# Patient Record
Sex: MALE | Race: WHITE | NOT HISPANIC OR LATINO | Employment: OTHER | URBAN - METROPOLITAN AREA
[De-identification: names, ages, dates, MRNs, and addresses within clinical notes are randomized per-mention and may not be internally consistent; named-entity substitution may affect disease eponyms.]

---

## 2018-02-09 ENCOUNTER — OFFICE VISIT (OUTPATIENT)
Dept: FAMILY MEDICINE | Facility: CLINIC | Age: 68
End: 2018-02-09
Payer: COMMERCIAL

## 2018-02-09 ENCOUNTER — TELEPHONE (OUTPATIENT)
Dept: FAMILY MEDICINE | Facility: CLINIC | Age: 68
End: 2018-02-09

## 2018-02-09 VITALS
BODY MASS INDEX: 24.86 KG/M2 | OXYGEN SATURATION: 97 % | SYSTOLIC BLOOD PRESSURE: 128 MMHG | WEIGHT: 164 LBS | HEIGHT: 68 IN | TEMPERATURE: 98.7 F | RESPIRATION RATE: 20 BRPM | HEART RATE: 83 BPM | DIASTOLIC BLOOD PRESSURE: 96 MMHG

## 2018-02-09 DIAGNOSIS — I10 HYPERTENSION GOAL BP (BLOOD PRESSURE) < 140/90: ICD-10-CM

## 2018-02-09 DIAGNOSIS — J20.9 ACUTE BRONCHITIS, UNSPECIFIED ORGANISM: Primary | ICD-10-CM

## 2018-02-09 DIAGNOSIS — F40.298 NEEDLE PHOBIA: ICD-10-CM

## 2018-02-09 DIAGNOSIS — R05.9 COUGH: ICD-10-CM

## 2018-02-09 LAB
FLUAV+FLUBV AG SPEC QL: NEGATIVE
FLUAV+FLUBV AG SPEC QL: NEGATIVE
SPECIMEN SOURCE: NORMAL

## 2018-02-09 PROCEDURE — 99214 OFFICE O/P EST MOD 30 MIN: CPT | Performed by: PHYSICIAN ASSISTANT

## 2018-02-09 PROCEDURE — 87804 INFLUENZA ASSAY W/OPTIC: CPT | Performed by: PHYSICIAN ASSISTANT

## 2018-02-09 RX ORDER — CEFDINIR 300 MG/1
300 CAPSULE ORAL 2 TIMES DAILY
Qty: 20 CAPSULE | Refills: 0 | Status: SHIPPED | OUTPATIENT
Start: 2018-02-09 | End: 2018-02-19

## 2018-02-09 RX ORDER — PREDNISONE 20 MG/1
20 TABLET ORAL 2 TIMES DAILY
Qty: 10 TABLET | Refills: 0 | Status: SHIPPED | OUTPATIENT
Start: 2018-02-09 | End: 2018-02-28

## 2018-02-09 RX ORDER — LIDOCAINE/PRILOCAINE 2.5 %-2.5%
CREAM (GRAM) TOPICAL PRN
Qty: 5 G | Refills: 3 | Status: SHIPPED | OUTPATIENT
Start: 2018-02-09 | End: 2021-05-04

## 2018-02-09 ASSESSMENT — PAIN SCALES - GENERAL: PAINLEVEL: NO PAIN (0)

## 2018-02-09 NOTE — PATIENT INSTRUCTIONS
Apply EMLA cream to affected area 15-30 minutes before needle stick.    Take omnicef twice a day for 10 days  Take prednisone twice a day for 5 days  Please schedule physical with fasting labs (nothing to eat or drink except water and/or medications 10 hours prior to appointment) and follow up  for treatment of hypertension or consider hypertension study for blood pressure management.

## 2018-02-09 NOTE — NURSING NOTE
"Chief Complaint   Patient presents with     Cough       Initial BP (!) 134/94 (BP Location: Right arm, Patient Position: Chair, Cuff Size: Adult Regular)  Pulse 83  Temp 98.7  F (37.1  C) (Oral)  Resp 20  Ht 1.715 m (5' 7.52\")  Wt 74.4 kg (164 lb)  SpO2 97%  BMI 25.29 kg/m2 Estimated body mass index is 25.29 kg/(m^2) as calculated from the following:    Height as of this encounter: 1.715 m (5' 7.52\").    Weight as of this encounter: 74.4 kg (164 lb).  Medication Reconciliation: complete     Crystal Edmonds      "

## 2018-02-09 NOTE — TELEPHONE ENCOUNTER
Obtain PA or change medication.    To obtain a PA for lidocaine-prilocaine (EMLA) cream:  1. Go to key.covermymeds.com and click Enter a Key  2. Enter the following       Key: [id number]       Last Name: Yaquelin       : 1950  3. Finish the form by completing required fields and click Send to Plan.      Member: KALPANA JAIME [[id number]]     Plan: BCBS PLATINUM BLUE [[id number]] Payor: LUANA [3]      Coverage Information    Coverage information:     Subscriber: [id number] KALPANA JAIME     Rel to sub: 01 - Self     Member ID: [id number]     Payor: 3-LUANA Ph: [phone]     Benefit plan: -BCBS PLATINUM BLUE     Group number: [id number]

## 2018-02-09 NOTE — MR AVS SNAPSHOT
"              After Visit Summary   2/9/2018    Josr Jamison    MRN: [id number]           Patient Information     Date Of Birth          1950        Visit Information        Provider Department      2/9/2018 1:20 PM Sandie Velarde PA-C [facility]        Today's Diagnoses     Cough    -  1    Needle phobia        Acute bronchitis, unspecified organism          Care Instructions    Apply EMLA cream to affected area 15-30 minutes before needle stick.    Take omnicef twice a day for 10 days  Take prednisone twice a day for 5 days  Please schedule physical with fasting labs (nothing to eat or drink except water and/or medications 10 hours prior to appointment) and follow up  for treatment of hypertension or consider hypertension study for blood pressure management.                     Follow-ups after your visit        Who to contact     If you have questions or need follow up information about today's clinic visit or your schedule please contact [facility] directly at [phone].  Normal or non-critical lab and imaging results will be communicated to you by MyChart, letter or phone within 4 business days after the clinic has received the results. If you do not hear from us within 7 days, please contact the clinic through [employer] Camp[employer]art or phone. If you have a critical or abnormal lab result, we will notify you by phone as soon as possible.  Submit refill requests through CaseStack or call your pharmacy and they will forward the refill request to us. Please allow 3 business days for your refill to be completed.          Additional Information About Your Visit        MyChart Information     CaseStack lets you send messages to your doctor, view your test results, renew your prescriptions, schedule appointments and more. To sign up, go to [email or web address] . Click on \"Log in\" on the left side of the screen, which will take you to the Welcome page. Then click on \"Sign up Now\" on " "the right side of the page.     You will be asked to enter the access code listed below, as well as some personal information. Please follow the directions to create your username and password.     Your access code is: [email or web address]  Expires: 5/10/2018  2:04 PM     Your access code will  in 90 days. If you need help or a new code, please call your [place] clinic or [phone].        Care EveryWhere ID     This is your Care EveryWhere ID. This could be used by other organizations to access your [place] medical records  [id number]        Your Vitals Were     Pulse Temperature Respirations Height Pulse Oximetry BMI (Body Mass Index)    83 98.7  F (37.1  C) (Oral) 20 1.715 m (5' 7.52\") 97% 25.29 kg/m2       Blood Pressure from Last 3 Encounters:   18 (!) 128/96   16 (!) 155/95   16 144/90    Weight from Last 3 Encounters:   18 74.4 kg (164 lb)   16 80 kg (176 lb 4.8 oz)   16 80 kg (176 lb 4.8 oz)              We Performed the Following     Influenza A/B antigen          Today's Medication Changes          These changes are accurate as of 18  2:04 PM.  If you have any questions, ask your nurse or doctor.               Start taking these medicines.        Dose/Directions    cefdinir 300 MG capsule   Commonly known as:  OMNICEF   Used for:  Acute bronchitis, unspecified organism   Started by:  Sandie Velarde PA-C        Dose:  300 mg   Take 1 capsule (300 mg) by mouth 2 times daily for 10 days   Quantity:  20 capsule   Refills:  0       lidocaine-prilocaine cream   Commonly known as:  EMLA   Used for:  Needle phobia   Started by:  Sandie Velarde PA-C        Apply topically as needed for moderate pain   Quantity:  5 g   Refills:  3       predniSONE 20 MG tablet   Commonly known as:  DELTASONE   Used for:  Acute bronchitis, unspecified organism   Started by:  Sandie Velarde PA-C        Dose:  20 mg   Take 1 tablet (20 mg) by mouth 2 times daily "   Quantity:  10 tablet   Refills:  0            Where to get your medicines      These medications [id number] - [place], [place] - [address]  [address] [place] [address] [address]     Phone:  [phone]     cefdinir 300 MG capsule    lidocaine-prilocaine cream    predniSONE 20 MG tablet                Primary Care Provider Office Phone # Fax #    Elizabeth Kebede [phone] [address]  [facility] [address]        Equal Access to Services     [facility]: Hadii aad ku hadasho Soomaali, waaxda luqadaha, qaybta kaalmada adeegyada, waxjason pabon . So [facility] [phone].    ATENCIÓN: Si habla español, tiene a sewell disposición servicios gratuitos de asistencia lingüística. [facility] [phone].    We comply with applicable federal civil rights laws and [place] laws. We do not discriminate on the basis of race, color, national origin, age, disability, sex, sexual orientation, or gender identity.            Thank you!     Thank you for choosing [facility]  for your care. Our goal is always to provide you with excellent care. Hearing back from our patients is one way we can continue to improve our services. Please take a few minutes to complete the written survey that you may receive in the mail after your visit with us. Thank you!             Your Updated Medication List - Protect others around you: Learn how to safely use, store and throw away your medicines at www.disposemymeds.org.          This list is accurate as of 2/9/18  2:04 PM.  Always use your most recent med list.                   Brand Name Dispense Instructions for use Diagnosis    cefdinir 300 MG capsule    OMNICEF    20 capsule    Take 1 capsule (300 mg) by mouth 2 times daily for 10 days    Acute bronchitis, unspecified organism       CONCERTA PO           IBUPROFEN PO           lidocaine-prilocaine cream    EMLA    5 g     Apply topically as needed for moderate pain    Needle phobia       MULTIVITAMIN & MINERAL PO      Take  by mouth.        predniSONE 20 MG tablet    DELTASONE    10 tablet    Take 1 tablet (20 mg) by mouth 2 times daily    Acute bronchitis, unspecified organism

## 2018-02-12 NOTE — TELEPHONE ENCOUNTER
Central Prior Authorization Team   Phone: [phone]    PA Initiation    Medication: lidocaine-prilocaine (EMLA) cream  Insurance Company: [facility] - Phone [phone] Fax [phone]  Pharmacy Filling the Rx: [id number] - [place], [place] - [address]  Filling Pharmacy Phone: [phone]  Filling Pharmacy Fax:    Start Date: 2/12/2018

## 2018-02-12 NOTE — TELEPHONE ENCOUNTER
Prior Authorization Approval    Authorization Effective Date: 1/1/2018  Authorization Expiration Date: 2/12/2019  Medication: lidocaine-prilocaine (EMLA) cream - APPROVED  Approved Dose/Quantity: 30 FOR 15   Reference #: [id number]   Insurance Company: [employer] [place] - Phone [phone] Fax [phone]  Expected CoPay: $4.56     CoPay Card Available:      Foundation Assistance Needed:    Which Pharmacy is filling the prescription (Not needed for infusion/clinic administered): [id number] - [place], [place] - [address]  Pharmacy Notified: Yes  Patient Notified: Yes

## 2018-02-28 ENCOUNTER — OFFICE VISIT (OUTPATIENT)
Dept: FAMILY MEDICINE | Facility: CLINIC | Age: 68
End: 2018-02-28
Payer: COMMERCIAL

## 2018-02-28 VITALS
WEIGHT: 167 LBS | HEART RATE: 66 BPM | OXYGEN SATURATION: 97 % | SYSTOLIC BLOOD PRESSURE: 138 MMHG | DIASTOLIC BLOOD PRESSURE: 86 MMHG | RESPIRATION RATE: 17 BRPM | BODY MASS INDEX: 25.31 KG/M2 | HEIGHT: 68 IN | TEMPERATURE: 98.2 F

## 2018-02-28 DIAGNOSIS — J06.9 VIRAL URI WITH COUGH: Primary | ICD-10-CM

## 2018-02-28 PROCEDURE — 99213 OFFICE O/P EST LOW 20 MIN: CPT | Performed by: PHYSICIAN ASSISTANT

## 2018-02-28 ASSESSMENT — PAIN SCALES - GENERAL: PAINLEVEL: NO PAIN (0)

## 2018-02-28 NOTE — NURSING NOTE
"Chief Complaint   Patient presents with     Cough       Initial /86 (BP Location: Right arm, Patient Position: Chair, Cuff Size: Adult Regular)  Pulse 66  Temp 98.2  F (36.8  C) (Oral)  Resp 17  Ht 1.715 m (5' 7.5\")  Wt 75.8 kg (167 lb)  SpO2 97%  BMI 25.77 kg/m2 Estimated body mass index is 25.77 kg/(m^2) as calculated from the following:    Height as of this encounter: 1.715 m (5' 7.5\").    Weight as of this encounter: 75.8 kg (167 lb).  Medication Reconciliation: complete     Mikki Varner MA       "

## 2018-02-28 NOTE — PATIENT INSTRUCTIONS
NON PRESCRIPTION TREATMENT    Mucinex 600 mg 2 tabs twice a day  Increase humidity to 30-40% in bedroom at night - vaporizer  Avoid decongestant  Saline nasal spray as needed  Increase fluid intake  Benadryl 25mg 1/2 - 1 hour before bed time  Maintain 8 hr minimum of sleep at night  Robitussin DM cough gels for cough     Call us if no improvement in symptoms by the end of the week.  Return urgently if any change in symptoms.        At [facility], we strive to deliver an exceptional experience to you, every time we see you.  If you receive a survey in the mail, please send us back your thoughts. We really do value your feedback.    Suggested websites for health information:  Www.[facility].org : Up to date and easily searchable information on multiple topics.  Www.HelpingDoc.gov : medication info, interactive tutorials, watch real surgeries online  Www.familydoctor.org : good info from the Academy of Family Physicians  Www.cdc.gov : public health info, travel advisories, epidemics (H1N1)  Www.aap.org : children's health info, normal development, vaccinations  Www.health.[facility].[place].us : [place] dept of health, public health issues in [place], N1N1    Your care team:     Family Medicine   MAKAYLA Frederick MD Emily Bunt, APRN CNP   S. MD Swapna Ramires MD Angela Wermerskirchen, MD         Clinic hours: Monday - Wednesday 7 am-7 pm   Thursdays and Fridays 7 am-5 pm.     [facility]: Monday - Friday 11 am-9 pm,   Saturday and Sunday 9 am-5 pm.    [facility]: Monday -Thursday 8 am-8 pm; Friday 8 am-6 pm; Saturday and Sunday 9 am-5 pm.     [facility]: Monday - Thursday 8 am - 7 pm; Friday 8 am - 6 pm    Clinic: [phone]   [facility]: [phone]   [facility]: [phone]

## 2018-02-28 NOTE — MR AVS SNAPSHOT
After Visit Summary   2/28/2018    Josr Jamison    MRN: [id number]           Patient Information     Date Of Birth          1950        Visit Information        Provider Department      2/28/2018 3:40 PM Sandie Velarde PA-C [facility]        Care Instructions    NON PRESCRIPTION TREATMENT    Mucinex 600 mg 2 tabs twice a day  Increase humidity to 30-40% in bedroom at night - vaporizer  Avoid decongestant  Saline nasal spray as needed  Increase fluid intake  Benadryl 25mg 1/2 - 1 hour before bed time  Maintain 8 hr minimum of sleep at night  Robitussin DM cough gels for cough     Call us if no improvement in symptoms by the end of the week.  Return urgently if any change in symptoms.        At [facility], we strive to deliver an exceptional experience to you, every time we see you.  If you receive a survey in the mail, please send us back your thoughts. We really do value your feedback.    Suggested websites for health information:  Www.[facility].[employer] : Up to date and easily searchable information on multiple topics.  Www.medlineplus.gov : medication info, interactive tutorials, watch real surgeries online  Www.familydoctor.org : good info from the Academy of Family Physicians  Www.cdc.gov : public health info, travel advisories, epidemics (H1N1)  Www.aap.org : children's health info, normal development, vaccinations  Www.health.state.[place].us : [place] dept of health, public health issues in [place], N1N1    Your care team:     Family Medicine   MAKAYLA Frederick MD Emily Bunt, APRN CNP   S. MD Swapna Ramires MD Angela Wermerskirchen, MD         Clinic hours: Monday - Wednesday 7 am-7 pm   Thursdays and Fridays 7 am-5 pm.     [facility]: Monday - Friday 11 am-9 pm,   Saturday and Sunday 9 am-5 pm.    [facility]: Monday -Thursday 8 am-8 pm; Friday 8 am-6 pm; Saturday and Sunday 9 am-5 pm.  "    [facility]: Monday - Thursday 8 am - 7 pm; Friday 8 am - 6 pm    Clinic: [phone]   [facility]: [phone]   [facility]: [phone]                  Follow-ups after your visit        Your next 10 appointments already scheduled     Feb 28, 2018  3:40 PM CST   [facility]    [address] [address] [phone]           Bring a current list of meds and any records pertaining to this visit. For Physicals, please bring immunization records and any forms needing to be filled out. Please arrive 10 minutes early to complete paperwork.              Who to contact     If you have questions or need follow up information about today's clinic visit or your schedule please contact [facility] directly at [phone].  Normal or non-critical lab and imaging results will be communicated to you by InSite Medical technologieshart, letter or phone within 4 business days after the clinic has received the results. If you do not hear from us within 7 days, please contact the clinic through [employer] or phone. If you have a critical or abnormal lab result, we will notify you by phone as soon as possible.  Submit refill requests through MyPrepApp or call your pharmacy and they will forward the refill request to us. Please allow 3 business days for your refill to be completed.          Additional Information About Your Visit        MyPrepApp Information     MyPrepApp lets you send messages to your doctor, view your test results, renew your prescriptions, schedule appointments and more. To sign up, go to [email or web address] . Click on \"Log in\" on the left side of the screen, which will take you to the Welcome page. Then click on \"Sign up Now\" on the right side of the page.     You will be asked to enter the access code listed below, as well as some personal " "information. Please follow the directions to create your username and password.     Your access code is: [email or web address]  Expires: 5/10/2018  2:04 PM     Your access code will  in 90 days. If you need help or a new code, please call your [place] clinic or [phone].        Care EveryWhere ID     This is your Care EveryWhere ID. This could be used by other organizations to access your [place] medical records  [id number]        Your Vitals Were     Pulse Temperature Respirations Height Pulse Oximetry BMI (Body Mass Index)    66 98.2  F (36.8  C) (Oral) 17 1.715 m (5' 7.5\") 97% 25.77 kg/m2       Blood Pressure from Last 3 Encounters:   18 138/86   18 (!) 128/96   16 (!) 155/95    Weight from Last 3 Encounters:   18 75.8 kg (167 lb)   18 74.4 kg (164 lb)   16 80 kg (176 lb 4.8 oz)              Today, you had the following     No orders found for display       Primary Care Provider Office Phone # Fax #    Elizabeth Kebede [phone] [address]  [facility] [facility] [address]        Equal Access to Services     : Hadii aad ku hadasho Soomaali, waaxda luqadaha, qaybta kaalmada adeegyada, day pabon . So [facility] [phone].    ATENCIÓN: Si habla español, tiene a sewell disposición servicios gratuitos de asistencia lingüística. Llame al [phone].    We comply with applicable federal civil rights laws and [place] laws. We do not discriminate on the basis of race, color, national origin, age, disability, sex, sexual orientation, or gender identity.            Thank you!     Thank you for choosing [facility]  for your care. Our goal is always to provide you with excellent care. Hearing back from our patients is one way we can continue to improve our services. Please take a few minutes to complete the written survey that you may receive in the mail after your visit with us. Thank you!           "   Your Updated Medication List - Protect others around you: Learn how to safely use, store and throw away your medicines at www.disposemymeds.org.          This list is accurate as of 2/28/18  3:31 PM.  Always use your most recent med list.                   Brand Name Dispense Instructions for use Diagnosis    CONCERTA PO           IBUPROFEN PO           lidocaine-prilocaine cream    EMLA    5 g    Apply topically as needed for moderate pain    Needle phobia       MULTIVITAMIN & MINERAL PO      Take  by mouth.

## 2018-02-28 NOTE — PROGRESS NOTES
SUBJECTIVE:   Josr Jamison is a 67 year old male who presents to clinic today for the following health issues:      Acute Illness   Acute illness concerns: cough  Onset: Sunday    Fever: YES    Chills/Sweats: YES    Headache (location?): no    Sinus Pressure:no    Conjunctivitis:  no    Ear Pain: no    Rhinorrhea: YES    Congestion: no     Sore Throat: no     Cough: YES-productive of yellow sputum, productive of green sputum    Wheeze: YES    Decreased Appetite: YES    Nausea: YES    Vomiting: no    Diarrhea:  YES    Dysuria/Freq.: no    Fatigue/Achiness: YES- fatigue    Sick/Strep Exposure: wife is also sick     Therapies Tried and outcome: recently treated for bronchitis      Treated for bronchitis and felt well for few days and then the last 3 days noted chills and not feeling well with productive cough with yellow sputum and sweats and chills.  Feels a bit better today then yesterday.  nyquil and dayquil help with symptoms.     Problem list and histories reviewed & adjusted, as indicated.  Additional history: as documented    Patient Active Problem List   Diagnosis     Advanced directives, counseling/discussion     ADD (attention deficit disorder)     Right inguinal hernia     CARDIOVASCULAR SCREENING; LDL GOAL LESS THAN 130     Hypertension goal BP (blood pressure) < 140/90     History of hepatitis     Glaucoma suspect     Cataract     Past Surgical History:   Procedure Laterality Date     CATARACT IOL, RT/LT      bilateral     COLONOSCOPY  4/25/2013     HERNIORRHAPHY INGUINAL Right 4/7/2016    Procedure: HERNIORRHAPHY INGUINAL;  Surgeon: Andriy Sheehan MD;  Location:  OR     LAPAROSCOPIC HERNIORRHAPHY INGUINAL  5/9/2013    Procedure: LAPAROSCOPIC HERNIORRHAPHY INGUINAL;  Laparoscopic inguinal hernia repair;  Surgeon: Andriy Sheehan MD;  Location:  OR     LASIK  2007     MOUTH SURGERY  In his 20's    [place] Teeth and tooth pulled     OPEN REDUCTION INTERNAL FIXATION CLAVICLE  6/20/2014     Procedure: OPEN REDUCTION INTERNAL FIXATION CLAVICLE;  Surgeon: Manjinder Landon MD;  Location: [facility] OR       Social History   Substance Use Topics     Smoking status: Former Smoker     Packs/day: 1.50     Years: 20.00     Quit date: 1995     Smokeless tobacco: Never Used     Alcohol use Yes      Comment: couple times per week - 2-3     Family History   Problem Relation Age of Onset     CANCER Mother      Brain   82 or 83     DIABETES Maternal Grandfather      C.A.D. Maternal Grandfather      Hypertension Maternal Grandfather      C.A.D. Brother 57     CABG, 3 vessel with valve replacement     Hypertension Brother      Allergies Brother      seasonal     Arthritis Sister      C.A.D. Father      cabg, stents.      DIABETES Other      Uncle - fathers side of family     Asthma No family hx of      CEREBROVASCULAR DISEASE No family hx of      Breast Cancer No family hx of      Cancer - colorectal No family hx of      Prostate Cancer No family hx of      Alcohol/Drug No family hx of      Alzheimer Disease No family hx of      Anesthesia Reaction No family hx of      Blood Disease No family hx of          Current Outpatient Prescriptions   Medication Sig Dispense Refill     lidocaine-prilocaine (EMLA) cream Apply topically as needed for moderate pain 5 g 3     Methylphenidate HCl (CONCERTA PO)        IBUPROFEN PO        Multiple Vitamins-Minerals (MULTIVITAMIN & MINERAL PO) Take  by mouth.         Reviewed and updated as needed this visit by clinical staff  Tobacco  Allergies  Meds  Problems  Med Hx  Surg Hx  Fam Hx  Soc Hx        Reviewed and updated as needed this visit by Provider  Tobacco  Allergies  Meds  Problems  Med Hx  Surg Hx  Fam Hx  Soc Hx          ROS:  Constitutional, HEENT, cardiovascular, pulmonary, gi and gu systems are negative, except as otherwise noted.    OBJECTIVE:     /86 (BP Location: Right arm, Patient Position: Chair, Cuff Size: Adult Regular)  Pulse 66  Temp 98.2  F  "(36.8  C) (Oral)  Resp 17  Ht 1.715 m (5' 7.5\")  Wt 75.8 kg (167 lb)  SpO2 97%  BMI 25.77 kg/m2  Body mass index is 25.77 kg/(m^2).  GENERAL: healthy, alert and no distress  EYES: Eyes grossly normal to inspection, PERRL and conjunctivae and sclerae normal  HENT: ear canals and TM's normal, nose and mouth without ulcers or lesions  NECK: no adenopathy, no asymmetry, masses, or scars and thyroid normal to palpation  RESP: lungs clear to auscultation - no rales, rhonchi or wheezes  CV: regular rate and rhythm, normal S1 S2, no S3 or S4, no murmur, click or rub, no peripheral edema and peripheral pulses strong  MS: no gross musculoskeletal defects noted, no edema    Diagnostic Test Results:  none     ASSESSMENT/PLAN:             1. Viral URI with cough  Recommended symptomatic cares.  Consider antibiotic if not improved by the end of the week.  Return urgently if any change in symptoms.      Patient Instructions     NON PRESCRIPTION TREATMENT    Mucinex 600 mg 2 tabs twice a day  Increase humidity to 30-40% in bedroom at night - vaporizer  Avoid decongestant  Saline nasal spray as needed  Increase fluid intake  Benadryl 25mg 1/2 - 1 hour before bed time  Maintain 8 hr minimum of sleep at night  Robitussin DM cough gels for cough     Call us if no improvement in symptoms by the end of the week.  Return urgently if any change in symptoms.        At [facility], we strive to deliver an exceptional experience to you, every time we see you.  If you receive a survey in the mail, please send us back your thoughts. We really do value your feedback.    Suggested websites for health information:  Www.[facility].org : Up to date and easily searchable information on multiple topics.  Www.medlineplus.gov : medication info, interactive tutorials, watch real surgeries online  Www.familydoctor.org : good info from the Academy of Family Physicians  Www.cdc.gov : public health info, travel advisories, epidemics " (H1N1)  Www.aap.org : children's health info, normal development, vaccinations  Www.health.[facility].[place].us : [place] dept of health, public health issues in [place], N1N1    Your care team:     Family Medicine   MAKAYLA Frederick MD Emily Bunt, CHITRA GAXIOLA   S. MD Swapna Ramires MD Angela Wermerskirchen, MD         Clinic hours: Monday - Wednesday 7 am-7 pm   Thursdays and Fridays 7 am-5 pm.     [facility]: Monday - Friday 11 am-9 pm,   Saturday and Sunday 9 am-5 pm.    [facility]: Monday -Thursday 8 am-8 pm; Friday 8 am-6 pm; Saturday and Sunday 9 am-5 pm.     [facility]: Monday - Thursday 8 am - 7 pm; Friday 8 am - 6 pm    Clinic: [phone]   [facility]: [phone]   [facility]: [phone]                     Sandie Velarde PA-C  [facility]

## 2018-03-13 DIAGNOSIS — Z53.9 ERRONEOUS ENCOUNTER--DISREGARD: Primary | ICD-10-CM

## 2019-05-23 ENCOUNTER — OFFICE VISIT (OUTPATIENT)
Dept: FAMILY MEDICINE | Facility: CLINIC | Age: 69
End: 2019-05-23
Payer: MEDICARE

## 2019-05-23 VITALS
BODY MASS INDEX: 24.55 KG/M2 | HEIGHT: 68 IN | SYSTOLIC BLOOD PRESSURE: 138 MMHG | TEMPERATURE: 98.7 F | RESPIRATION RATE: 19 BRPM | OXYGEN SATURATION: 97 % | HEART RATE: 78 BPM | WEIGHT: 162 LBS | DIASTOLIC BLOOD PRESSURE: 84 MMHG

## 2019-05-23 DIAGNOSIS — J20.9 ACUTE BRONCHITIS, UNSPECIFIED ORGANISM: Primary | ICD-10-CM

## 2019-05-23 PROCEDURE — 99213 OFFICE O/P EST LOW 20 MIN: CPT | Performed by: PHYSICIAN ASSISTANT

## 2019-05-23 RX ORDER — CEFDINIR 300 MG/1
300 CAPSULE ORAL 2 TIMES DAILY
Qty: 20 CAPSULE | Refills: 0 | Status: SHIPPED | OUTPATIENT
Start: 2019-05-23 | End: 2019-06-20

## 2019-05-23 ASSESSMENT — PAIN SCALES - GENERAL: PAINLEVEL: NO PAIN (0)

## 2019-05-23 ASSESSMENT — MIFFLIN-ST. JEOR: SCORE: 1475.36

## 2019-05-23 NOTE — PATIENT INSTRUCTIONS
Please schedule physical with fasting labs (nothing to eat or drink except water and/or medications 9 hours prior to appointment).       Take omnicef twice a day for 10 days follow up with us if no improvement over the next 3-5 days  Return urgently if any change in symptoms like increasing cough, fever, shortness of breath or other change in symptoms.

## 2019-05-23 NOTE — PROGRESS NOTES
Wesley Jamison is a 69 year old male who presents to clinic today for the following health issues:    HPI   Acute Illness   Acute illness concerns: cough  Onset: week a nd a half    Fever: YES    Chills/Sweats: YES    Headache (location?): no    Sinus Pressure:no    Conjunctivitis:  no    Ear Pain: no    Rhinorrhea: YES    Congestion: YES    Sore Throat: no     Cough: YES-productive of yellow sputum with blood    Wheeze: YES    Decreased Appetite: no    Nausea: no    Vomiting: no    Diarrhea:  no    Dysuria/Freq.: no    Fatigue/Achiness: YES    Sick/Strep Exposure: no     Therapies Tried and outcome: nyquil, otc cold medications, nasal spray    Cough productive Mostly phlegm with tinge of blood but a lot of yellow green.   Exhale to end wheezing   Fever last weekend.  Chills.  Not broke out sweats.   No fever last few days  No body aches.  Monday and Tuesday a little tired- no zip  Too tired to golf nine holes  No travel other than [place]  Travel to [place] couple months ago    Patient Active Problem List   Diagnosis     Advanced directives, counseling/discussion     ADD (attention deficit disorder)     Right inguinal hernia     CARDIOVASCULAR SCREENING; LDL GOAL LESS THAN 130     Hypertension goal BP (blood pressure) < 140/90     History of hepatitis     Glaucoma suspect     Cataract     Past Surgical History:   Procedure Laterality Date     CATARACT IOL, RT/LT      bilateral     COLONOSCOPY  4/25/2013     HERNIORRHAPHY INGUINAL Right 4/7/2016    Procedure: HERNIORRHAPHY INGUINAL;  Surgeon: Andriy Sheehan MD;  Location:  OR     LAPAROSCOPIC HERNIORRHAPHY INGUINAL  5/9/2013    Procedure: LAPAROSCOPIC HERNIORRHAPHY INGUINAL;  Laparoscopic inguinal hernia repair;  Surgeon: Andriy Sheehan MD;  Location:  OR     LASIK  2007     MOUTH SURGERY  In his 20's    [place] Teeth and tooth pulled     OPEN REDUCTION INTERNAL FIXATION CLAVICLE  6/20/2014    Procedure: OPEN REDUCTION INTERNAL  FIXATION CLAVICLE;  Surgeon: Manjinder Landon MD;  Location: [facility] OR       Social History     Tobacco Use     Smoking status: Former Smoker     Packs/day: 1.50     Years: 20.00     Pack years: 30.00     Last attempt to quit: 1995     Years since quittin.4     Smokeless tobacco: Never Used   Substance Use Topics     Alcohol use: Yes     Comment: couple times per week - 2-3     Family History   Problem Relation Age of Onset     Cancer Mother         Brain   82 or 83     Diabetes Maternal Grandfather      C.A.D. Maternal Grandfather      Hypertension Maternal Grandfather      C.A.D. Brother 57        CABG, 3 vessel with valve replacement     Hypertension Brother      Allergies Brother         seasonal     Arthritis Sister      C.A.D. Father         cabg, stents.      Diabetes Other         Uncle - fathers side of family     Asthma No family hx of      Cerebrovascular Disease No family hx of      Breast Cancer No family hx of      Cancer - colorectal No family hx of      Prostate Cancer No family hx of      Alcohol/Drug No family hx of      Alzheimer Disease No family hx of      Anesthesia Reaction No family hx of      Blood Disease No family hx of          Current Outpatient Medications   Medication Sig Dispense Refill     cefdinir (OMNICEF) 300 MG capsule Take 1 capsule (300 mg) by mouth 2 times daily for 10 days 20 capsule 0     IBUPROFEN PO        lidocaine-prilocaine (EMLA) cream Apply topically as needed for moderate pain 5 g 3     Methylphenidate HCl (CONCERTA PO)        Multiple Vitamins-Minerals (MULTIVITAMIN & MINERAL PO) Take  by mouth.       BP Readings from Last 3 Encounters:   19 138/84   18 138/86   18 (!) 128/96    Wt Readings from Last 3 Encounters:   19 73.5 kg (162 lb)   18 75.8 kg (167 lb)   18 74.4 kg (164 lb)                      Reviewed and updated as needed this visit by Provider  Tobacco  Allergies  Meds  Problems  Med Hx  Surg Hx  Fam Hx   "Soc Hx          Review of Systems   ROS COMP: Constitutional, HEENT, cardiovascular, pulmonary, gi and gu systems are negative, except as otherwise noted.      Objective    /84   Pulse 78   Temp 98.7  F (37.1  C) (Oral)   Resp 19   Ht 1.721 m (5' 7.75\")   Wt 73.5 kg (162 lb)   SpO2 97%   BMI 24.81 kg/m    Body mass index is 24.81 kg/m .  Physical Exam   GENERAL: healthy, alert and no distress  EYES: Eyes grossly normal to inspection, PERRL and conjunctivae and sclerae normal  HENT: ear canals and TM's normal, nose and mouth without ulcers or lesions  NECK: no adenopathy, no asymmetry, masses, or scars and thyroid normal to palpation  RESP: lungs clear to auscultation - no rales, rhonchi or wheezes  CV: regular rate and rhythm, normal S1 S2, no S3 or S4, no murmur, click or rub, no peripheral edema and peripheral pulses strong  PSYCH: mentation appears normal, affect normal/bright    Diagnostic Test Results:  none         Assessment & Plan     1. Acute bronchitis, unspecified organism  omnicef has worked well in past.  Lungs are clear to auscultation.    Consider chest xray if symptoms don't improve   - cefdinir (OMNICEF) 300 MG capsule; Take 1 capsule (300 mg) by mouth 2 times daily for 10 days  Dispense: 20 capsule; Refill: 0       Patient Instructions   Please schedule physical with fasting labs (nothing to eat or drink except water and/or medications 9 hours prior to appointment).       Take omnicef twice a day for 10 days follow up with us if no improvement over the next 3-5 days  Return urgently if any change in symptoms like increasing cough, fever, shortness of breath or other change in symptoms.       Return in about 2 weeks (around 6/6/2019), or if symptoms worsen or fail to improve.    Sandie Velarde PA-C  [facility]      "

## 2019-06-20 ENCOUNTER — OFFICE VISIT (OUTPATIENT)
Dept: FAMILY MEDICINE | Facility: CLINIC | Age: 69
End: 2019-06-20
Payer: MEDICARE

## 2019-06-20 VITALS
HEART RATE: 80 BPM | SYSTOLIC BLOOD PRESSURE: 122 MMHG | HEIGHT: 68 IN | BODY MASS INDEX: 24.55 KG/M2 | OXYGEN SATURATION: 98 % | RESPIRATION RATE: 19 BRPM | WEIGHT: 162 LBS | DIASTOLIC BLOOD PRESSURE: 80 MMHG | TEMPERATURE: 98.4 F

## 2019-06-20 DIAGNOSIS — J20.9 ACUTE BRONCHITIS, UNSPECIFIED ORGANISM: Primary | ICD-10-CM

## 2019-06-20 PROCEDURE — 99213 OFFICE O/P EST LOW 20 MIN: CPT | Performed by: PHYSICIAN ASSISTANT

## 2019-06-20 RX ORDER — AZITHROMYCIN 250 MG/1
TABLET, FILM COATED ORAL
Qty: 6 TABLET | Refills: 0 | Status: SHIPPED | OUTPATIENT
Start: 2019-06-20 | End: 2021-02-26

## 2019-06-20 RX ORDER — METHYLPHENIDATE HYDROCHLORIDE EXTENDED RELEASE 20 MG/1
TABLET ORAL
COMMUNITY
Start: 2019-06-11 | End: 2021-02-26

## 2019-06-20 ASSESSMENT — MIFFLIN-ST. JEOR: SCORE: 1475.36

## 2019-06-20 ASSESSMENT — PAIN SCALES - GENERAL: PAINLEVEL: NO PAIN (0)

## 2019-06-20 NOTE — PROGRESS NOTES
Wesley Jamison is a 69 year old male who presents to clinic today for the following health issues:    HPI   Acute Illness   Acute illness concerns: cough  Onset: Monday    Fever: YES    Chills/Sweats: YES    Headache (location?): no    Sinus Pressure:no    Conjunctivitis:  no    Ear Pain: no    Rhinorrhea: YES    Congestion: YES    Sore Throat: no     Cough: YES-productive of yellow sputum, productive of green sputum    Wheeze: YES    Decreased Appetite: YES    Nausea: YES    Vomiting: YES    Diarrhea:  YES    Dysuria/Freq.: no    Fatigue/Achiness: YES    Sick/Strep Exposure: YES- wife was sick from traveling     Therapies Tried and outcome: nyquil, dayquil  Symptoms for 4 days.  Fever on Monday night and tues.   No shortness of breath.  If exhale coughs  Monday golfed and tues and wed didn't due to symptoms   Cough is productive with yellow/green sputum.           Patient Active Problem List   Diagnosis     Advanced directives, counseling/discussion     ADD (attention deficit disorder)     Right inguinal hernia     CARDIOVASCULAR SCREENING; LDL GOAL LESS THAN 130     Hypertension goal BP (blood pressure) < 140/90     History of hepatitis     Glaucoma suspect     Cataract     Past Surgical History:   Procedure Laterality Date     CATARACT IOL, RT/LT      bilateral     COLONOSCOPY  4/25/2013     HERNIORRHAPHY INGUINAL Right 4/7/2016    Procedure: HERNIORRHAPHY INGUINAL;  Surgeon: Andriy Sheehan MD;  Location:  OR     LAPAROSCOPIC HERNIORRHAPHY INGUINAL  5/9/2013    Procedure: LAPAROSCOPIC HERNIORRHAPHY INGUINAL;  Laparoscopic inguinal hernia repair;  Surgeon: Andriy Sheehan MD;  Location:  OR     LASIK  2007     MOUTH SURGERY  In his 20's    [place] Teeth and tooth pulled     OPEN REDUCTION INTERNAL FIXATION CLAVICLE  6/20/2014    Procedure: OPEN REDUCTION INTERNAL FIXATION CLAVICLE;  Surgeon: Manjinder Landon MD;  Location:  OR       Social History     Tobacco Use     Smoking  status: Former Smoker     Packs/day: 1.50     Years: 20.00     Pack years: 30.00     Last attempt to quit: 1995     Years since quittin.4     Smokeless tobacco: Never Used   Substance Use Topics     Alcohol use: Yes     Comment: couple times per week - 2-3     Family History   Problem Relation Age of Onset     Cancer Mother         Brain   82 or 83     Diabetes Maternal Grandfather      TAINAAMERVAT. Maternal Grandfather      Hypertension Maternal Grandfather      MOHAMUD. Brother 57        CABG, 3 vessel with valve replacement     Hypertension Brother      Allergies Brother         seasonal     Arthritis Sister      C.A.D. Father         cabg, stents.      Diabetes Other         Uncle - fathers side of family     Asthma No family hx of      Cerebrovascular Disease No family hx of      Breast Cancer No family hx of      Cancer - colorectal No family hx of      Prostate Cancer No family hx of      Alcohol/Drug No family hx of      Alzheimer Disease No family hx of      Anesthesia Reaction No family hx of      Blood Disease No family hx of          Current Outpatient Medications   Medication Sig Dispense Refill            IBUPROFEN PO        lidocaine-prilocaine (EMLA) cream Apply topically as needed for moderate pain 5 g 3     methylphenidate (METADATE ER) 20 MG CR tablet        Multiple Vitamins-Minerals (MULTIVITAMIN & MINERAL PO) Take  by mouth.       BP Readings from Last 3 Encounters:   19 122/80   19 138/84   18 138/86    Wt Readings from Last 3 Encounters:   19 73.5 kg (162 lb)   19 73.5 kg (162 lb)   18 75.8 kg (167 lb)                      Reviewed and updated as needed this visit by Provider  Tobacco  Allergies  Meds  Problems  Med Hx  Surg Hx  Fam Hx  Soc Hx          Review of Systems   ROS COMP: Constitutional, HEENT, cardiovascular, pulmonary, gi and gu systems are negative, except as otherwise noted.      Objective    /80 (BP Location: Right arm,  "Patient Position: Chair, Cuff Size: Adult Large)   Pulse 80   Temp 98.4  F (36.9  C) (Oral)   Resp 19   Ht 1.721 m (5' 7.75\")   Wt 73.5 kg (162 lb)   SpO2 98%   BMI 24.81 kg/m    Body mass index is 24.81 kg/m .  Physical Exam   GENERAL: healthy, alert and no distress  EYES: Eyes grossly normal to inspection, PERRL and conjunctivae and sclerae normal  HENT: ear canals and TM's normal, nose and mouth without ulcers or lesions  NECK: no adenopathy, no asymmetry, masses, or scars and thyroid normal to palpation  RESP: lungs clear to auscultation - no rales, rhonchi or wheezes  CV: regular rate and rhythm, normal S1 S2, no S3 or S4, no murmur, click or rub, no peripheral edema and peripheral pulses strong  MS: no gross musculoskeletal defects noted, no edema    Diagnostic Test Results:  none         Assessment & Plan     1. Acute bronchitis, unspecified organism  Will treat with zithromax . Warning signs and symptoms warranting urgent evaluation reviewed.   - azithromycin (ZITHROMAX) 250 MG tablet; Two tablets first day, then one tablet daily for four days.  Dispense: 6 tablet; Refill: 0       Patient Instructions   Take zithromax daily for 5 days  Return urgently if any change in symptoms like increasing cough, shortness of breath, fever, chest pain or other change in symptoms       Return in about 4 weeks (around 7/18/2019), or if symptoms worsen or fail to improve, for Physical Exam.    Sandie Velarde PA-C  [facility]      "

## 2019-06-20 NOTE — PATIENT INSTRUCTIONS
Take zithromax daily for 5 days  Return urgently if any change in symptoms like increasing cough, shortness of breath, fever, chest pain or other change in symptoms

## 2019-06-24 ENCOUNTER — TELEPHONE (OUTPATIENT)
Dept: FAMILY MEDICINE | Facility: CLINIC | Age: 69
End: 2019-06-24

## 2019-06-24 DIAGNOSIS — J20.9 ACUTE BRONCHITIS, UNSPECIFIED ORGANISM: Primary | ICD-10-CM

## 2019-06-24 NOTE — TELEPHONE ENCOUNTER
This writer attempted to contact patient on 06/24/19      Reason for call triage and left message.      If patient calls back:   Registered Nurse called. Follow Triage Call workflow        Christina Alvarado RN

## 2019-06-24 NOTE — TELEPHONE ENCOUNTER
Reason for Call:  Other Symptoms    Detailed comments: Pt returning phone call and would like a phone call back regarding triage symptoms and request for prednisone.    Phone Number Patient can be reached at: Home number on file [phone] (home)    Best Time: anytime    Can we leave a detailed message on this number? YES    Call taken on 6/24/2019 at 2:11 PM by Andriy Renae

## 2019-06-24 NOTE — TELEPHONE ENCOUNTER
This writer attempted to contact Sean on 06/24/19      Reason for call triage symptoms and request for prednisone. Was seen in office on Thursday for bronchitis. Has worsened?  and left message.      If patient calls back:   Registered Nurse called. Follow Triage Call workflow        Marilee Ross RN

## 2019-06-24 NOTE — TELEPHONE ENCOUNTER
Patient started Z-pack right away and is feeling somewhat better, coughing a lot still, has fatigue.     Patient is requesting prednisone to decrease inflammation to start feeling better sooner as [place]    Please review and advise when able.  Please update the patient.    Christina Alvarado RN

## 2019-06-24 NOTE — TELEPHONE ENCOUNTER
patient returned call    Best number to reach caller: Cell number on file:    Telephone Information:   Mobile [phone]       Is it ok to leave a detailed message: YES

## 2019-06-25 RX ORDER — PREDNISONE 20 MG/1
40 TABLET ORAL DAILY
Qty: 10 TABLET | Refills: 0 | Status: SHIPPED | OUTPATIENT
Start: 2019-06-25 | End: 2019-07-01

## 2019-06-25 NOTE — TELEPHONE ENCOUNTER
Updated patient with message and he had no further questions or concerns at this time.       Mary Jo Morales RN, BSN, PHN

## 2020-12-22 ENCOUNTER — VIRTUAL VISIT (OUTPATIENT)
Dept: FAMILY MEDICINE | Facility: CLINIC | Age: 70
End: 2020-12-22
Payer: MEDICARE

## 2020-12-22 DIAGNOSIS — R06.02 SOB (SHORTNESS OF BREATH): Primary | ICD-10-CM

## 2020-12-22 PROCEDURE — 99442 PR PHYSICIAN TELEPHONE EVALUATION 11-20 MIN: CPT | Mod: 95 | Performed by: PHYSICIAN ASSISTANT

## 2020-12-22 NOTE — PROGRESS NOTES
"Josr Jamison is a 70 year old male who is being evaluated via a billable telephone visit.      The patient has been notified of following:     \"This telephone visit will be conducted via a call between you and your physician/provider. We have found that certain health care needs can be provided without the need for a physical exam.  This service lets us provide the care you need with a short phone conversation.  If a prescription is necessary we can send it directly to your pharmacy.  If lab work is needed we can place an order for that and you can then stop by our lab to have the test done at a later time.    Telephone visits are billed at different rates depending on your insurance coverage. During this emergency period, for some insurers they may be billed the same as an in-person visit.  Please reach out to your insurance provider with any questions.    If during the course of the call the physician/provider feels a telephone visit is not appropriate, you will not be charged for this service.\"    Patient has given verbal consent for Telephone visit?  Yes    What phone number would you like to be contacted at?     How would you like to obtain your AVS? Mail a copy    Subjective     Josr Jamison is a 70 year old male who presents via phone visit today for the following health issues:    HPI     Acute Illness  Acute illness concerns: Bronchitis  Onset/Duration: 2 weeks. Patient's wife recently had knee surgery and had to recover so he's been with her and since noticed symptoms  Symptoms:  Fever: never took temperature but had sweats and chills.   Saturday 5 first time started getting chills.  For a good 5-6 days.  Somewhere along the line started getting really winded with very little effort- walking up stairs and got worse.  No chills or sweats for 5 days.  Lungs very winded - thought might have had covid but doesn't believe it because wife asymptomatic  Did go on Sunday for covid test but don't have results " yet.   As winded -feels a bit better than a couple days ago.   When winded sit down and recover faster.   Doesn't take much to get shortness of breath.  Pulse is high.  Oximeter 88%   Chills/Sweats: not for five days  Headache (location?): no  Sinus Pressure: no  Conjunctivitis:  no  Ear Pain: no  Rhinorrhea: no  Congestion: no  Sore Throat: no  Cough: no  Wheeze: YES  Decreased Appetite: didn't ask  Nausea: 2 cases of nausea but due to phlegm in throat  Vomiting: no  Diarrhea: no  Dysuria/Freq.: no  Dysuria or Hematuria: no  Fatigue/Achiness: YES and feels winded frequently but able to still take full breath  Occasional cough with a little phlegm.  4 times really coughing for couple minutes   Phlegm -white   No loss of taste or smell.    arms feels like cramping and herb and xiandra??? Sp   Sick/Strep Exposure: no  Therapies tried and outcome:            Review of Systems   Constitutional, HEENT, cardiovascular, pulmonary, gi and gu systems are negative, except as otherwise noted.       Objective          Vitals:  No vitals were obtained today due to virtual visit.    healthy, alert and no distress  PSYCH: Alert and oriented times 3; coherent speech, normal   rate and volume, able to articulate logical thoughts, able   to abstract reason, no tangential thoughts, no hallucinations   or delusions  His affect is normal and pleasant  RESP: No cough, no audible wheezing, able to talk in full sentences  Remainder of exam unable to be completed due to telephone visits            Assessment/Plan:    Assessment & Plan     SOB (shortness of breath)  Oximeter of 88%- needs emergency department evaluation - likely covid           Patient Instructions   Go to emergency department for further evaluation today.           Return today (on 12/22/2020) for in person.    Sandie Velarde PA-C  [facility]    Phone call duration:  14 minutes

## 2021-02-09 ENCOUNTER — TRANSFERRED RECORDS (OUTPATIENT)
Dept: HEALTH INFORMATION MANAGEMENT | Facility: CLINIC | Age: 71
End: 2021-02-09

## 2021-02-26 ENCOUNTER — TELEPHONE (OUTPATIENT)
Dept: CARDIOLOGY | Facility: CLINIC | Age: 71
End: 2021-02-26

## 2021-02-26 ENCOUNTER — VIRTUAL VISIT (OUTPATIENT)
Dept: FAMILY MEDICINE | Facility: CLINIC | Age: 71
End: 2021-02-26
Payer: MEDICARE

## 2021-02-26 DIAGNOSIS — R06.02 SOB (SHORTNESS OF BREATH): ICD-10-CM

## 2021-02-26 DIAGNOSIS — R93.1 ABNORMAL CT SCAN, HEART: Primary | ICD-10-CM

## 2021-02-26 PROCEDURE — 99442 PR PHYSICIAN TELEPHONE EVALUATION 11-20 MIN: CPT | Mod: 95 | Performed by: PHYSICIAN ASSISTANT

## 2021-02-26 RX ORDER — METHYLPHENIDATE HYDROCHLORIDE EXTENDED RELEASE 20 MG/1
20 TABLET ORAL 4 TIMES DAILY
COMMUNITY
Start: 2021-02-26

## 2021-02-26 NOTE — TELEPHONE ENCOUNTER
[facility] Call Center    Phone Message    May a detailed message be left on voicemail: no     Reason for Call: Other: Pt has new patient appt (Video visit) with Dr Reyes on 3.4.21.  He has CT's and information in everywhere care and pt wants to make sure doctor/staff can view these records before his consultation.      Action Taken: Message routed to:  Adult Clinics: Cardiology p [phone]    Travel Screening:

## 2021-02-26 NOTE — PROGRESS NOTES
Sean is a 70 year old who is being evaluated via a billable telephone visit.      What phone number would you like to be contacted at?   How would you like to obtain your AVS? Mail a copy    Assessment & Plan     Abnormal CT scan, heart  Referred to cardiology- extensive coronary artery calcifications- able to review CT resultsin care everywhere   - CARDIOLOGY EVAL ADULT REFERRAL    SOB (shortness of breath)  Followed by pulmonary and referred to cardiology   - CARDIOLOGY EVAL ADULT REFERRAL      [facility] reviewed  19 minutes spent on the date of the encounter doing chart review, history and exam, documentation and further activities as noted above       Patient Instructions   Follow up with cardiology as soon as possible.  Get copy of disc with CT images for cardiology to review.  Go to the emergency department if develop chest pain, swelling in extremities, worsening shortness of breath or other change in symptoms.       No follow-ups on file.    Sandie Velarde PA-C  [facility]    Wesley Estrada is a 70 year old who presents for the following health issues     HPI     Virtual visit requesting referral to cardiology   History of shortness of breath with hospitalization 2 months ago for pneumonia and hypoxia still currently dependent on oxygen and had CT (2/2/21) from pulmonologist and significant enlargement of heart and extensive artery calcifications.  No chest pain.  Shortness of breath improved since hospitalization in December but continues to need oxygen and doesn't take much effort to develop shortness of breath.  Had echocardiogram during hospitalization.  No stress echo.    No history of heart problems        Review of Systems   Constitutional, HEENT, cardiovascular, pulmonary, gi and gu systems are negative, except as otherwise noted.      Objective         Sometimes blood pressure normal - at  times a bit high.    Vitals:  No vitals were obtained today due to virtual visit.    Physical Exam   healthy, alert and no distress  PSYCH: Alert and oriented times 3; coherent speech, normal   rate and volume, able to articulate logical thoughts, able   to abstract reason, no tangential thoughts, no hallucinations   or delusions  His affect is normal and pleasant  RESP: No cough, no audible wheezing, able to talk in full sentences  Remainder of exam unable to be completed due to telephone visits                Phone call duration: 16 minutes

## 2021-02-26 NOTE — PATIENT INSTRUCTIONS
Follow up with cardiology as soon as possible.  Get copy of disc with CT images for cardiology to review.  Go to the emergency department if develop chest pain, swelling in extremities, worsening shortness of breath or other change in symptoms.

## 2021-03-01 NOTE — TELEPHONE ENCOUNTER
Faxed request for echo disc (to be mailed) to [phone]. Also requested any other heart related imaging to be pushed to [facility].     I called the patient and lm asking him to bring a echo disc if he had one. Otherwise we are good to go for his appt.     Nancy Solorzano CMA......March 1, 2021     2:52 PM

## 2021-03-03 ENCOUNTER — TELEPHONE (OUTPATIENT)
Dept: CARDIOLOGY | Facility: CLINIC | Age: 71
End: 2021-03-03

## 2021-03-03 NOTE — TELEPHONE ENCOUNTER
[facility] Call Center    Phone Message    May a detailed message be left on voicemail: yes     Reason for Call: Pt returned phone call to the care team.  No one was available to speak with him when he called.  Please try calling Pt back.  Thanks.    Action Taken: Message routed to:  Adult Clinics: Cardiology p [phone]    Travel Screening: Not Applicable

## 2021-03-03 NOTE — TELEPHONE ENCOUNTER
Left message for pt stating the I got his message that he didn't have the disc. This is fine I already requested a disc to mailed to the clinic for echo review.    Nancy Solorzano CMA......March 3, 2021     12:17 PM

## 2021-03-04 ENCOUNTER — VIRTUAL VISIT (OUTPATIENT)
Dept: CARDIOLOGY | Facility: CLINIC | Age: 71
End: 2021-03-04
Attending: PHYSICIAN ASSISTANT
Payer: MEDICARE

## 2021-03-04 DIAGNOSIS — I51.89 OTHER ILL-DEFINED HEART DISEASES: ICD-10-CM

## 2021-03-04 DIAGNOSIS — I51.7 CARDIOMEGALY: ICD-10-CM

## 2021-03-04 DIAGNOSIS — R06.02 SOB (SHORTNESS OF BREATH): ICD-10-CM

## 2021-03-04 DIAGNOSIS — I27.20 PULMONARY HYPERTENSION (H): Primary | ICD-10-CM

## 2021-03-04 DIAGNOSIS — R93.1 ABNORMAL CT SCAN, HEART: ICD-10-CM

## 2021-03-04 DIAGNOSIS — R93.1 ABNORMAL FINDINGS DIAGNOSTIC IMAGING OF HEART AND CORONARY CIRCULATION: ICD-10-CM

## 2021-03-04 DIAGNOSIS — I25.10 CORONARY ARTERY DISEASE INVOLVING NATIVE CORONARY ARTERY OF NATIVE HEART WITHOUT ANGINA PECTORIS: ICD-10-CM

## 2021-03-04 PROCEDURE — 99205 OFFICE O/P NEW HI 60 MIN: CPT | Mod: 95 | Performed by: INTERNAL MEDICINE

## 2021-03-04 RX ORDER — ALBUTEROL SULFATE 90 UG/1
2 AEROSOL, METERED RESPIRATORY (INHALATION)
COMMUNITY
Start: 2021-02-24 | End: 2021-05-04

## 2021-03-04 RX ORDER — ALBUTEROL SULFATE 0.83 MG/ML
2.5 SOLUTION RESPIRATORY (INHALATION)
COMMUNITY
Start: 2021-01-20 | End: 2021-05-04

## 2021-03-04 RX ORDER — METRONIDAZOLE 7.5 MG/G
1 GEL TOPICAL DAILY
COMMUNITY
Start: 2020-12-16

## 2021-03-04 RX ORDER — FLUOCINONIDE 0.5 MG/G
OINTMENT TOPICAL
COMMUNITY
Start: 2019-12-07 | End: 2022-01-01

## 2021-03-04 RX ORDER — BETAMETHASONE DIPROPIONATE 0.5 MG/G
1 CREAM TOPICAL
COMMUNITY
Start: 2020-02-07 | End: 2022-01-01

## 2021-03-04 RX ORDER — ASPIRIN 81 MG/1
81 TABLET, CHEWABLE ORAL DAILY
COMMUNITY
Start: 2021-03-04 | End: 2022-01-01

## 2021-03-04 RX ORDER — ROSUVASTATIN CALCIUM 10 MG/1
10 TABLET, COATED ORAL DAILY
Qty: 30 TABLET | Refills: 11 | Status: SHIPPED | OUTPATIENT
Start: 2021-03-04 | End: 2021-06-03

## 2021-03-04 NOTE — PATIENT INSTRUCTIONS
You were seen at the [facility] clinic today.  You saw Dr. Hasmukh Reyes  Here are your Instructions:    1. Cardiac MRI  2. Right heart catheterization  3. Start aspirin 81 mg daily (baby aspirin, over the couter)  4. Start rosuvastatin (Crestor) 10 mg daily   5. Follow up with Dr. Reyes in 3 months with fasting cholesterol testing prior.

## 2021-03-04 NOTE — PROGRESS NOTES
Sean is a 70 year old who is being evaluated via a billable video visit.      Video-Visit Details    Type of service:  Video Visit  Video Start Time: 2:40 PM  Video End Time:3:24 PM  Originating Location (pt. Location): Home  Distant Location (provider location):  [facility]   Platform used for Video Visit: More is a 70 year old who is being evaluated via a billable video visit.        Chief complaint: abnormal cardiac imaging    HPI:   Josr Jamison is a 70 year old male former smoker (1.5 ppd x 20y) with no prior cardiac history referred for multiple abnormal cardiovascular findings on imaging.     CAD risk factor profile is: +former tobacco (30 pk-y) -DM -HTN +HL (elevated LDL in 2014)     He was admitted to [facility] in December for PNA (suspected to have been non-COVID viral pneumonia) and hypoxia from which he was still on supplemental oxygen at the time of primary care visit 2/26/21. CTA chest 12/22/20 was notable for prominent central PAs suggestive of pulmonary hypertension. TTE (see below) showed normal LV function, grade 1 diastolic dysfunction, possible RV dysfunction, and mildly elevated PA pressure (RVSP 37 mmHg+RAP 3mmHg.) Subsequent non-contrast CT chest 2/9/21 again showed parenchymal lung findings (see below) and also reported coronary artery calcification and cardiomegaly. He is referred for evaluation of possible pulmonary hypertension, possible RV dysfunction, and coronary artery calcifications.     He is currently on 3L supplemental O2. He was previously active and able to golf as recently as 6 months ago and had no functional limitations as recently as 1 week prior to his admission in December, he was regularly exercising by walking up 6 flights of stairs without stopping without significant dyspnea. He has been a vegetarian since his 20s. He does feel that he has had some degree of improvement since his admission, but he remains limited and would  become dyspneic walking up even 1 flight of stairs with supplemental O2. He has lightheadedness when he is extremely dyspneic (last happened prior to his admission in December) but never at other times and never since discharge.    His family history is notable of CAD in his father (CABG in his 70s,  at 89) and brother (CABG at 59.) No known family history of premature CAD.     He denies any chest pain, PND, orthopnea, peripheral edema, palpitations, lightheadedness or syncope.     PAST MEDICAL HISTORY:  Past Medical History:   Diagnosis Date     ADD (attention deficit disorder)      ADD (attention deficit disorder)      CARDIOVASCULAR SCREENING; LDL GOAL LESS THAN 130 2013    [place] Risk Score: 12% (13 Total Points)      Cataract     bilateral     Clavicle fracture      Foot fracture, right      Horseback riding age 27    bucked off horse, torn urethra, catheter for a time     Hyperlipidemia LDL goal <130 2013    [place] Risk Score: 12% (13 Total Points)      Hypertension goal BP (blood pressure) < 140/90 2013    Attempting to control with diet and exercise       CURRENT MEDICATIONS:  Current Outpatient Medications   Medication Sig Dispense Refill     albuterol (PROAIR HFA/PROVENTIL HFA/VENTOLIN HFA) 108 (90 Base) MCG/ACT inhaler Inhale 2 puffs into the lungs       albuterol (PROVENTIL) (2.5 MG/3ML) 0.083% neb solution Inhale 2.5 mg into the lungs       augmented betamethasone dipropionate (DIPROLENE-AF) 0.05 % external cream Apply 1 Application topically       fluocinonide (LIDEX) 0.05 % external ointment Apply to the hands qhs, cover with thin layer of Vaseline and put on 100% cotton gloves for up to 1-2 wks       IBUPROFEN PO        lidocaine-prilocaine (EMLA) cream Apply topically as needed for moderate pain 5 g 3     methylphenidate (METADATE ER) 20 MG CR tablet        metroNIDAZOLE (METROGEL) 0.75 % external gel Apply 1 Application topically       Multiple Vitamins-Minerals  (MULTIVITAMIN & MINERAL PO) Take  by mouth.       umeclidinium-vilanterol (ANORO ELLIPTA) 62.5-25 MCG/INH oral inhaler          PAST SURGICAL HISTORY:  Past Surgical History:   Procedure Laterality Date     CATARACT IOL, RT/LT      bilateral     COLONOSCOPY  2013     HERNIORRHAPHY INGUINAL Right 2016    Procedure: HERNIORRHAPHY INGUINAL;  Surgeon: Andriy Sheehan MD;  Location: [facility] OR     LAPAROSCOPIC HERNIORRHAPHY INGUINAL  2013    Procedure: LAPAROSCOPIC HERNIORRHAPHY INGUINAL;  Laparoscopic inguinal hernia repair;  Surgeon: Andriy Sheehan MD;  Location: [facility] OR     LASIK  2007     MOUTH SURGERY  In his 20's    [place] and tooth pulled     OPEN REDUCTION INTERNAL FIXATION CLAVICLE  2014    Procedure: OPEN REDUCTION INTERNAL FIXATION CLAVICLE;  Surgeon: Manjinder Landon MD;  Location: [facility] OR       ALLERGIES:     Allergies   Allergen Reactions     Ether      Told as a child -        FAMILY HISTORY:  His family history is notable of CAD in his father (CABG in his 70s,  at 89) and brother (CABG at 59.) No known family history of premature CAD.     SOCIAL HISTORY:  Social History     Tobacco Use     Smoking status: Former Smoker     Packs/day: 1.50     Years: 20.00     Pack years: 30.00     Quit date: 1995     Years since quittin.1     Smokeless tobacco: Never Used   Substance Use Topics     Alcohol use: Yes     Comment: couple times per week - 2-3     Drug use: No       ROS:   A comprehensive 14 point review of systems is negative other than as mentioned in HPI.    Exam:  CONSITUTIONAL: no acute distress. Wearing nasal cannula.   HEENT: no icterus, sclerae white  CV: no visible edema of visualized upper extremities, no gross JVD  CHEST: respirations nonlabored, no audible wheezing  NEURO: AA&Ox3, speech fluent/appropriate, motor grossly nonfocal  PSYCH: cooperative, affect appropriate  DERM: no rashes on visualized face/neck/upper extremities    The rest of a comprehensive  physical examination is deferred due to PHE (public health emergency) video visit restrictions.        Labs:  Reviewed.       Testing/Procedures:      Outside results of note:  Outside records from [facility] were obtained, and relevant results/notes have been incorporated into HPI.    TTE 12/23/20 ([facility]):  The left ventricle is normal size. The left ventricular systolic function is normal, estimated LVEF 55-60%. Left ventricular wall motion is normal.  Quantitatively reduced right ventricular systolic function.  There is mild to moderate tricuspid regurgitation.  Borderline pulmonary hypertension, estimated right ventricular systolic pressure is 37 mmHg. RAP~3 mmHg.  There is no pericardial effusion.  Diastolic parameters: Grade 1 diastolic dysfunction. Left-sided filling pressures indeterminate (PAP~40 mmHg suggests elevation but E/e'<14.) E/A 0.87, Medial e' 9 cm/s, lateral e' 11.8 cm/s, E/e' medial 7.6, lateral 9.3. TR velocity 2.9 m/s, LADI 42 mL/m2.    CTA chest 12/22/21 ([facility]):  1.  No evidence of pulmonary embolism. Prominence of the central pulmonary arteries consistent with pulmonary arterial hypertension.  2.  Diffusely prominent interstitial pulmonary opacities scattered throughout both lungs with some superimposed groundglass pulmonary opacities bilaterally. The appearance is nonspecific. Differential considerations would include interstitial pulmonary edema, interstitial fibrosis, interstitial infiltrates or a combination of these three etiologies.  3.  Cardiomegaly.  4.  Minimal bilateral pleural effusions.  5.  Minimal right upper quadrant ascites.  6. No prior study for comparison.    CT chest w/o contrast 2/9/21 ([facility]):  1.  Redemonstration of subpleural intralobular interstitial thickening in the upper, mid, and lower lung zones bilaterally presumably representing fibrosis.  2.  Redemonstration of patchy groundglass opacities in the upper, mid, and lower lung  zones bilaterally, less conspicuous compared to the previous study.  3.  No new pulmonary parenchymal opacities are seen.  4.  Prominent right paratracheal and aorticopulmonary window lymph nodes again noted, decreased from the previous study. Previously noted subcarinal and AE recess lymph nodes are no longer seen.  5.  Extensive coronary artery calcifications indicating coronary artery disease.  6.  Cardiomegaly again noted.    Assessment and Plan:   1. Abnormal CT chest  2. Suspected pulmonary hypertension (suggested by TTE and dilated PA on CTA)  3. Possible RV dysfunction  4. Cardiomegaly on CT chest  5. Chronic hypoxemic respiratory failure with new 3L supplemental O2 requirement since 12/2020   -CMR to better assess RV function   -RHC to definitively assess for pulmonary hypertension    3. CAD without angina (coronary calcifications on CT):  Discussed that this represents coronary artery disease and merits aggressive preventative therapy. In the absence of anginal symptoms and with hypoxemia and dyspnea more likely related to pulmonary disease, there is no compelling indication for ischemic evaluation at this time. I discussed that if Sean develops any potential anginal symptoms (chest pain/pressure, dyspnea not explained by pulmonary disease) or cardiac MRI shows significant LV dysfunction, this would merit immediate evaluation for significant CAD and that I would have a low threshold for first-line invasive coronary angiography in this case given his relatively high pretest probability with known atheroslclerotic CAD.   -start ASA 81 mg daily   -start rosuvastatin 10 mg daily    Follow up in 3 months with fasting lipids prior    Chart review time: 30 minutes  Video time: 44 minutes  Total time spent: 74 minutes    The patient states understanding and is agreeable with plan.     Hasmukh Reyes MD  Cardiology    CC  RADHA MAKI

## 2021-03-04 NOTE — TELEPHONE ENCOUNTER
[facility] Call Center    Phone Message    May a detailed message be left on voicemail: yes     Reason for Call: Other: pt returning call please advise with him     Action Taken: Message routed to:  Adult Clinics: Cardiology p [id number]    Travel Screening: Not Applicable

## 2021-03-09 ENCOUNTER — TRANSFERRED RECORDS (OUTPATIENT)
Dept: HEALTH INFORMATION MANAGEMENT | Facility: CLINIC | Age: 71
End: 2021-03-09

## 2021-03-10 ENCOUNTER — OFFICE VISIT (OUTPATIENT)
Dept: FAMILY MEDICINE | Facility: CLINIC | Age: 71
End: 2021-03-10
Payer: MEDICARE

## 2021-03-10 VITALS
RESPIRATION RATE: 16 BRPM | OXYGEN SATURATION: 94 % | TEMPERATURE: 98.2 F | SYSTOLIC BLOOD PRESSURE: 140 MMHG | DIASTOLIC BLOOD PRESSURE: 80 MMHG | HEART RATE: 93 BPM | BODY MASS INDEX: 21.6 KG/M2 | WEIGHT: 141 LBS

## 2021-03-10 DIAGNOSIS — Z01.818 PREOP GENERAL PHYSICAL EXAM: Primary | ICD-10-CM

## 2021-03-10 DIAGNOSIS — J84.9 ILD (INTERSTITIAL LUNG DISEASE) (H): ICD-10-CM

## 2021-03-10 DIAGNOSIS — Z11.59 ENCOUNTER FOR SCREENING FOR OTHER VIRAL DISEASES: Primary | ICD-10-CM

## 2021-03-10 DIAGNOSIS — R09.02 HYPOXIA: ICD-10-CM

## 2021-03-10 PROCEDURE — 99214 OFFICE O/P EST MOD 30 MIN: CPT | Performed by: INTERNAL MEDICINE

## 2021-03-10 RX ORDER — SODIUM CHLORIDE 9 MG/ML
INJECTION, SOLUTION INTRAVENOUS CONTINUOUS
Status: CANCELLED | OUTPATIENT
Start: 2021-03-10

## 2021-03-10 RX ORDER — LIDOCAINE 40 MG/G
CREAM TOPICAL
Status: CANCELLED | OUTPATIENT
Start: 2021-03-10

## 2021-03-10 NOTE — PROGRESS NOTES
[address] [address]  Phone: [phone]  Primary Provider: Elizabeth Kebede  Pre-op Performing Provider: CATARINA LOPEZ      PREOPERATIVE EVALUATION:  Today's date: 3/10/2021    Josr Jamison is a 70 year old male who presents for a preoperative evaluation.    Surgical Information:  Surgery/Procedure: Robotic XI assisted right lung biopsy  Surgery Location: [facility]  Surgeon: Dr. Fermin Gregg  Surgery Date: 3/18/2021  Time of Surgery: 2:00 PM  Where patient plans to recover: at home with family  Fax number for surgical facility: [phone]    Type of Anesthesia Anticipated: to be determined    Assessment & Plan     The proposed surgical procedure is considered LOW risk.    Preop general physical exam  He was in general good physical health up until December 2020 when he started getting rapidly short of breath  Admitted to the hospital and CT scan showed groundglass opacities  Autoimmune work-up negative  Going to have biopsy of the lung  Cleared for the procedure  He is on oxygen 3 L all the time and his oxygen levels has to be closely watched postprocedure    Hypoxia  from groundglass opacities in the lungs  He is on 3 L of oxygen all the time    ILD (interstitial lung disease) (H)  As above  Diagnosis not clear  He is getting a lung biopsy  He is on methylphenidate that the pneumotox website states that this can cause some interstitial lung changes  I asked him to discuss this with his pulmonologist      Possible Sleep Apnea: Yes   No flowsheet data found.         Risks and Recommendations:  The patient has the following additional risks and recommendations for perioperative complications:  Pulmonary:    - Incentive spirometry post-op   - Consider Respiratory Therapy (Respiratory Care IP Consult) post-op   - Recently treated pulmonary infection   - Oxygen dependent lung disease    Medication  Instructions:  Patient is to take all scheduled medications on the day of surgery EXCEPT for modifications listed below: Aspirin    RECOMMENDATION:  APPROVAL GIVEN to proceed with proposed procedure, without further diagnostic evaluation.              30 minutes spent on the date of the encounter doing chart review, history and exam, documentation and further activities as noted above        Subjective     HPI related to upcoming procedure: On going SOB for the last few weeks CT-scan showed multiple ground glass opacities .Going to get Lung Biopsy      Preop Questions 3/10/2021   1. Have you ever had a heart attack or stroke? No   2. Have you ever had surgery on your heart or blood vessels, such as a stent placement, a coronary artery bypass, or surgery on an artery in your head, neck, heart, or legs? No   3. Do you have chest pain with activity? No   4. Do you have a history of  heart failure? No   5. Do you currently have a cold, bronchitis or symptoms of other infection? No   6. Do you have a cough, shortness of breath, or wheezing? YES    7. Do you or anyone in your family have previous history of blood clots? No   8. Do you or does anyone in your family have a serious bleeding problem such as prolonged bleeding following surgeries or cuts? No   9. Have you ever had problems with anemia or been told to take iron pills? No   10. Have you had any abnormal blood loss such as black, tarry or bloody stools? No   11. Have you ever had a blood transfusion? No   12. Are you willing to have a blood transfusion if it is medically needed before, during, or after your surgery? Yes   13. Have you or any of your relatives ever had problems with anesthesia? No   14. Do you have sleep apnea, excessive snoring or daytime drowsiness? YES - snoring   14a. Do you have a CPAP machine? No   15. Do you have any artifical heart valves or other implanted medical devices like a pacemaker, defibrillator, or continuous glucose monitor? No    16. Do you have artificial joints? No   17. Are you allergic to latex? No     Health Care Directive:  Patient does not have a Health Care Directive or Living Will: Discussed advance care planning with patient; however, patient declined at this time.  [id number]}    Status of Chronic Conditions:  See problem list for active medical problems.  Problems all longstanding and stable, except as noted/documented.  See ROS for pertinent symptoms related to these conditions.      Review of Systems  CONSTITUTIONAL: NEGATIVE for fever, chills, change in weight  INTEGUMENTARY/SKIN: NEGATIVE for worrisome rashes, moles or lesions  EYES: NEGATIVE for vision changes or irritation  ENT/MOUTH: NEGATIVE for ear, mouth and throat problems  RESP:SOB  CV: NEGATIVE for chest pain, palpitations or peripheral edema  GI: NEGATIVE for nausea, abdominal pain, heartburn, or change in bowel habits  : NEGATIVE for frequency, dysuria, or hematuria  MUSCULOSKELETAL:Muscle aches   NEURO: NEGATIVE for weakness, dizziness or paresthesias  ENDOCRINE: NEGATIVE for temperature intolerance, skin/hair changes  HEME: NEGATIVE for bleeding problems  PSYCHIATRIC: NEGATIVE for changes in mood or affect    Patient Active Problem List    Diagnosis Date Noted     Abnormal CT scan, heart 03/04/2021     Priority: Medium     Added automatically from request for surgery [id number]       Pulmonary hypertension (H) 03/04/2021     Priority: Medium     Added automatically from request for surgery [id number]       Abnormal findings diagnostic imaging of heart and coronary circulation 03/04/2021     Priority: Medium     Added automatically from request for surgery [id number]       Other ill-defined heart diseases 03/04/2021     Priority: Medium     Added automatically from request for surgery [id number]       Cataract      Priority: Medium     bilateral       Glaucoma suspect 01/21/2014     Priority: Medium     Borderline open angle findings [facility]: [facility]       History of  hepatitis 04/05/2013     Priority: Medium     Neg Heb S ag, and Hep C ruba       Hypertension goal BP (blood pressure) < 140/90 04/04/2013     Priority: Medium     Attempting to control with diet and exercise       CARDIOVASCULAR SCREENING; LDL GOAL LESS THAN 130 02/22/2013     Priority: Medium     [place] Risk Score: 12% (13 Total Points)         Advanced directives, counseling/discussion 02/07/2013     Priority: Medium     Patient states has Advance Directive and will bring in a copy to clinic. 2/7/2013          Right inguinal hernia 02/07/2013     Priority: Medium     ADD (attention deficit disorder)      Priority: Medium     Sees psychiatrist in [place]         Past Medical History:   Diagnosis Date     ADD (attention deficit disorder)      ADD (attention deficit disorder)      CARDIOVASCULAR SCREENING; LDL GOAL LESS THAN 130 2/22/2013    [place] Risk Score: 12% (13 Total Points)      Cataract     bilateral     Clavicle fracture      Foot fracture, right      Horseback riding age 27    bucked off horse, torn urethra, catheter for a time     Hyperlipidemia LDL goal <130 2/22/2013    [place] Risk Score: 12% (13 Total Points)      Hypertension goal BP (blood pressure) < 140/90 4/4/2013    Attempting to control with diet and exercise     Past Surgical History:   Procedure Laterality Date     CATARACT IOL, RT/LT      bilateral     COLONOSCOPY  4/25/2013     HERNIORRHAPHY INGUINAL Right 4/7/2016    Procedure: HERNIORRHAPHY INGUINAL;  Surgeon: Andriy Sheehan MD;  Location:  OR     LAPAROSCOPIC HERNIORRHAPHY INGUINAL  5/9/2013    Procedure: LAPAROSCOPIC HERNIORRHAPHY INGUINAL;  Laparoscopic inguinal hernia repair;  Surgeon: Andriy Sheehan MD;  Location:  OR     LASIK  2007     MOUTH SURGERY  In his 20's    [place] and tooth pulled     OPEN REDUCTION INTERNAL FIXATION CLAVICLE  6/20/2014    Procedure: OPEN REDUCTION INTERNAL FIXATION CLAVICLE;  Surgeon: Manjinder Landon MD;  Location:   OR     Current Outpatient Medications   Medication Sig Dispense Refill     albuterol (PROAIR HFA/PROVENTIL HFA/VENTOLIN HFA) 108 (90 Base) MCG/ACT inhaler Inhale 2 puffs into the lungs       albuterol (PROVENTIL) (2.5 MG/3ML) 0.083% neb solution Inhale 2.5 mg into the lungs       aspirin (ASA) 81 MG chewable tablet Take 1 tablet (81 mg) by mouth daily       augmented betamethasone dipropionate (DIPROLENE-AF) 0.05 % external cream Apply 1 Application topically       fluocinonide (LIDEX) 0.05 % external ointment Apply to the hands qhs, cover with thin layer of Vaseline and put on 100% cotton gloves for up to 1-2 wks       IBUPROFEN PO        lidocaine-prilocaine (EMLA) cream Apply topically as needed for moderate pain 5 g 3     methylphenidate (METADATE ER) 20 MG CR tablet        metroNIDAZOLE (METROGEL) 0.75 % external gel Apply 1 Application topically       Multiple Vitamins-Minerals (MULTIVITAMIN & MINERAL PO) Take  by mouth.       rosuvastatin (CRESTOR) 10 MG tablet Take 1 tablet (10 mg) by mouth daily 30 tablet 11     umeclidinium-vilanterol (ANORO ELLIPTA) 62.5-25 MCG/INH oral inhaler          Allergies   Allergen Reactions     Ether      Told as a child -         Social History     Tobacco Use     Smoking status: Former Smoker     Packs/day: 1.50     Years: 20.00     Pack years: 30.00     Quit date: 1995     Years since quittin.2     Smokeless tobacco: Never Used   Substance Use Topics     Alcohol use: Yes     Comment: couple times per week - 2-3       History   Drug Use No         Objective     BP (!) 140/80   Pulse 93   Temp 98.2  F (36.8  C) (Oral)   Resp 16   Wt 64 kg (141 lb)   SpO2 94%   BMI 21.60 kg/m      Physical Exam    GENERAL APPEARANCE: healthy, alert and no distress     EYES: EOMI,  PERRL     HENT: ear canals and TM's normal and nose and mouth without ulcers or lesions     NECK: no adenopathy, no asymmetry, masses, or scars and thyroid normal to palpation     RESP: Crackles at bases  and bronchial breathing on Rt side      CV: regular rates and rhythm, normal S1 S2, no S3 or S4 and no murmur, click or rub     ABDOMEN:  soft, nontender, no HSM or masses and bowel sounds normal     MS: extremities normal- no gross deformities noted, no evidence of inflammation in joints, FROM in all extremities.     SKIN: no suspicious lesions or rashes     NEURO: Normal strength and tone, sensory exam grossly normal, mentation intact and speech normal     PSYCH: mentation appears normal. and affect normal/bright     LYMPHATICS: No cervical adenopathy    No results for input(s): HGB, PLT, INR, NA, POTASSIUM, CR, A1C in the last 45757 hours.     Diagnostics:  No labs were ordered during this visit.   No EKG required for low risk surgery (cataract, skin procedure, breast biopsy, etc).  Recent ECHO in the hospital showed good EF  Revised Cardiac Risk Index (RCRI):  The patient has the following serious cardiovascular risks for perioperative complications:   - No serious cardiac risks = 0 points     RCRI Interpretation: 0 points: Class I (very low risk - 0.4% complication rate)           Signed Electronically by: Simon Torres MD  Copy of this evaluation report is provided to requesting physician.

## 2021-03-10 NOTE — PATIENT INSTRUCTIONS

## 2021-03-15 DIAGNOSIS — Z11.59 ENCOUNTER FOR SCREENING FOR OTHER VIRAL DISEASES: ICD-10-CM

## 2021-03-15 LAB
SARS-COV-2 RNA RESP QL NAA+PROBE: NORMAL
SPECIMEN SOURCE: NORMAL

## 2021-03-15 PROCEDURE — U0005 INFEC AGEN DETEC AMPLI PROBE: HCPCS | Performed by: INTERNAL MEDICINE

## 2021-03-15 PROCEDURE — U0003 INFECTIOUS AGENT DETECTION BY NUCLEIC ACID (DNA OR RNA); SEVERE ACUTE RESPIRATORY SYNDROME CORONAVIRUS 2 (SARS-COV-2) (CORONAVIRUS DISEASE [COVID-19]), AMPLIFIED PROBE TECHNIQUE, MAKING USE OF HIGH THROUGHPUT TECHNOLOGIES AS DESCRIBED BY CMS-2020-01-R: HCPCS | Performed by: INTERNAL MEDICINE

## 2021-03-16 ENCOUNTER — MYC MEDICAL ADVICE (OUTPATIENT)
Dept: FAMILY MEDICINE | Facility: CLINIC | Age: 71
End: 2021-03-16

## 2021-03-16 LAB
LABORATORY COMMENT REPORT: NORMAL
SARS-COV-2 RNA RESP QL NAA+PROBE: NEGATIVE
SPECIMEN SOURCE: NORMAL

## 2021-03-18 ENCOUNTER — MYC MEDICAL ADVICE (OUTPATIENT)
Dept: FAMILY MEDICINE | Facility: CLINIC | Age: 71
End: 2021-03-18

## 2021-03-23 ENCOUNTER — CARE COORDINATION (OUTPATIENT)
Dept: CARDIOLOGY | Facility: CLINIC | Age: 71
End: 2021-03-23

## 2021-03-23 DIAGNOSIS — Z20.822 ENCOUNTER FOR LABORATORY TESTING FOR COVID-19 VIRUS: Primary | ICD-10-CM

## 2021-03-23 NOTE — PROGRESS NOTES
Patient was called to request a covid test prior to right heart cath tomorrow. Patient is cancelling the right heart cath related to not feeling well. He will call back when he feels better to reschedule the right heart cath.

## 2021-04-07 DIAGNOSIS — Z11.59 ENCOUNTER FOR SCREENING FOR OTHER VIRAL DISEASES: ICD-10-CM

## 2021-04-18 ENCOUNTER — HEALTH MAINTENANCE LETTER (OUTPATIENT)
Age: 71
End: 2021-04-18

## 2021-04-18 DIAGNOSIS — Z11.59 ENCOUNTER FOR SCREENING FOR OTHER VIRAL DISEASES: ICD-10-CM

## 2021-04-18 LAB
SARS-COV-2 RNA RESP QL NAA+PROBE: NORMAL
SPECIMEN SOURCE: NORMAL

## 2021-04-18 PROCEDURE — U0003 INFECTIOUS AGENT DETECTION BY NUCLEIC ACID (DNA OR RNA); SEVERE ACUTE RESPIRATORY SYNDROME CORONAVIRUS 2 (SARS-COV-2) (CORONAVIRUS DISEASE [COVID-19]), AMPLIFIED PROBE TECHNIQUE, MAKING USE OF HIGH THROUGHPUT TECHNOLOGIES AS DESCRIBED BY CMS-2020-01-R: HCPCS | Performed by: INTERNAL MEDICINE

## 2021-04-18 PROCEDURE — U0005 INFEC AGEN DETEC AMPLI PROBE: HCPCS | Performed by: INTERNAL MEDICINE

## 2021-04-20 ENCOUNTER — TELEPHONE (OUTPATIENT)
Dept: CARDIOLOGY | Facility: CLINIC | Age: 71
End: 2021-04-20

## 2021-04-20 NOTE — TELEPHONE ENCOUNTER
Left voicemail for patient to complete Travel Screen for Cardiac Cath Lab appointment on 4/21/2021 and inform patient of updated Visitor Policy.  Covid Negative on 4/18/2021

## 2021-04-21 ENCOUNTER — APPOINTMENT (OUTPATIENT)
Dept: MEDSURG UNIT | Facility: CLINIC | Age: 71
End: 2021-04-21
Payer: MEDICARE

## 2021-04-21 ENCOUNTER — DOCUMENTATION ONLY (OUTPATIENT)
Dept: CARDIOLOGY | Facility: CLINIC | Age: 71
End: 2021-04-21

## 2021-04-21 ENCOUNTER — APPOINTMENT (OUTPATIENT)
Dept: LAB | Facility: CLINIC | Age: 71
End: 2021-04-21
Attending: INTERNAL MEDICINE
Payer: MEDICARE

## 2021-04-21 ENCOUNTER — HOSPITAL ENCOUNTER (OUTPATIENT)
Dept: MRI IMAGING | Facility: CLINIC | Age: 71
End: 2021-04-21
Attending: INTERNAL MEDICINE
Payer: MEDICARE

## 2021-04-21 ENCOUNTER — HOSPITAL ENCOUNTER (OUTPATIENT)
Facility: CLINIC | Age: 71
Discharge: HOME | End: 2021-04-21
Attending: INTERNAL MEDICINE | Admitting: INTERNAL MEDICINE
Payer: MEDICARE

## 2021-04-21 VITALS
HEART RATE: 76 BPM | DIASTOLIC BLOOD PRESSURE: 95 MMHG | TEMPERATURE: 98 F | SYSTOLIC BLOOD PRESSURE: 137 MMHG | OXYGEN SATURATION: 96 % | RESPIRATION RATE: 18 BRPM

## 2021-04-21 DIAGNOSIS — I27.20 PULMONARY HYPERTENSION (H): ICD-10-CM

## 2021-04-21 DIAGNOSIS — R93.1 ABNORMAL FINDINGS DIAGNOSTIC IMAGING OF HEART AND CORONARY CIRCULATION: ICD-10-CM

## 2021-04-21 DIAGNOSIS — I51.89 OTHER ILL-DEFINED HEART DISEASES: ICD-10-CM

## 2021-04-21 DIAGNOSIS — R93.1 ABNORMAL CT SCAN, HEART: ICD-10-CM

## 2021-04-21 LAB
ANION GAP SERPL CALCULATED.3IONS-SCNC: 4 MMOL/L (ref 3–14)
BUN SERPL-MCNC: 15 MG/DL (ref 7–30)
CALCIUM SERPL-MCNC: 9.3 MG/DL (ref 8.5–10.1)
CHLORIDE SERPL-SCNC: 99 MMOL/L (ref 94–109)
CO2 SERPL-SCNC: 32 MMOL/L (ref 20–32)
CREAT SERPL-MCNC: 0.7 MG/DL (ref 0.66–1.25)
ERYTHROCYTE [DISTWIDTH] IN BLOOD BY AUTOMATED COUNT: 13.5 % (ref 10–15)
GFR SERPL CREATININE-BSD FRML MDRD: >90 ML/MIN/{1.73_M2}
GLUCOSE SERPL-MCNC: 105 MG/DL (ref 70–99)
HCT VFR BLD AUTO: 39.9 % (ref 40–53)
HGB BLD-MCNC: 12.9 G/DL (ref 13.3–17.7)
INR PPP: 1 (ref 0.86–1.14)
MCH RBC QN AUTO: 29.5 PG (ref 26.5–33)
MCHC RBC AUTO-ENTMCNC: 32.3 G/DL (ref 31.5–36.5)
MCV RBC AUTO: 91 FL (ref 78–100)
PLATELET # BLD AUTO: 223 10E9/L (ref 150–450)
POTASSIUM SERPL-SCNC: 4.2 MMOL/L (ref 3.4–5.3)
RBC # BLD AUTO: 4.37 10E12/L (ref 4.4–5.9)
SODIUM SERPL-SCNC: 135 MMOL/L (ref 133–144)
WBC # BLD AUTO: 10.9 10E9/L (ref 4–11)

## 2021-04-21 PROCEDURE — 75565 CARD MRI VELOC FLOW MAPPING: CPT | Mod: 26 | Performed by: INTERNAL MEDICINE

## 2021-04-21 PROCEDURE — 272N000001 HC OR GENERAL SUPPLY STERILE: Performed by: INTERNAL MEDICINE

## 2021-04-21 PROCEDURE — 93451 RIGHT HEART CATH: CPT | Performed by: INTERNAL MEDICINE

## 2021-04-21 PROCEDURE — 75561 CARDIAC MRI FOR MORPH W/DYE: CPT | Mod: ME

## 2021-04-21 PROCEDURE — 75565 CARD MRI VELOC FLOW MAPPING: CPT | Mod: ME

## 2021-04-21 PROCEDURE — 80048 BASIC METABOLIC PNL TOTAL CA: CPT | Performed by: INTERNAL MEDICINE

## 2021-04-21 PROCEDURE — 85610 PROTHROMBIN TIME: CPT | Performed by: INTERNAL MEDICINE

## 2021-04-21 PROCEDURE — 93010 ELECTROCARDIOGRAM REPORT: CPT | Performed by: INTERNAL MEDICINE

## 2021-04-21 PROCEDURE — 999N000128 HC STATISTIC PERIPHERAL IV START W/O US GUIDANCE

## 2021-04-21 PROCEDURE — 999N000132 HC STATISTIC PP CARE STAGE 1

## 2021-04-21 PROCEDURE — 272N000002 HC OR SUPPLY OTHER OPNP: Performed by: INTERNAL MEDICINE

## 2021-04-21 PROCEDURE — G1004 CDSM NDSC: HCPCS | Performed by: INTERNAL MEDICINE

## 2021-04-21 PROCEDURE — 250N000009 HC RX 250: Performed by: INTERNAL MEDICINE

## 2021-04-21 PROCEDURE — 255N000002 HC RX 255 OP 636: Performed by: INTERNAL MEDICINE

## 2021-04-21 PROCEDURE — 75561 CARDIAC MRI FOR MORPH W/DYE: CPT | Mod: 26 | Performed by: INTERNAL MEDICINE

## 2021-04-21 PROCEDURE — 999N000054 HC STATISTIC EKG NON-CHARGEABLE

## 2021-04-21 PROCEDURE — 85027 COMPLETE CBC AUTOMATED: CPT | Performed by: INTERNAL MEDICINE

## 2021-04-21 PROCEDURE — C1894 INTRO/SHEATH, NON-LASER: HCPCS | Performed by: INTERNAL MEDICINE

## 2021-04-21 PROCEDURE — A9585 GADOBUTROL INJECTION: HCPCS | Performed by: INTERNAL MEDICINE

## 2021-04-21 RX ORDER — LIDOCAINE 40 MG/G
CREAM TOPICAL
Status: DISCONTINUED | OUTPATIENT
Start: 2021-04-21 | End: 2021-04-21 | Stop reason: HOSPADM

## 2021-04-21 RX ORDER — GADOBUTROL 604.72 MG/ML
7.5 INJECTION INTRAVENOUS ONCE
Status: COMPLETED | OUTPATIENT
Start: 2021-04-21 | End: 2021-04-21

## 2021-04-21 RX ORDER — PREDNISONE 10 MG/1
10 TABLET ORAL DAILY
COMMUNITY
End: 2022-01-01

## 2021-04-21 RX ORDER — SODIUM CHLORIDE 9 MG/ML
INJECTION, SOLUTION INTRAVENOUS CONTINUOUS
Status: DISCONTINUED | OUTPATIENT
Start: 2021-04-21 | End: 2021-04-21 | Stop reason: HOSPADM

## 2021-04-21 RX ADMIN — LIDOCAINE: 40 CREAM TOPICAL at 09:20

## 2021-04-21 RX ADMIN — GADOBUTROL 7.5 ML: 604.72 INJECTION INTRAVENOUS at 07:53

## 2021-04-21 NOTE — PROGRESS NOTES
CMR today (4/21/21): Normal LV and RV size/function/thickness, LVEF=55% RVEF 48% and no fibrosis on late gadolinium enhancement imaging.     RHC today (4/21/21) by Dr. English: Normal left and right-sided filling pressures, normal pulmonary artery pressure, and normal cardiac output. RA 1 30/1 30/12(16) W 10 CO/CI 5.8/3.5 PVR 1    Collectively, these findings are strongly supportive of normal RV size and function and normal PA pressure.     Overall, there is no evidence of pulmonary hypertension or RV dysfunction.    Hasmukh Reyes MD  Cardiology

## 2021-04-21 NOTE — PROGRESS NOTES
2A prep for RHC- pt alert, oriented and aware of planned procedure. VSS. 2L home 02 as baseline. Right pIV in placed from MRI. Lidocaine to right neck. Consent signed.

## 2021-04-21 NOTE — IP AVS SNAPSHOT
[facility] [address] [address]                                    After Visit Summary   4/21/2021    Josr Jamison    MRN: [id number]           After Visit Summary Signature Page    I have received my discharge instructions, and my questions have been answered. I have discussed any challenges I see with this plan with the nurse or doctor.    ..........................................................................................................................................  Patient/Patient Representative Signature      ..........................................................................................................................................  Patient Representative Print Name and Relationship to Patient    ..................................................               ................................................  Date                                   Time    ..........................................................................................................................................  Reviewed by Signature/Title    ...................................................              ..............................................  Date                                               Time          22EPIC Rev 08/18

## 2021-04-21 NOTE — PROGRESS NOTES
Pt has returned to unit 2a s/p RHC. Right neck site CDI, soft, flat, non tender. Pt declines food/drink.  Discharge instructions reviewed with pt, pt verbalizes understanding. PIV d/c'd.    1100 Pt declined wheelchair and ambulated off unit with steady gait.

## 2021-04-21 NOTE — DISCHARGE INSTRUCTIONS
[facility]                        Interventional Cardiology  Discharge Instructions   Post Right Heart Cath      AFTER YOU GO HOME:    DO drink plenty of fluids    DO resume your regular diet and medications unless otherwise instructed by your Primary Physician    Do Not scrub the procedure site vigorously    No lotion or powder to the puncture site for 3 days    CALL YOUR PRIMARY PHYSICIAN IF: You may resume all normal activity.  Monitor neck site for bleeding, swelling, or voice changes. If you notice bleeding or swelling immediately apply pressure to the site and call number below to speak with Cardiology Fellow.  If you experience any changes in your breathing you should call your doctor immediately or come to the closest Emergency Department.  Do not drive yourself.    ADDITIONAL INSTRUCTIONS: Medications: You are to resume all home medications including anticoagulation therapy unless otherwise advised by your primary cardiologist or nurse coordinator.    Follow Up: Per your primary cardiology team    If you have any questions or concerns regarding your procedure site please call [phone] at anytime and ask for Cardiology Fellow on call.  They are available 24 hours a day.  You may also contact the Cardiology Clinic after hours number at [phone].                                                       Telephone Numbers [phone] Monday-Friday 8:00 am to 4:30 pm    [phone] After 4:30 pm Monday-Friday, Weekends & Holidays  Ask for Interventional Cardiologist on call. Someone is on call 24 hours/day   [facility] toll free number [phone] Monday-Friday 8:00 am to 4:30 pm   [facility] Emergency Dept [phone]

## 2021-04-21 NOTE — IP AVS SNAPSHOT
After Visit Summary Template Not Found    This Print Group is only intended to be used in the After Visit Summary and can only be used in a report that uses a released After Visit Summary Template.                       MRN:[id number]                      After Visit Summary   4/21/2021    Josr Jamison    MRN: [id number]           Visit Information        Department      4/21/2021  6:52 AM [facility] [address] [place]          Review of your medicines      UNREVIEWED medicines. Ask your doctor about these medicines       Dose / Directions   * albuterol (2.5 MG/3ML) 0.083% neb solution  Commonly known as: PROVENTIL      Dose: 2.5 mg  Inhale 2.5 mg into the lungs  Refills: 0     * albuterol 108 (90 Base) MCG/ACT inhaler  Commonly known as: PROAIR HFA/PROVENTIL HFA/VENTOLIN HFA      Dose: 2 puff  Inhale 2 puffs into the lungs  Refills: 0     Anoro Ellipta 62.5-25 MCG/INH oral inhaler  Generic drug: umeclidinium-vilanterol      Refills: 0     aspirin 81 MG chewable tablet  Commonly known as: ASA  Used for: Coronary artery disease involving native coronary artery of native heart without angina pectoris      Dose: 81 mg  Take 1 tablet (81 mg) by mouth daily  Refills: 0     augmented betamethasone dipropionate 0.05 % external cream  Commonly known as: DIPROLENE-AF      Dose: 1 Application  Apply 1 Application topically  Refills: 0     fluocinonide 0.05 % external ointment  Commonly known as: LIDEX      Apply to the hands qhs, cover with thin layer of Vaseline and put on 100% cotton gloves for up to 1-2 wks  Refills: 0     IBUPROFEN PO      Refills: 0     lidocaine-prilocaine 2.5-2.5 % external cream  Commonly known as: EMLA  Used for: Needle phobia      Apply topically as needed for moderate pain  Quantity: 5 g  Refills: 3     methylphenidate 20 MG CR tablet  Commonly known as: METADATE ER      Quantity:    Refills: 0     metroNIDAZOLE 0.75 % external gel  Commonly known as: METROGEL      Dose: 1  Application  Apply 1 Application topically  Refills: 0     MULTIVITAMIN & MINERAL PO      Take  by mouth.  Refills: 0     predniSONE 10 MG tablet  Commonly known as: DELTASONE      Dose: 10 mg  Take 10 mg by mouth daily  Refills: 0     rosuvastatin 10 MG tablet  Commonly known as: CRESTOR  Used for: Coronary artery disease involving native coronary artery of native heart without angina pectoris      Dose: 10 mg  Take 1 tablet (10 mg) by mouth daily  Quantity: 30 tablet  Refills: 11         * This list has 2 medication(s) that are the same as other medications prescribed for you. Read the directions carefully, and ask your doctor or other care provider to review them with you.                  Protect others around you: Learn how to safely use, store and throw away your medicines at www.disposemymeds.org.       Follow-ups after your visit       Your next 10 appointments already scheduled    Jun 03, 2021  1:00 PM  Return Visit with Hasmukh Reyes MD  [facility] [place] [address] [place] [id number] [phone]         Care Instructions       Further instructions from your care team       [facility]                        Interventional Cardiology  Discharge Instructions   Post Right Heart Cath      AFTER YOU GO HOME:    DO drink plenty of fluids    DO resume your regular diet and medications unless otherwise instructed by your Primary Physician    Do Not scrub the procedure site vigorously    No lotion or powder to the puncture site for 3 days    CALL YOUR PRIMARY PHYSICIAN IF: You may resume all normal activity.  Monitor neck site for bleeding, swelling, or voice changes. If you notice bleeding or swelling immediately apply pressure to the site and call number below to speak with Cardiology Fellow.  If you experience any changes in your breathing you should call your doctor immediately or come to the  closest Emergency Department.  Do not drive yourself.    ADDITIONAL INSTRUCTIONS: Medications: You are to resume all home medications including anticoagulation therapy unless otherwise advised by your primary cardiologist or nurse coordinator.    Follow Up: Per your primary cardiology team    If you have any questions or concerns regarding your procedure site please call [phone] at anytime and ask for Cardiology Fellow on call.  They are available 24 hours a day.  You may also contact the Cardiology Clinic after hours number at [phone].                                                       Telephone Numbers [phone] Monday-Friday 8:00 am to 4:30 pm    [phone] After 4:30 pm Monday-Friday, Weekends & Holidays  Ask for Interventional Cardiologist on call. Someone is on call 24 hours/day   [facility] toll free number [phone] Monday-Friday 8:00 am to 4:30 pm   [facility] Emergency Dept [phone]                   Additional Information About Your Visit       [employer] Information    [employer] gives you secure access to your electronic health record. If you see a primary care provider, you can also send messages to your care team and make appointments. If you have questions, please call your primary care clinic.  If you do not have a primary care provider, please call [phone] and they will assist you.       Care EveryWhere ID    This is your Care EveryWhere ID. This could be used by other organizations to access your [place] medical records  [id number]       Your Vitals Were  Most recent update: 4/21/2021  9:13 AM    Blood Pressure   140/94      Pulse   79    Temperature   98  F (36.7  C)    Respirations   18    Pulse Oximetry   97%          Primary Care Provider Office Phone # Fax #    Elizabeth Kebede [phone]      Equal Access to Services    JOSE MARIA BERGMAN AH: Mook Wong, luiz ayoub, day scott  ron deleonaan ah. So [facility] [phone].    ATENCIÓN: Si brian sanchez, tiene a sewell disposición servicios gratuitos de asistencia lingüística. Devyn al [phone].    We comply with applicable federal and state civil rights laws, including the [place] Human Rights Act. We do not discriminate on the basis of race, color, creed, Mandaeism, national origin, marital status, age, disability, sex, sexual orientation, or gender identity.    If you would like an itemization of your charges they will now be available in [employer] 30 days after discharge. To access the itemized statements in [employer] go to billing/billing summary. From there select view account. There will be multiple tabs showing an overview of your account, detail, payments, and communications. From the communications tab you can see your monthly statements, your itemized statements, and any billing letters generated for your account. If you do not have a [employer] account and need help getting access please contact [employer] support at [phone].  If you would prefer to have your itemized statements mailed please contact our automated itemized bill request line at [phone] option  2.       Thank you!    Thank you for choosing [facility] for your care. Our goal is always to provide you with excellent care. Hearing back from our patients is one way we can continue to improve our services. Please take a few minutes to complete the written survey that you may receive in the mail after you visit with us. Thank you!            Medication List      ASK your doctor about these medications          Morning Afternoon Evening Bedtime As Needed    * albuterol (2.5 MG/3ML) 0.083% neb solution  Also known as: PROVENTIL  INSTRUCTIONS: Inhale 2.5 mg into the lungs                     * albuterol 108 (90 Base) MCG/ACT inhaler  Also known as: PROAIR HFA/PROVENTIL HFA/VENTOLIN HFA  INSTRUCTIONS: Inhale 2 puffs into the lungs  Doctor's comments: Pharmacy may dispense brand  covered by insurance (Proair, or proventil or ventolin or generic albuterol inhaler)                     Anoro Ellipta 62.5-25 MCG/INH oral inhaler  Generic drug: umeclidinium-vilanterol                     aspirin 81 MG chewable tablet  Also known as: ASA  INSTRUCTIONS: Take 1 tablet (81 mg) by mouth daily                     augmented betamethasone dipropionate 0.05 % external cream  Also known as: DIPROLENE-AF  INSTRUCTIONS: Apply 1 Application topically                     fluocinonide 0.05 % external ointment  Also known as: LIDEX  INSTRUCTIONS: Apply to the hands qhs, cover with thin layer of Vaseline and put on 100% cotton gloves for up to 1-2 wks                     IBUPROFEN PO                     lidocaine-prilocaine 2.5-2.5 % external cream  Also known as: EMLA  INSTRUCTIONS: Apply topically as needed for moderate pain                     methylphenidate 20 MG CR tablet  Also known as: METADATE ER                     metroNIDAZOLE 0.75 % external gel  Also known as: METROGEL  INSTRUCTIONS: Apply 1 Application topically                     MULTIVITAMIN & MINERAL PO  INSTRUCTIONS: Take  by mouth.                     predniSONE 10 MG tablet  Also known as: DELTASONE  INSTRUCTIONS: Take 10 mg by mouth daily                     rosuvastatin 10 MG tablet  Also known as: CRESTOR  INSTRUCTIONS: Take 1 tablet (10 mg) by mouth daily                        * This list has 2 medication(s) that are the same as other medications prescribed for you. Read the directions carefully, and ask your doctor or other care provider to review them with you.

## 2021-04-22 LAB — INTERPRETATION ECG - MUSE: NORMAL

## 2021-05-03 ENCOUNTER — MYC MEDICAL ADVICE (OUTPATIENT)
Dept: FAMILY MEDICINE | Facility: CLINIC | Age: 71
End: 2021-05-03

## 2021-05-03 ENCOUNTER — E-VISIT (OUTPATIENT)
Dept: FAMILY MEDICINE | Facility: CLINIC | Age: 71
End: 2021-05-03
Payer: MEDICARE

## 2021-05-03 DIAGNOSIS — F40.298 NEEDLE PHOBIA: ICD-10-CM

## 2021-05-03 PROCEDURE — 99421 OL DIG E/M SVC 5-10 MIN: CPT | Performed by: PHYSICIAN ASSISTANT

## 2021-05-04 RX ORDER — LIDOCAINE/PRILOCAINE 2.5 %-2.5%
CREAM (GRAM) TOPICAL PRN
Qty: 5 G | Refills: 3 | Status: SHIPPED | OUTPATIENT
Start: 2021-05-04

## 2021-05-05 ENCOUNTER — PATIENT OUTREACH (OUTPATIENT)
Dept: FAMILY MEDICINE | Facility: CLINIC | Age: 71
End: 2021-05-05
Payer: MEDICARE

## 2021-05-05 NOTE — TELEPHONE ENCOUNTER
He is on Aspirin and Statin   Not sure why he is failing the [facility]  He need to be removed from the failing quality list .

## 2021-06-03 ENCOUNTER — VIRTUAL VISIT (OUTPATIENT)
Dept: CARDIOLOGY | Facility: CLINIC | Age: 71
End: 2021-06-03
Attending: INTERNAL MEDICINE
Payer: MEDICARE

## 2021-06-03 DIAGNOSIS — I27.20 PULMONARY HYPERTENSION (H): ICD-10-CM

## 2021-06-03 DIAGNOSIS — I51.7 CARDIOMEGALY: ICD-10-CM

## 2021-06-03 DIAGNOSIS — R06.02 SOB (SHORTNESS OF BREATH): ICD-10-CM

## 2021-06-03 DIAGNOSIS — R93.1 ABNORMAL CT SCAN, HEART: ICD-10-CM

## 2021-06-03 DIAGNOSIS — Z78.9 STATIN INTOLERANCE: ICD-10-CM

## 2021-06-03 DIAGNOSIS — R93.1 ABNORMAL FINDINGS DIAGNOSTIC IMAGING OF HEART AND CORONARY CIRCULATION: ICD-10-CM

## 2021-06-03 DIAGNOSIS — I25.10 CORONARY ARTERY DISEASE INVOLVING NATIVE CORONARY ARTERY OF NATIVE HEART WITHOUT ANGINA PECTORIS: Primary | ICD-10-CM

## 2021-06-03 DIAGNOSIS — I51.89 OTHER ILL-DEFINED HEART DISEASES: ICD-10-CM

## 2021-06-03 PROCEDURE — 99214 OFFICE O/P EST MOD 30 MIN: CPT | Mod: 95 | Performed by: INTERNAL MEDICINE

## 2021-06-03 RX ORDER — PRAVASTATIN SODIUM 20 MG
20 TABLET ORAL DAILY
Qty: 30 TABLET | Refills: 11 | Status: SHIPPED | OUTPATIENT
Start: 2021-06-03 | End: 2022-01-01

## 2021-06-03 NOTE — PROGRESS NOTES
Sean is a 71 year old who is being evaluated via a billable video visit.      Video-Visit Details    Type of service:  Video Visit  Video Start Time: 12:47 PM  Video End Time:1:15 PM  Originating Location (pt. Location): Home  Distant Location (provider location):  [facility] HEART [facility]   Platform used for Video Visit: Gary         Chief complaint: follow up of abnormal cardiac imaging    HPI:   Josr Jamison is a 71 year old male former smoker (1.5 ppd x 20y) with no prior cardiac history referred for multiple abnormal cardiovascular findings on imaging who presents for follow up after cardiac MRI a.     CAD risk factor profile is: +former tobacco (30 pk-y) -DM -HTN +HL (elevated LDL in 2014)     3/4/21:  He was admitted to [facility] in December for PNA (suspected to have been non-COVID viral pneumonia) and hypoxia from which he was still on supplemental oxygen at the time of primary care visit 2/26/21. CTA chest 12/22/20 was notable for prominent central PAs suggestive of pulmonary hypertension. TTE (see below) showed normal LV function, grade 1 diastolic dysfunction, possible RV dysfunction, and mildly elevated PA pressure (RVSP 37 mmHg+RAP 3mmHg.) Subsequent non-contrast CT chest 2/9/21 again showed parenchymal lung findings (see below) and also reported coronary artery calcification and cardiomegaly. He is referred for evaluation of possible pulmonary hypertension, possible RV dysfunction, and coronary artery calcifications.     He is currently on 3L supplemental O2. He was previously active and able to golf as recently as 6 months ago and had no functional limitations as recently as 1 week prior to his admission in December, he was regularly exercising by walking up 6 flights of stairs without stopping without significant dyspnea. He has been a vegetarian since his 20s. He does feel that he has had some degree of improvement since his admission, but he remains limited and would  become dyspneic walking up even 1 flight of stairs with supplemental O2. He has lightheadedness when he is extremely dyspneic (last happened prior to his admission in December) but never at other times and never since discharge.    His family history is notable of CAD in his father (CABG in his 70s,  at 89) and brother (CABG at 59.) No known family history of premature CAD.     Interval history 21:  He underwent cardiac MRI and RHC (see below) on 21 which respectively showed normal LV/RV size/function and normal filling pressures without evidence of pulmonary hypertension.    Since his last visit, he has weaned down his supplemental O2 requirement from 3L to 0.5L-1L. He also reports that his dyspnea is dramatically improved both at rest and with exertion; he was recently able to carry potting soil up a flight of stairs and had to pause after several trips up and down the stairs but did not have to sit down in rest. He feels his exertional capacity continues to steadily improve day to day and week to week. He was on the [place] several weeks ago and was able to walk around without any limitation. He had been started on rosuvastatin at his last visit but was unable to tolerate it due to constipation and cramping in his pectoral muscles, which both started 1-2 days after beginning rosuvastatin. Both symptoms resolved within 2 days of stopping rosuvastatin.     He denies any chest pain, PND, orthopnea, peripheral edema, palpitations, lightheadedness or syncope.     PAST MEDICAL HISTORY:  Past Medical History:   Diagnosis Date     ADD (attention deficit disorder)      ADD (attention deficit disorder)      CARDIOVASCULAR SCREENING; LDL GOAL LESS THAN 130 2013    [place] Risk Score: 12% (13 Total Points)      Cataract     bilateral     Clavicle fracture      Foot fracture, right      Horseback riding age 27    bucked off horse, torn urethra, catheter for a time     Hyperlipidemia LDL goal <130  2/22/2013    [place] Risk Score: 12% (13 Total Points)      Hypertension goal BP (blood pressure) < 140/90 4/4/2013    Attempting to control with diet and exercise     Lung disease        CURRENT MEDICATIONS:  Current Outpatient Medications   Medication Sig Dispense Refill     augmented betamethasone dipropionate (DIPROLENE-AF) 0.05 % external cream Apply 1 Application topically       fluocinonide (LIDEX) 0.05 % external ointment Apply to the hands qhs, cover with thin layer of Vaseline and put on 100% cotton gloves for up to 1-2 wks       IBUPROFEN PO        lidocaine-prilocaine (EMLA) 2.5-2.5 % external cream Apply topically as needed for moderate pain 5 g 3     methylphenidate (METADATE ER) 20 MG CR tablet        metroNIDAZOLE (METROGEL) 0.75 % external gel Apply 1 Application topically       Multiple Vitamins-Minerals (MULTIVITAMIN & MINERAL PO) Take  by mouth.       predniSONE (DELTASONE) 10 MG tablet Take 10 mg by mouth daily       umeclidinium-vilanterol (ANORO ELLIPTA) 62.5-25 MCG/INH oral inhaler        aspirin (ASA) 81 MG chewable tablet Take 1 tablet (81 mg) by mouth daily (Patient not taking: Reported on 6/3/2021)       rosuvastatin (CRESTOR) 10 MG tablet Take 1 tablet (10 mg) by mouth daily (Patient not taking: Reported on 6/3/2021) 30 tablet 11       PAST SURGICAL HISTORY:  Past Surgical History:   Procedure Laterality Date     CATARACT IOL, RT/LT      bilateral     COLONOSCOPY  4/25/2013     CV RIGHT HEART CATH MEASUREMENTS RECORDED N/A 4/21/2021    Procedure: CV RIGHT HEART CATH;  Surgeon: Amador English MD;  Location: [facility] CARDIAC CATH LAB     HERNIORRHAPHY INGUINAL Right 4/7/2016    Procedure: HERNIORRHAPHY INGUINAL;  Surgeon: Andriy Sheehan MD;  Location:  OR     LAPAROSCOPIC HERNIORRHAPHY INGUINAL  5/9/2013    Procedure: LAPAROSCOPIC HERNIORRHAPHY INGUINAL;  Laparoscopic inguinal hernia repair;  Surgeon: Andriy Sheehan MD;  Location:  OR     LASIK  2007     MOUTH  SURGERY  In his 20's    [place] Teeth and tooth pulled     OPEN REDUCTION INTERNAL FIXATION CLAVICLE  2014    Procedure: OPEN REDUCTION INTERNAL FIXATION CLAVICLE;  Surgeon: Manjinder Landon MD;  Location:  OR       ALLERGIES:     Allergies   Allergen Reactions     Ether      Told as a child -        FAMILY HISTORY:  His family history is notable of CAD in his father (CABG in his 70s,  at 89) and brother (CABG at 59.) No known family history of premature CAD.     SOCIAL HISTORY:  Social History     Tobacco Use     Smoking status: Former Smoker     Packs/day: 1.50     Years: 20.00     Pack years: 30.00     Quit date: 1995     Years since quittin.4     Smokeless tobacco: Never Used   Substance Use Topics     Alcohol use: Yes     Comment: couple times per week - 2-3     Drug use: No       ROS:   A comprehensive 14 point review of systems is negative other than as mentioned in HPI.    Exam:  CONSITUTIONAL: no acute distress. Wearing nasal cannula.   HEENT: no icterus, sclerae white  CV: no visible edema of visualized upper extremities, no gross JVD  CHEST: respirations nonlabored, no audible wheezing  NEURO: AA&Ox3, speech fluent/appropriate, motor grossly nonfocal  PSYCH: cooperative, affect appropriate  DERM: no rashes on visualized face/neck/upper extremities    The rest of a comprehensive physical examination is deferred due to video visit restrictions.        Labs:  Reviewed.       Testing/Procedures:  RHC 21n: Normal left and right-sided filling pressures, normal pulmonary artery pressure, and normal cardiac output. RA 1  30/12(16) W 10 CO/CI 5.8/3.5 PVR 1    I personally visualized and interpreted:  CMR 21: Normal LV and RV size/function/thickness, LVEF=55% RVEF 48% and no fibrosis on late gadolinium enhancement imaging.         Outside results of note:  Outside records from [facility] were obtained, and relevant results/notes have been incorporated into HPI.    TTE 20  ([facility]):  The left ventricle is normal size. The left ventricular systolic function is normal, estimated LVEF 55-60%. Left ventricular wall motion is normal.  Quantitatively reduced right ventricular systolic function.  There is mild to moderate tricuspid regurgitation.  Borderline pulmonary hypertension, estimated right ventricular systolic pressure is 37 mmHg. RAP~3 mmHg.  There is no pericardial effusion.  Diastolic parameters: Grade 1 diastolic dysfunction. Left-sided filling pressures indeterminate (PAP~40 mmHg suggests elevation but E/e'<14.) E/A 0.87, Medial e' 9 cm/s, lateral e' 11.8 cm/s, E/e' medial 7.6, lateral 9.3. TR velocity 2.9 m/s, LADI 42 mL/m2.    CTA chest 12/22/21 ([facility]):  1.  No evidence of pulmonary embolism. Prominence of the central pulmonary arteries consistent with pulmonary arterial hypertension.  2.  Diffusely prominent interstitial pulmonary opacities scattered throughout both lungs with some superimposed groundglass pulmonary opacities bilaterally. The appearance is nonspecific. Differential considerations would include interstitial pulmonary edema, interstitial fibrosis, interstitial infiltrates or a combination of these three etiologies.  3.  Cardiomegaly.  4.  Minimal bilateral pleural effusions.  5.  Minimal right upper quadrant ascites.  6. No prior study for comparison.    CT chest w/o contrast 2/9/21 ([facility]):  1.  Redemonstration of subpleural intralobular interstitial thickening in the upper, mid, and lower lung zones bilaterally presumably representing fibrosis.  2.  Redemonstration of patchy groundglass opacities in the upper, mid, and lower lung zones bilaterally, less conspicuous compared to the previous study.  3.  No new pulmonary parenchymal opacities are seen.  4.  Prominent right paratracheal and aorticopulmonary window lymph nodes again noted, decreased from the previous study. Previously noted subcarinal and AE recess lymph nodes are no  longer seen.  5.  Extensive coronary artery calcifications indicating coronary artery disease.  6.  Cardiomegaly again noted.    Assessment and Plan:   1. Suspected pulmonary hypertension (suggested by TTE and dilated PA on CTA)  2. Possible RV dysfunction  3. Cardiomegaly on CT chest  4. Chronic hypoxemic respiratory failure on 0.5L-1L supplemental O2, improving  With gold-standard testing (CMR and RHC) showing normal LV/RV chamber size/function and normal filling presures without pulmonary hypertension, no further workup for PH or other cardiovascular etiology of dyspnea/hypoxemia is indicated at this time.   No further cardiac workup for previously-identified abnormal imaging findings or evaluation for pulmonary hypertension is indicated at this time.    5. CAD without angina (coronary calcifications on CT):  6. Myalgia/statin intolerance  Discussed again that coronary calcifications that this represent coronary artery disease and merit aggressive preventative therapy. In the absence of anginal symptoms and with hypoxemia and dyspnea more likely related to pulmonary disease, there is no compelling indication for ischemic evaluation at this time. I discussed that if Sean develops any potential anginal symptoms (chest pain/pressure, dyspnea not explained by pulmonary disease) in the future, this would merit immediate evaluation for significant CAD and that I would have a low threshold for first-line invasive coronary angiography in this case given his relatively high pretest probability with known atheroslclerotic CAD.  He was unfortunately intolerant of high-intensity statin therapy with rosuvastatin; will attempt trial of lower-intensity therapy with pravastatin with gradual intensification of therapy as tolerated.    -continue ASA 81 mg daily   -stop rosuvastatin 10 mg daily   -start pravastatin 20 mg daily    Follow up in 1 year with fasting lipids prior    Chart review time: 10 minutes  Video time: 28  minutes  Total time spent: 38 minutes    The patient states understanding and is agreeable with plan.     Hasmukh Reyes MD  Cardiology    CC  RADHA MAKI

## 2021-10-02 ENCOUNTER — HEALTH MAINTENANCE LETTER (OUTPATIENT)
Age: 71
End: 2021-10-02

## 2021-10-04 ENCOUNTER — IMMUNIZATION (OUTPATIENT)
Dept: NURSING | Facility: CLINIC | Age: 71
End: 2021-10-04
Payer: MEDICARE

## 2021-10-04 PROCEDURE — 91301 PR COVID VAC MODERNA 100 MCG/0.5 ML IM: CPT

## 2021-10-04 PROCEDURE — 0013A PR COVID VAC MODERNA 100 MCG/0.5 ML IM: CPT

## 2021-12-04 ENCOUNTER — MYC MEDICAL ADVICE (OUTPATIENT)
Dept: FAMILY MEDICINE | Facility: CLINIC | Age: 71
End: 2021-12-04
Payer: MEDICARE

## 2021-12-04 DIAGNOSIS — Z20.822 ENCOUNTER FOR LABORATORY TESTING FOR COVID-19 VIRUS: Primary | ICD-10-CM

## 2021-12-23 DIAGNOSIS — J84.9 ILD (INTERSTITIAL LUNG DISEASE) (H): Primary | ICD-10-CM

## 2022-01-01 ENCOUNTER — HEALTH MAINTENANCE LETTER (OUTPATIENT)
Age: 72
End: 2022-01-01

## 2022-01-01 ENCOUNTER — HOSPITAL ENCOUNTER (INPATIENT)
Facility: CLINIC | Age: 72
LOS: 3 days | Discharge: HOME | DRG: 196 | End: 2022-04-05
Attending: EMERGENCY MEDICINE | Admitting: HOSPITALIST
Payer: MEDICARE

## 2022-01-01 ENCOUNTER — ANCILLARY PROCEDURE (OUTPATIENT)
Dept: CT IMAGING | Facility: CLINIC | Age: 72
End: 2022-01-01
Payer: MEDICARE

## 2022-01-01 ENCOUNTER — APPOINTMENT (OUTPATIENT)
Dept: CT IMAGING | Facility: CLINIC | Age: 72
DRG: 196 | End: 2022-01-01
Attending: EMERGENCY MEDICINE
Payer: MEDICARE

## 2022-01-01 ENCOUNTER — TELEPHONE (OUTPATIENT)
Dept: FAMILY MEDICINE | Facility: CLINIC | Age: 72
End: 2022-01-01

## 2022-01-01 ENCOUNTER — MEDICAL CORRESPONDENCE (OUTPATIENT)
Dept: FAMILY MEDICINE | Facility: CLINIC | Age: 72
End: 2022-01-01

## 2022-01-01 ENCOUNTER — TELEPHONE (OUTPATIENT)
Dept: PULMONOLOGY | Facility: CLINIC | Age: 72
End: 2022-01-01

## 2022-01-01 ENCOUNTER — TELEPHONE (OUTPATIENT)
Dept: FAMILY MEDICINE | Facility: CLINIC | Age: 72
End: 2022-01-01
Payer: MEDICARE

## 2022-01-01 ENCOUNTER — APPOINTMENT (OUTPATIENT)
Dept: CARDIOLOGY | Facility: CLINIC | Age: 72
DRG: 196 | End: 2022-01-01
Attending: HOSPITALIST
Payer: MEDICARE

## 2022-01-01 ENCOUNTER — MYC MEDICAL ADVICE (OUTPATIENT)
Dept: FAMILY MEDICINE | Facility: CLINIC | Age: 72
End: 2022-01-01
Payer: MEDICARE

## 2022-01-01 ENCOUNTER — MEDICAL CORRESPONDENCE (OUTPATIENT)
Dept: HEALTH INFORMATION MANAGEMENT | Facility: CLINIC | Age: 72
End: 2022-01-01

## 2022-01-01 ENCOUNTER — TELEPHONE (OUTPATIENT)
Dept: CARDIOLOGY | Facility: CLINIC | Age: 72
End: 2022-01-01
Payer: MEDICARE

## 2022-01-01 ENCOUNTER — APPOINTMENT (OUTPATIENT)
Dept: CT IMAGING | Facility: CLINIC | Age: 72
DRG: 177 | End: 2022-01-01
Attending: EMERGENCY MEDICINE
Payer: MEDICARE

## 2022-01-01 ENCOUNTER — LAB (OUTPATIENT)
Dept: LAB | Facility: CLINIC | Age: 72
End: 2022-01-01
Payer: MEDICARE

## 2022-01-01 ENCOUNTER — MYC MEDICAL ADVICE (OUTPATIENT)
Dept: PULMONOLOGY | Facility: CLINIC | Age: 72
End: 2022-01-01

## 2022-01-01 ENCOUNTER — PATIENT OUTREACH (OUTPATIENT)
Dept: CARE COORDINATION | Facility: CLINIC | Age: 72
End: 2022-01-01
Payer: MEDICARE

## 2022-01-01 ENCOUNTER — OFFICE VISIT (OUTPATIENT)
Dept: PULMONOLOGY | Facility: CLINIC | Age: 72
End: 2022-01-01
Payer: MEDICARE

## 2022-01-01 ENCOUNTER — HOSPITAL ENCOUNTER (INPATIENT)
Facility: CLINIC | Age: 72
LOS: 5 days | Discharge: HOME | DRG: 177 | End: 2022-02-12
Attending: EMERGENCY MEDICINE | Admitting: HOSPITALIST
Payer: MEDICARE

## 2022-01-01 ENCOUNTER — VIRTUAL VISIT (OUTPATIENT)
Dept: FAMILY MEDICINE | Facility: CLINIC | Age: 72
End: 2022-01-01
Payer: MEDICARE

## 2022-01-01 ENCOUNTER — NURSE TRIAGE (OUTPATIENT)
Dept: NURSING | Facility: CLINIC | Age: 72
End: 2022-01-01
Payer: MEDICARE

## 2022-01-01 ENCOUNTER — TELEPHONE (OUTPATIENT)
Facility: CLINIC | Age: 72
End: 2022-01-01

## 2022-01-01 ENCOUNTER — OFFICE VISIT (OUTPATIENT)
Dept: NURSING | Facility: CLINIC | Age: 72
End: 2022-01-01
Payer: MEDICARE

## 2022-01-01 ENCOUNTER — MYC MEDICAL ADVICE (OUTPATIENT)
Dept: PULMONOLOGY | Facility: CLINIC | Age: 72
End: 2022-01-01
Payer: MEDICARE

## 2022-01-01 VITALS
WEIGHT: 133 LBS | RESPIRATION RATE: 16 BRPM | SYSTOLIC BLOOD PRESSURE: 146 MMHG | HEART RATE: 84 BPM | BODY MASS INDEX: 20.16 KG/M2 | OXYGEN SATURATION: 91 % | DIASTOLIC BLOOD PRESSURE: 76 MMHG | HEIGHT: 68 IN

## 2022-01-01 VITALS
DIASTOLIC BLOOD PRESSURE: 69 MMHG | BODY MASS INDEX: 21.05 KG/M2 | SYSTOLIC BLOOD PRESSURE: 111 MMHG | TEMPERATURE: 98.2 F | HEIGHT: 68 IN | RESPIRATION RATE: 20 BRPM | WEIGHT: 138.89 LBS | OXYGEN SATURATION: 95 % | HEART RATE: 72 BPM

## 2022-01-01 VITALS
BODY MASS INDEX: 21.29 KG/M2 | SYSTOLIC BLOOD PRESSURE: 142 MMHG | OXYGEN SATURATION: 94 % | WEIGHT: 139 LBS | TEMPERATURE: 98.1 F | DIASTOLIC BLOOD PRESSURE: 82 MMHG | HEART RATE: 88 BPM | RESPIRATION RATE: 18 BRPM

## 2022-01-01 VITALS
HEIGHT: 68 IN | BODY MASS INDEX: 20.26 KG/M2 | TEMPERATURE: 98.5 F | DIASTOLIC BLOOD PRESSURE: 79 MMHG | RESPIRATION RATE: 18 BRPM | OXYGEN SATURATION: 92 % | HEART RATE: 74 BPM | SYSTOLIC BLOOD PRESSURE: 118 MMHG | WEIGHT: 133.7 LBS

## 2022-01-01 VITALS — BODY MASS INDEX: 21.37 KG/M2 | HEART RATE: 88 BPM | WEIGHT: 139.5 LBS

## 2022-01-01 DIAGNOSIS — J84.9 ILD (INTERSTITIAL LUNG DISEASE) (H): Primary | ICD-10-CM

## 2022-01-01 DIAGNOSIS — J96.01 ACUTE RESPIRATORY FAILURE WITH HYPOXIA (H): ICD-10-CM

## 2022-01-01 DIAGNOSIS — I21.4 NSTEMI (NON-ST ELEVATED MYOCARDIAL INFARCTION) (H): Primary | ICD-10-CM

## 2022-01-01 DIAGNOSIS — R06.02 SHORTNESS OF BREATH: ICD-10-CM

## 2022-01-01 DIAGNOSIS — J84.9 ILD (INTERSTITIAL LUNG DISEASE) (H): ICD-10-CM

## 2022-01-01 DIAGNOSIS — I50.9 ACUTE CONGESTIVE HEART FAILURE, UNSPECIFIED HEART FAILURE TYPE (H): ICD-10-CM

## 2022-01-01 DIAGNOSIS — R09.02 HYPOXIA: ICD-10-CM

## 2022-01-01 DIAGNOSIS — F41.0 ANXIETY ATTACK: Primary | ICD-10-CM

## 2022-01-01 DIAGNOSIS — U07.1 INFECTION DUE TO 2019 NOVEL CORONAVIRUS: ICD-10-CM

## 2022-01-01 DIAGNOSIS — Z71.89 OTHER SPECIFIED COUNSELING: ICD-10-CM

## 2022-01-01 LAB
6 MIN WALK (FT): 1300 FT
6 MIN WALK (M): 396 M
ALBUMIN SERPL-MCNC: 2.5 G/DL (ref 3.4–5)
ALBUMIN SERPL-MCNC: 2.6 G/DL (ref 3.4–5)
ALBUMIN SERPL-MCNC: 2.8 G/DL (ref 3.4–5)
ALBUMIN SERPL-MCNC: 3.2 G/DL (ref 3.4–5)
ALP SERPL-CCNC: 54 U/L (ref 40–150)
ALP SERPL-CCNC: 56 U/L (ref 40–150)
ALP SERPL-CCNC: 59 U/L (ref 40–150)
ALP SERPL-CCNC: 91 U/L (ref 40–150)
ALT SERPL W P-5'-P-CCNC: 22 U/L (ref 0–70)
ALT SERPL W P-5'-P-CCNC: 23 U/L (ref 0–70)
ALT SERPL W P-5'-P-CCNC: 24 U/L (ref 0–70)
ALT SERPL W P-5'-P-CCNC: 75 U/L (ref 0–70)
ANION GAP SERPL CALCULATED.3IONS-SCNC: 1 MMOL/L (ref 3–14)
ANION GAP SERPL CALCULATED.3IONS-SCNC: 2 MMOL/L (ref 3–14)
ANION GAP SERPL CALCULATED.3IONS-SCNC: 3 MMOL/L (ref 3–14)
ANION GAP SERPL CALCULATED.3IONS-SCNC: 4 MMOL/L (ref 3–14)
ANION GAP SERPL CALCULATED.3IONS-SCNC: 5 MMOL/L (ref 3–14)
ANION GAP SERPL CALCULATED.3IONS-SCNC: 8 MMOL/L (ref 3–14)
AST SERPL W P-5'-P-CCNC: 20 U/L (ref 0–45)
AST SERPL W P-5'-P-CCNC: 22 U/L (ref 0–45)
AST SERPL W P-5'-P-CCNC: 27 U/L (ref 0–45)
AST SERPL W P-5'-P-CCNC: 54 U/L (ref 0–45)
ATRIAL RATE - MUSE: 103 BPM
ATRIAL RATE - MUSE: 80 BPM
BASE EXCESS BLDV CALC-SCNC: 8.1 MMOL/L (ref -7.7–1.9)
BASE EXCESS BLDV CALC-SCNC: 9.4 MMOL/L (ref -7.7–1.9)
BASOPHILS # BLD AUTO: 0 10E3/UL (ref 0–0.2)
BASOPHILS # BLD AUTO: 0 10E3/UL (ref 0–0.2)
BASOPHILS NFR BLD AUTO: 0 %
BASOPHILS NFR BLD AUTO: 0 %
BILIRUB SERPL-MCNC: 0.2 MG/DL (ref 0.2–1.3)
BILIRUB SERPL-MCNC: 0.2 MG/DL (ref 0.2–1.3)
BILIRUB SERPL-MCNC: 0.4 MG/DL (ref 0.2–1.3)
BILIRUB SERPL-MCNC: 0.5 MG/DL (ref 0.2–1.3)
BUN SERPL-MCNC: 11 MG/DL (ref 7–30)
BUN SERPL-MCNC: 12 MG/DL (ref 7–30)
BUN SERPL-MCNC: 13 MG/DL (ref 7–30)
BUN SERPL-MCNC: 14 MG/DL (ref 7–30)
BUN SERPL-MCNC: 14 MG/DL (ref 7–30)
BUN SERPL-MCNC: 15 MG/DL (ref 7–30)
BUN SERPL-MCNC: 16 MG/DL (ref 7–30)
BUN SERPL-MCNC: 19 MG/DL (ref 7–30)
BUN SERPL-MCNC: 20 MG/DL (ref 7–30)
CALCIUM SERPL-MCNC: 10 MG/DL (ref 8.5–10.1)
CALCIUM SERPL-MCNC: 8.9 MG/DL (ref 8.5–10.1)
CALCIUM SERPL-MCNC: 9.1 MG/DL (ref 8.5–10.1)
CALCIUM SERPL-MCNC: 9.1 MG/DL (ref 8.5–10.1)
CALCIUM SERPL-MCNC: 9.2 MG/DL (ref 8.5–10.1)
CALCIUM SERPL-MCNC: 9.2 MG/DL (ref 8.5–10.1)
CALCIUM SERPL-MCNC: 9.4 MG/DL (ref 8.5–10.1)
CALCIUM SERPL-MCNC: 9.7 MG/DL (ref 8.5–10.1)
CALCIUM SERPL-MCNC: 9.9 MG/DL (ref 8.5–10.1)
CHLORIDE BLD-SCNC: 100 MMOL/L (ref 94–109)
CHLORIDE BLD-SCNC: 100 MMOL/L (ref 94–109)
CHLORIDE BLD-SCNC: 103 MMOL/L (ref 94–109)
CHLORIDE BLD-SCNC: 91 MMOL/L (ref 94–109)
CHLORIDE BLD-SCNC: 96 MMOL/L (ref 94–109)
CHLORIDE BLD-SCNC: 96 MMOL/L (ref 94–109)
CHLORIDE BLD-SCNC: 98 MMOL/L (ref 94–109)
CO2 SERPL-SCNC: 30 MMOL/L (ref 20–32)
CO2 SERPL-SCNC: 31 MMOL/L (ref 20–32)
CO2 SERPL-SCNC: 32 MMOL/L (ref 20–32)
CO2 SERPL-SCNC: 36 MMOL/L (ref 20–32)
CO2 SERPL-SCNC: 36 MMOL/L (ref 20–32)
CO2 SERPL-SCNC: 41 MMOL/L (ref 20–32)
COHGB MFR BLD: 98 % (ref 92–100)
CREAT SERPL-MCNC: 0.55 MG/DL (ref 0.66–1.25)
CREAT SERPL-MCNC: 0.63 MG/DL (ref 0.66–1.25)
CREAT SERPL-MCNC: 0.64 MG/DL (ref 0.66–1.25)
CREAT SERPL-MCNC: 0.66 MG/DL (ref 0.66–1.25)
CREAT SERPL-MCNC: 0.67 MG/DL (ref 0.66–1.25)
CREAT SERPL-MCNC: 0.68 MG/DL (ref 0.66–1.25)
CREAT SERPL-MCNC: 0.7 MG/DL (ref 0.66–1.25)
CREAT SERPL-MCNC: 0.8 MG/DL (ref 0.66–1.25)
CREAT SERPL-MCNC: 0.81 MG/DL (ref 0.66–1.25)
CRP SERPL-MCNC: 12 MG/L (ref 0–8)
CRP SERPL-MCNC: 17.6 MG/L (ref 0–8)
CRP SERPL-MCNC: 21.6 MG/L (ref 0–8)
CRP SERPL-MCNC: 29.9 MG/L (ref 0–8)
CRP SERPL-MCNC: 31.2 MG/L (ref 0–8)
D DIMER PPP FEU-MCNC: 0.3 UG/ML FEU (ref 0–0.5)
D DIMER PPP FEU-MCNC: 0.32 UG/ML FEU (ref 0–0.5)
D DIMER PPP FEU-MCNC: 0.62 UG/ML FEU (ref 0–0.5)
D DIMER PPP FEU-MCNC: <0.27 UG/ML FEU (ref 0–0.5)
DIASTOLIC BLOOD PRESSURE - MUSE: NORMAL MMHG
DIASTOLIC BLOOD PRESSURE - MUSE: NORMAL MMHG
DLCOUNC-%PRED-PRE: 44 %
DLCOUNC-PRE: 10.52 ML/MIN/MMHG
DLCOUNC-PRED: 23.6 ML/MIN/MMHG
EOSINOPHIL # BLD AUTO: 0.2 10E3/UL (ref 0–0.7)
EOSINOPHIL # BLD AUTO: 0.4 10E3/UL (ref 0–0.7)
EOSINOPHIL NFR BLD AUTO: 2 %
EOSINOPHIL NFR BLD AUTO: 4 %
ERV-%PRED-PRE: 28 %
ERV-PRE: 0.35 L
ERV-PRED: 1.22 L
ERYTHROCYTE [DISTWIDTH] IN BLOOD BY AUTOMATED COUNT: 12.5 % (ref 10–15)
ERYTHROCYTE [DISTWIDTH] IN BLOOD BY AUTOMATED COUNT: 12.6 % (ref 10–15)
ERYTHROCYTE [DISTWIDTH] IN BLOOD BY AUTOMATED COUNT: 12.7 % (ref 10–15)
ERYTHROCYTE [DISTWIDTH] IN BLOOD BY AUTOMATED COUNT: 13.1 % (ref 10–15)
ERYTHROCYTE [DISTWIDTH] IN BLOOD BY AUTOMATED COUNT: 13.2 % (ref 10–15)
ERYTHROCYTE [DISTWIDTH] IN BLOOD BY AUTOMATED COUNT: 13.4 % (ref 10–15)
ERYTHROCYTE [DISTWIDTH] IN BLOOD BY AUTOMATED COUNT: 13.6 % (ref 10–15)
ERYTHROCYTE [DISTWIDTH] IN BLOOD BY AUTOMATED COUNT: 14.4 % (ref 10–15)
EXPTIME-PRE: 5.57 SEC
FEF2575-%PRED-POST: 190 %
FEF2575-%PRED-PRE: 207 %
FEF2575-POST: 4.35 L/SEC
FEF2575-PRE: 4.72 L/SEC
FEF2575-PRED: 2.28 L/SEC
FEFMAX-%PRED-PRE: 78 %
FEFMAX-PRE: 6.04 L/SEC
FEFMAX-PRED: 7.7 L/SEC
FEV1-%PRED-PRE: 58 %
FEV1-PRE: 1.71 L
FEV1FEV6-PRE: 94 %
FEV1FEV6-PRED: 78 %
FEV1FVC-PRE: 94 %
FEV1FVC-PRED: 77 %
FEV1SVC-PRE: 115 %
FEV1SVC-PRED: 69 %
FIFMAX-PRE: 2.07 L/SEC
FLUAV RNA SPEC QL NAA+PROBE: NEGATIVE
FLUBV RNA RESP QL NAA+PROBE: NEGATIVE
FRCPLETH-%PRED-PRE: 70 %
FRCPLETH-PRE: 2.49 L
FRCPLETH-PRED: 3.52 L
FVC-%PRED-PRE: 47 %
FVC-PRE: 1.82 L
FVC-PRED: 3.82 L
GFR SERPL CREATININE-BSD FRML MDRD: >90 ML/MIN/1.73M2
GLUCOSE BLD-MCNC: 103 MG/DL (ref 70–99)
GLUCOSE BLD-MCNC: 104 MG/DL (ref 70–99)
GLUCOSE BLD-MCNC: 110 MG/DL (ref 70–99)
GLUCOSE BLD-MCNC: 115 MG/DL (ref 70–99)
GLUCOSE BLD-MCNC: 121 MG/DL (ref 70–99)
GLUCOSE BLD-MCNC: 134 MG/DL (ref 70–99)
GLUCOSE BLD-MCNC: 87 MG/DL (ref 70–99)
GLUCOSE BLD-MCNC: 90 MG/DL (ref 70–99)
GLUCOSE BLD-MCNC: 93 MG/DL (ref 70–99)
HCO3 BLDA-SCNC: 42 MMOL/L (ref 21–28)
HCO3 BLDV-SCNC: 35 MMOL/L (ref 21–28)
HCO3 BLDV-SCNC: 36 MMOL/L (ref 21–28)
HCO3 BLDV-SCNC: 42 MMOL/L (ref 21–28)
HCT VFR BLD AUTO: 36.2 % (ref 40–53)
HCT VFR BLD AUTO: 36.2 % (ref 40–53)
HCT VFR BLD AUTO: 37.9 % (ref 40–53)
HCT VFR BLD AUTO: 37.9 % (ref 40–53)
HCT VFR BLD AUTO: 39.1 % (ref 40–53)
HCT VFR BLD AUTO: 39.6 % (ref 40–53)
HCT VFR BLD AUTO: 40.1 % (ref 40–53)
HCT VFR BLD AUTO: 40.5 % (ref 40–53)
HCT VFR BLD AUTO: 41.6 % (ref 40–53)
HCT VFR BLD AUTO: 42.2 % (ref 40–53)
HGB BLD-MCNC: 11.4 G/DL (ref 13.3–17.7)
HGB BLD-MCNC: 11.4 G/DL (ref 13.3–17.7)
HGB BLD-MCNC: 11.7 G/DL (ref 13.3–17.7)
HGB BLD-MCNC: 12 G/DL (ref 13.3–17.7)
HGB BLD-MCNC: 12 G/DL (ref 13.3–17.7)
HGB BLD-MCNC: 12.1 G/DL (ref 13.3–17.7)
HGB BLD-MCNC: 12.6 G/DL (ref 13.3–17.7)
HGB BLD-MCNC: 12.9 G/DL (ref 13.3–17.7)
HGB BLD-MCNC: 13.3 G/DL (ref 13.3–17.7)
HGB BLD-MCNC: 13.4 G/DL (ref 13.3–17.7)
HOLD SPECIMEN: NORMAL
IC-%PRED-PRE: 37 %
IC-PRE: 1.13 L
IC-PRED: 3.02 L
IMM GRANULOCYTES # BLD: 0 10E3/UL
IMM GRANULOCYTES NFR BLD: 0 %
INTERPRETATION ECG - MUSE: NORMAL
INTERPRETATION ECG - MUSE: NORMAL
LACTATE BLD-SCNC: <0.3 MMOL/L
LACTATE BLD-SCNC: <0.3 MMOL/L
LACTATE SERPL-SCNC: 0.7 MMOL/L (ref 0.7–2)
LACTATE SERPL-SCNC: 0.9 MMOL/L (ref 0.7–2)
LVEF ECHO: NORMAL
LYMPHOCYTES # BLD AUTO: 0.5 10E3/UL (ref 0.8–5.3)
LYMPHOCYTES # BLD AUTO: 2.4 10E3/UL (ref 0.8–5.3)
LYMPHOCYTES NFR BLD AUTO: 20 %
LYMPHOCYTES NFR BLD AUTO: 5 %
MAGNESIUM SERPL-MCNC: 1.9 MG/DL (ref 1.6–2.3)
MAGNESIUM SERPL-MCNC: 2.1 MG/DL (ref 1.6–2.3)
MAGNESIUM SERPL-MCNC: 2.1 MG/DL (ref 1.6–2.3)
MAGNESIUM SERPL-MCNC: 2.2 MG/DL (ref 1.6–2.3)
MCH RBC QN AUTO: 28.6 PG (ref 26.5–33)
MCH RBC QN AUTO: 29.5 PG (ref 26.5–33)
MCH RBC QN AUTO: 29.5 PG (ref 26.5–33)
MCH RBC QN AUTO: 29.6 PG (ref 26.5–33)
MCH RBC QN AUTO: 29.7 PG (ref 26.5–33)
MCH RBC QN AUTO: 29.8 PG (ref 26.5–33)
MCH RBC QN AUTO: 29.9 PG (ref 26.5–33)
MCH RBC QN AUTO: 30 PG (ref 26.5–33)
MCH RBC QN AUTO: 30.1 PG (ref 26.5–33)
MCH RBC QN AUTO: 30.1 PG (ref 26.5–33)
MCHC RBC AUTO-ENTMCNC: 30.3 G/DL (ref 31.5–36.5)
MCHC RBC AUTO-ENTMCNC: 30.7 G/DL (ref 31.5–36.5)
MCHC RBC AUTO-ENTMCNC: 30.9 G/DL (ref 31.5–36.5)
MCHC RBC AUTO-ENTMCNC: 31.1 G/DL (ref 31.5–36.5)
MCHC RBC AUTO-ENTMCNC: 31.5 G/DL (ref 31.5–36.5)
MCHC RBC AUTO-ENTMCNC: 31.9 G/DL (ref 31.5–36.5)
MCHC RBC AUTO-ENTMCNC: 32.2 G/DL (ref 31.5–36.5)
MCHC RBC AUTO-ENTMCNC: 32.2 G/DL (ref 31.5–36.5)
MCV RBC AUTO: 92 FL (ref 78–100)
MCV RBC AUTO: 93 FL (ref 78–100)
MCV RBC AUTO: 94 FL (ref 78–100)
MCV RBC AUTO: 95 FL (ref 78–100)
MCV RBC AUTO: 96 FL (ref 78–100)
MCV RBC AUTO: 96 FL (ref 78–100)
MCV RBC AUTO: 97 FL (ref 78–100)
MONOCYTES # BLD AUTO: 0.9 10E3/UL (ref 0–1.3)
MONOCYTES # BLD AUTO: 1 10E3/UL (ref 0–1.3)
MONOCYTES NFR BLD AUTO: 8 %
MONOCYTES NFR BLD AUTO: 9 %
NEUTROPHILS # BLD AUTO: 8.2 10E3/UL (ref 1.6–8.3)
NEUTROPHILS # BLD AUTO: 8.3 10E3/UL (ref 1.6–8.3)
NEUTROPHILS NFR BLD AUTO: 69 %
NEUTROPHILS NFR BLD AUTO: 82 %
NRBC # BLD AUTO: 0 10E3/UL
NRBC BLD AUTO-RTO: 0 /100
NT-PROBNP SERPL-MCNC: 4369 PG/ML (ref 0–900)
NT-PROBNP SERPL-MCNC: 834 PG/ML (ref 0–900)
O2/TOTAL GAS SETTING VFR VENT: 100 %
O2/TOTAL GAS SETTING VFR VENT: 3 %
OXYHGB MFR BLDV: 69 % (ref 70–75)
P AXIS - MUSE: 28 DEGREES
P AXIS - MUSE: 28 DEGREES
PATH REPORT.COMMENTS IMP SPEC: NORMAL
PATH REPORT.FINAL DX SPEC: NORMAL
PATH REPORT.GROSS SPEC: NORMAL
PATH REPORT.MICROSCOPIC SPEC OTHER STN: NORMAL
PATH REPORT.RELEVANT HX SPEC: NORMAL
PATH REPORT.RELEVANT HX SPEC: NORMAL
PATH REPORT.SITE OF ORIGIN SPEC: NORMAL
PCO2 BLDA: 70 MM HG (ref 35–45)
PCO2 BLDV: 56 MM HG (ref 40–50)
PCO2 BLDV: 57 MM HG (ref 40–50)
PCO2 BLDV: 75 MM HG (ref 40–50)
PH BLDA: 7.38 [PH] (ref 7.35–7.45)
PH BLDV: 7.36 [PH] (ref 7.32–7.43)
PH BLDV: 7.4 [PH] (ref 7.32–7.43)
PH BLDV: 7.41 [PH] (ref 7.32–7.43)
PLATELET # BLD AUTO: 272 10E3/UL (ref 150–450)
PLATELET # BLD AUTO: 281 10E3/UL (ref 150–450)
PLATELET # BLD AUTO: 283 10E3/UL (ref 150–450)
PLATELET # BLD AUTO: 302 10E3/UL (ref 150–450)
PLATELET # BLD AUTO: 336 10E3/UL (ref 150–450)
PLATELET # BLD AUTO: 414 10E3/UL (ref 150–450)
PLATELET # BLD AUTO: 418 10E3/UL (ref 150–450)
PLATELET # BLD AUTO: 438 10E3/UL (ref 150–450)
PLATELET # BLD AUTO: 458 10E3/UL (ref 150–450)
PLATELET # BLD AUTO: 515 10E3/UL (ref 150–450)
PO2 BLDA: 119 MM HG (ref 80–105)
PO2 BLDV: 39 MM HG (ref 25–47)
PO2 BLDV: 42 MM HG (ref 25–47)
PO2 BLDV: 49 MM HG (ref 25–47)
POTASSIUM BLD-SCNC: 3.3 MMOL/L (ref 3.4–5.3)
POTASSIUM BLD-SCNC: 3.4 MMOL/L (ref 3.4–5.3)
POTASSIUM BLD-SCNC: 3.4 MMOL/L (ref 3.4–5.3)
POTASSIUM BLD-SCNC: 3.5 MMOL/L (ref 3.4–5.3)
POTASSIUM BLD-SCNC: 3.5 MMOL/L (ref 3.4–5.3)
POTASSIUM BLD-SCNC: 3.6 MMOL/L (ref 3.4–5.3)
POTASSIUM BLD-SCNC: 3.6 MMOL/L (ref 3.4–5.3)
POTASSIUM BLD-SCNC: 3.7 MMOL/L (ref 3.4–5.3)
POTASSIUM BLD-SCNC: 3.7 MMOL/L (ref 3.4–5.3)
POTASSIUM BLD-SCNC: 4.1 MMOL/L (ref 3.4–5.3)
POTASSIUM BLD-SCNC: 4.1 MMOL/L (ref 3.4–5.3)
POTASSIUM BLD-SCNC: 4.3 MMOL/L (ref 3.4–5.3)
PR INTERVAL - MUSE: 128 MS
PR INTERVAL - MUSE: 134 MS
PROCALCITONIN SERPL-MCNC: 0.06 NG/ML
PROCALCITONIN SERPL-MCNC: <0.05 NG/ML
PROT SERPL-MCNC: 6.8 G/DL (ref 6.8–8.8)
PROT SERPL-MCNC: 6.9 G/DL (ref 6.8–8.8)
PROT SERPL-MCNC: 7.3 G/DL (ref 6.8–8.8)
PROT SERPL-MCNC: 7.8 G/DL (ref 6.8–8.8)
QRS DURATION - MUSE: 74 MS
QRS DURATION - MUSE: 90 MS
QT - MUSE: 326 MS
QT - MUSE: 404 MS
QTC - MUSE: 427 MS
QTC - MUSE: 465 MS
R AXIS - MUSE: 16 DEGREES
R AXIS - MUSE: 7 DEGREES
RBC # BLD AUTO: 3.8 10E6/UL (ref 4.4–5.9)
RBC # BLD AUTO: 3.81 10E6/UL (ref 4.4–5.9)
RBC # BLD AUTO: 3.92 10E6/UL (ref 4.4–5.9)
RBC # BLD AUTO: 4.07 10E6/UL (ref 4.4–5.9)
RBC # BLD AUTO: 4.07 10E6/UL (ref 4.4–5.9)
RBC # BLD AUTO: 4.19 10E6/UL (ref 4.4–5.9)
RBC # BLD AUTO: 4.26 10E6/UL (ref 4.4–5.9)
RBC # BLD AUTO: 4.29 10E6/UL (ref 4.4–5.9)
RBC # BLD AUTO: 4.42 10E6/UL (ref 4.4–5.9)
RBC # BLD AUTO: 4.54 10E6/UL (ref 4.4–5.9)
RVPLETH-%PRED-PRE: 84 %
RVPLETH-PRE: 2.14 L
RVPLETH-PRED: 2.54 L
SAO2 % BLDV: 72 % (ref 94–100)
SARS-COV-2 RNA RESP QL NAA+PROBE: NEGATIVE
SARS-COV-2 RNA RESP QL NAA+PROBE: POSITIVE
SODIUM SERPL-SCNC: 132 MMOL/L (ref 133–144)
SODIUM SERPL-SCNC: 133 MMOL/L (ref 133–144)
SODIUM SERPL-SCNC: 134 MMOL/L (ref 133–144)
SODIUM SERPL-SCNC: 136 MMOL/L (ref 133–144)
SODIUM SERPL-SCNC: 136 MMOL/L (ref 133–144)
SODIUM SERPL-SCNC: 138 MMOL/L (ref 133–144)
SODIUM SERPL-SCNC: 140 MMOL/L (ref 133–144)
SYSTOLIC BLOOD PRESSURE - MUSE: NORMAL MMHG
SYSTOLIC BLOOD PRESSURE - MUSE: NORMAL MMHG
T AXIS - MUSE: -32 DEGREES
T AXIS - MUSE: -71 DEGREES
TLCPLETH-%PRED-PRE: 55 %
TLCPLETH-PRE: 3.62 L
TLCPLETH-PRED: 6.52 L
TROPONIN I SERPL HS-MCNC: 1048 NG/L
TROPONIN I SERPL HS-MCNC: 1053 NG/L
TROPONIN I SERPL HS-MCNC: 1228 NG/L
TROPONIN I SERPL HS-MCNC: 1413 NG/L
TROPONIN I SERPL HS-MCNC: 16 NG/L
TROPONIN I SERPL HS-MCNC: 25 NG/L
TROPONIN I SERPL HS-MCNC: 26 NG/L
TSH SERPL DL<=0.005 MIU/L-ACNC: 2.86 MU/L (ref 0.4–4)
UFH PPP CHRO-ACNC: 0.1 IU/ML
UFH PPP CHRO-ACNC: 0.15 IU/ML
UFH PPP CHRO-ACNC: 0.17 IU/ML
UFH PPP CHRO-ACNC: 0.19 IU/ML
UFH PPP CHRO-ACNC: 0.27 IU/ML
UFH PPP CHRO-ACNC: 0.3 IU/ML
UFH PPP CHRO-ACNC: <0.1 IU/ML
UFH PPP CHRO-ACNC: <0.1 IU/ML
VA-%PRED-PRE: 51 %
VA-PRE: 2.97 L
VC-%PRED-PRE: 35 %
VC-PRE: 1.48 L
VC-PRED: 4.24 L
VENTRICULAR RATE- MUSE: 103 BPM
VENTRICULAR RATE- MUSE: 80 BPM
WBC # BLD AUTO: 10.2 10E3/UL (ref 4–11)
WBC # BLD AUTO: 10.9 10E3/UL (ref 4–11)
WBC # BLD AUTO: 11.4 10E3/UL (ref 4–11)
WBC # BLD AUTO: 11.9 10E3/UL (ref 4–11)
WBC # BLD AUTO: 12.6 10E3/UL (ref 4–11)
WBC # BLD AUTO: 13.8 10E3/UL (ref 4–11)
WBC # BLD AUTO: 7.2 10E3/UL (ref 4–11)
WBC # BLD AUTO: 7.4 10E3/UL (ref 4–11)
WBC # BLD AUTO: 7.5 10E3/UL (ref 4–11)
WBC # BLD AUTO: 9 10E3/UL (ref 4–11)

## 2022-01-01 PROCEDURE — 84484 ASSAY OF TROPONIN QUANT: CPT | Performed by: EMERGENCY MEDICINE

## 2022-01-01 PROCEDURE — 85014 HEMATOCRIT: CPT | Performed by: HOSPITALIST

## 2022-01-01 PROCEDURE — 250N000011 HC RX IP 250 OP 636: Performed by: HOSPITALIST

## 2022-01-01 PROCEDURE — 85520 HEPARIN ASSAY: CPT | Performed by: HOSPITALIST

## 2022-01-01 PROCEDURE — 99239 HOSP IP/OBS DSCHRG MGMT >30: CPT | Performed by: INTERNAL MEDICINE

## 2022-01-01 PROCEDURE — 258N000003 HC RX IP 258 OP 636: Performed by: INTERNAL MEDICINE

## 2022-01-01 PROCEDURE — 85379 FIBRIN DEGRADATION QUANT: CPT | Performed by: HOSPITALIST

## 2022-01-01 PROCEDURE — 85018 HEMOGLOBIN: CPT | Performed by: HOSPITALIST

## 2022-01-01 PROCEDURE — 250N000013 HC RX MED GY IP 250 OP 250 PS 637: Performed by: HOSPITALIST

## 2022-01-01 PROCEDURE — 99152 MOD SED SAME PHYS/QHP 5/>YRS: CPT | Performed by: INTERNAL MEDICINE

## 2022-01-01 PROCEDURE — 999N000208 ECHOCARDIOGRAM COMPLETE

## 2022-01-01 PROCEDURE — 210N000002 HC R&B HEART CARE

## 2022-01-01 PROCEDURE — 93306 TTE W/DOPPLER COMPLETE: CPT | Mod: 26 | Performed by: INTERNAL MEDICINE

## 2022-01-01 PROCEDURE — 250N000009 HC RX 250: Performed by: EMERGENCY MEDICINE

## 2022-01-01 PROCEDURE — 85520 HEPARIN ASSAY: CPT | Performed by: INTERNAL MEDICINE

## 2022-01-01 PROCEDURE — 86140 C-REACTIVE PROTEIN: CPT | Performed by: HOSPITALIST

## 2022-01-01 PROCEDURE — G1004 CDSM NDSC: HCPCS

## 2022-01-01 PROCEDURE — 99223 1ST HOSP IP/OBS HIGH 75: CPT | Mod: AI | Performed by: HOSPITALIST

## 2022-01-01 PROCEDURE — 94726 PLETHYSMOGRAPHY LUNG VOLUMES: CPT | Performed by: INTERNAL MEDICINE

## 2022-01-01 PROCEDURE — 99232 SBSQ HOSP IP/OBS MODERATE 35: CPT | Performed by: INTERNAL MEDICINE

## 2022-01-01 PROCEDURE — 36415 COLL VENOUS BLD VENIPUNCTURE: CPT | Performed by: HOSPITALIST

## 2022-01-01 PROCEDURE — 87636 SARSCOV2 & INF A&B AMP PRB: CPT | Performed by: EMERGENCY MEDICINE

## 2022-01-01 PROCEDURE — 250N000011 HC RX IP 250 OP 636: Performed by: EMERGENCY MEDICINE

## 2022-01-01 PROCEDURE — 99207 PR MOONLIGHTING INDICATOR: CPT | Performed by: HOSPITALIST

## 2022-01-01 PROCEDURE — 36415 COLL VENOUS BLD VENIPUNCTURE: CPT | Performed by: EMERGENCY MEDICINE

## 2022-01-01 PROCEDURE — 99285 EMERGENCY DEPT VISIT HI MDM: CPT | Mod: 25

## 2022-01-01 PROCEDURE — 84484 ASSAY OF TROPONIN QUANT: CPT | Performed by: HOSPITALIST

## 2022-01-01 PROCEDURE — 250N000009 HC RX 250: Performed by: HOSPITALIST

## 2022-01-01 PROCEDURE — 85027 COMPLETE CBC AUTOMATED: CPT | Performed by: HOSPITALIST

## 2022-01-01 PROCEDURE — 99205 OFFICE O/P NEW HI 60 MIN: CPT | Performed by: INTERNAL MEDICINE

## 2022-01-01 PROCEDURE — 99153 MOD SED SAME PHYS/QHP EA: CPT | Performed by: INTERNAL MEDICINE

## 2022-01-01 PROCEDURE — 120N000001 HC R&B MED SURG/OB

## 2022-01-01 PROCEDURE — 85027 COMPLETE CBC AUTOMATED: CPT | Performed by: EMERGENCY MEDICINE

## 2022-01-01 PROCEDURE — 80048 BASIC METABOLIC PNL TOTAL CA: CPT | Performed by: HOSPITALIST

## 2022-01-01 PROCEDURE — 80053 COMPREHEN METABOLIC PANEL: CPT | Performed by: HOSPITALIST

## 2022-01-01 PROCEDURE — 96376 TX/PRO/DX INJ SAME DRUG ADON: CPT

## 2022-01-01 PROCEDURE — 250N000009 HC RX 250: Performed by: INTERNAL MEDICINE

## 2022-01-01 PROCEDURE — C1769 GUIDE WIRE: HCPCS | Performed by: INTERNAL MEDICINE

## 2022-01-01 PROCEDURE — 82805 BLOOD GASES W/O2 SATURATION: CPT | Performed by: EMERGENCY MEDICINE

## 2022-01-01 PROCEDURE — 94729 DIFFUSING CAPACITY: CPT | Performed by: INTERNAL MEDICINE

## 2022-01-01 PROCEDURE — 99215 OFFICE O/P EST HI 40 MIN: CPT | Performed by: INTERNAL MEDICINE

## 2022-01-01 PROCEDURE — 80048 BASIC METABOLIC PNL TOTAL CA: CPT | Performed by: EMERGENCY MEDICINE

## 2022-01-01 PROCEDURE — C1887 CATHETER, GUIDING: HCPCS | Performed by: INTERNAL MEDICINE

## 2022-01-01 PROCEDURE — 93456 R HRT CORONARY ARTERY ANGIO: CPT | Performed by: INTERNAL MEDICINE

## 2022-01-01 PROCEDURE — 83605 ASSAY OF LACTIC ACID: CPT | Performed by: EMERGENCY MEDICINE

## 2022-01-01 PROCEDURE — 93460 R&L HRT ART/VENTRICLE ANGIO: CPT | Performed by: INTERNAL MEDICINE

## 2022-01-01 PROCEDURE — XW033E5 INTRODUCTION OF REMDESIVIR ANTI-INFECTIVE INTO PERIPHERAL VEIN, PERCUTANEOUS APPROACH, NEW TECHNOLOGY GROUP 5: ICD-10-PCS | Performed by: HOSPITALIST

## 2022-01-01 PROCEDURE — 272N000001 HC OR GENERAL SUPPLY STERILE: Performed by: INTERNAL MEDICINE

## 2022-01-01 PROCEDURE — 96365 THER/PROPH/DIAG IV INF INIT: CPT

## 2022-01-01 PROCEDURE — 99232 SBSQ HOSP IP/OBS MODERATE 35: CPT | Performed by: HOSPITALIST

## 2022-01-01 PROCEDURE — 82803 BLOOD GASES ANY COMBINATION: CPT | Performed by: EMERGENCY MEDICINE

## 2022-01-01 PROCEDURE — 99233 SBSQ HOSP IP/OBS HIGH 50: CPT | Mod: 25 | Performed by: INTERNAL MEDICINE

## 2022-01-01 PROCEDURE — 94060 EVALUATION OF WHEEZING: CPT | Performed by: INTERNAL MEDICINE

## 2022-01-01 PROCEDURE — 84132 ASSAY OF SERUM POTASSIUM: CPT | Performed by: INTERNAL MEDICINE

## 2022-01-01 PROCEDURE — 83735 ASSAY OF MAGNESIUM: CPT | Performed by: INTERNAL MEDICINE

## 2022-01-01 PROCEDURE — 96375 TX/PRO/DX INJ NEW DRUG ADDON: CPT

## 2022-01-01 PROCEDURE — C1894 INTRO/SHEATH, NON-LASER: HCPCS | Performed by: INTERNAL MEDICINE

## 2022-01-01 PROCEDURE — 250N000013 HC RX MED GY IP 250 OP 250 PS 637: Performed by: EMERGENCY MEDICINE

## 2022-01-01 PROCEDURE — 82803 BLOOD GASES ANY COMBINATION: CPT

## 2022-01-01 PROCEDURE — 88321 CONSLTJ&REPRT SLD PREP ELSWR: CPT | Performed by: PATHOLOGY

## 2022-01-01 PROCEDURE — 258N000003 HC RX IP 258 OP 636: Performed by: HOSPITALIST

## 2022-01-01 PROCEDURE — 258N000003 HC RX IP 258 OP 636: Performed by: EMERGENCY MEDICINE

## 2022-01-01 PROCEDURE — 82310 ASSAY OF CALCIUM: CPT | Performed by: HOSPITALIST

## 2022-01-01 PROCEDURE — C9803 HOPD COVID-19 SPEC COLLECT: HCPCS

## 2022-01-01 PROCEDURE — 93456 R HRT CORONARY ARTERY ANGIO: CPT | Mod: 26 | Performed by: INTERNAL MEDICINE

## 2022-01-01 PROCEDURE — 83735 ASSAY OF MAGNESIUM: CPT | Performed by: HOSPITALIST

## 2022-01-01 PROCEDURE — 93005 ELECTROCARDIOGRAM TRACING: CPT

## 2022-01-01 PROCEDURE — G1004 CDSM NDSC: HCPCS | Performed by: RADIOLOGY

## 2022-01-01 PROCEDURE — 84484 ASSAY OF TROPONIN QUANT: CPT | Mod: 91 | Performed by: HOSPITALIST

## 2022-01-01 PROCEDURE — 250N000013 HC RX MED GY IP 250 OP 250 PS 637: Performed by: INTERNAL MEDICINE

## 2022-01-01 PROCEDURE — 71275 CT ANGIOGRAPHY CHEST: CPT

## 2022-01-01 PROCEDURE — 250N000012 HC RX MED GY IP 250 OP 636 PS 637: Performed by: INTERNAL MEDICINE

## 2022-01-01 PROCEDURE — B2111ZZ FLUOROSCOPY OF MULTIPLE CORONARY ARTERIES USING LOW OSMOLAR CONTRAST: ICD-10-PCS | Performed by: INTERNAL MEDICINE

## 2022-01-01 PROCEDURE — 84443 ASSAY THYROID STIM HORMONE: CPT | Performed by: EMERGENCY MEDICINE

## 2022-01-01 PROCEDURE — 83880 ASSAY OF NATRIURETIC PEPTIDE: CPT | Performed by: EMERGENCY MEDICINE

## 2022-01-01 PROCEDURE — 94618 PULMONARY STRESS TESTING: CPT | Performed by: INTERNAL MEDICINE

## 2022-01-01 PROCEDURE — 84145 PROCALCITONIN (PCT): CPT | Performed by: HOSPITALIST

## 2022-01-01 PROCEDURE — 85379 FIBRIN DEGRADATION QUANT: CPT | Performed by: EMERGENCY MEDICINE

## 2022-01-01 PROCEDURE — 80053 COMPREHEN METABOLIC PANEL: CPT | Performed by: EMERGENCY MEDICINE

## 2022-01-01 PROCEDURE — 255N000002 HC RX 255 OP 636: Performed by: HOSPITALIST

## 2022-01-01 PROCEDURE — U0003 INFECTIOUS AGENT DETECTION BY NUCLEIC ACID (DNA OR RNA); SEVERE ACUTE RESPIRATORY SYNDROME CORONAVIRUS 2 (SARS-COV-2) (CORONAVIRUS DISEASE [COVID-19]), AMPLIFIED PROBE TECHNIQUE, MAKING USE OF HIGH THROUGHPUT TECHNOLOGIES AS DESCRIBED BY CMS-2020-01-R: HCPCS | Performed by: EMERGENCY MEDICINE

## 2022-01-01 PROCEDURE — 85025 COMPLETE CBC W/AUTO DIFF WBC: CPT | Performed by: EMERGENCY MEDICINE

## 2022-01-01 PROCEDURE — 36415 COLL VENOUS BLD VENIPUNCTURE: CPT

## 2022-01-01 PROCEDURE — 82040 ASSAY OF SERUM ALBUMIN: CPT | Performed by: EMERGENCY MEDICINE

## 2022-01-01 PROCEDURE — 99214 OFFICE O/P EST MOD 30 MIN: CPT | Mod: 95 | Performed by: INTERNAL MEDICINE

## 2022-01-01 PROCEDURE — 71250 CT THORAX DX C-: CPT | Mod: MG | Performed by: RADIOLOGY

## 2022-01-01 PROCEDURE — 4A023N6 MEASUREMENT OF CARDIAC SAMPLING AND PRESSURE, RIGHT HEART, PERCUTANEOUS APPROACH: ICD-10-PCS | Performed by: INTERNAL MEDICINE

## 2022-01-01 PROCEDURE — 99207 PR CDG-CHARGE REQUIRED MANUAL ENTRY: CPT | Performed by: HOSPITALIST

## 2022-01-01 PROCEDURE — 85025 COMPLETE CBC W/AUTO DIFF WBC: CPT

## 2022-01-01 PROCEDURE — 85041 AUTOMATED RBC COUNT: CPT | Performed by: HOSPITALIST

## 2022-01-01 PROCEDURE — 99223 1ST HOSP IP/OBS HIGH 75: CPT | Mod: 25 | Performed by: INTERNAL MEDICINE

## 2022-01-01 PROCEDURE — 36415 COLL VENOUS BLD VENIPUNCTURE: CPT | Performed by: INTERNAL MEDICINE

## 2022-01-01 PROCEDURE — 250N000011 HC RX IP 250 OP 636: Performed by: INTERNAL MEDICINE

## 2022-01-01 PROCEDURE — 84145 PROCALCITONIN (PCT): CPT

## 2022-01-01 PROCEDURE — 84132 ASSAY OF SERUM POTASSIUM: CPT | Performed by: HOSPITALIST

## 2022-01-01 RX ORDER — LORAZEPAM 1 MG/1
.5-1 TABLET ORAL EVERY 8 HOURS PRN
Qty: 3 TABLET | Refills: 0 | Status: SHIPPED | OUTPATIENT
Start: 2022-01-01

## 2022-01-01 RX ORDER — ALBUTEROL SULFATE 90 UG/1
2 AEROSOL, METERED RESPIRATORY (INHALATION) EVERY 4 HOURS PRN
COMMUNITY
Start: 2021-02-24 | End: 2022-01-01

## 2022-01-01 RX ORDER — LORAZEPAM 0.5 MG/1
.5-1 TABLET ORAL EVERY 4 HOURS PRN
Status: DISCONTINUED | OUTPATIENT
Start: 2022-01-01 | End: 2022-01-01 | Stop reason: HOSPADM

## 2022-01-01 RX ORDER — IOPAMIDOL 755 MG/ML
58 INJECTION, SOLUTION INTRAVASCULAR ONCE
Status: COMPLETED | OUTPATIENT
Start: 2022-01-01 | End: 2022-01-01

## 2022-01-01 RX ORDER — VITAMIN B COMPLEX
1 TABLET ORAL DAILY
COMMUNITY

## 2022-01-01 RX ORDER — POLYETHYLENE GLYCOL 3350 17 G/17G
17 POWDER, FOR SOLUTION ORAL DAILY
Status: DISCONTINUED | OUTPATIENT
Start: 2022-01-01 | End: 2022-01-01 | Stop reason: HOSPADM

## 2022-01-01 RX ORDER — ASPIRIN 81 MG/1
243 TABLET, CHEWABLE ORAL ONCE
Status: COMPLETED | OUTPATIENT
Start: 2022-01-01 | End: 2022-01-01

## 2022-01-01 RX ORDER — POTASSIUM CHLORIDE 1500 MG/1
20 TABLET, EXTENDED RELEASE ORAL
Status: COMPLETED | OUTPATIENT
Start: 2022-01-01 | End: 2022-01-01

## 2022-01-01 RX ORDER — LORAZEPAM 2 MG/ML
1.5 INJECTION INTRAMUSCULAR ONCE
Status: COMPLETED | OUTPATIENT
Start: 2022-01-01 | End: 2022-01-01

## 2022-01-01 RX ORDER — BISACODYL 10 MG
10 SUPPOSITORY, RECTAL RECTAL DAILY PRN
Status: DISCONTINUED | OUTPATIENT
Start: 2022-01-01 | End: 2022-01-01 | Stop reason: HOSPADM

## 2022-01-01 RX ORDER — FUROSEMIDE 10 MG/ML
40 INJECTION INTRAMUSCULAR; INTRAVENOUS ONCE
Status: COMPLETED | OUTPATIENT
Start: 2022-01-01 | End: 2022-01-01

## 2022-01-01 RX ORDER — ETHYL CHLORIDE 100 %
1 AEROSOL, SPRAY (ML) TOPICAL ONCE
Status: COMPLETED | OUTPATIENT
Start: 2022-01-01 | End: 2022-01-01

## 2022-01-01 RX ORDER — NALOXONE HYDROCHLORIDE 0.4 MG/ML
0.4 INJECTION, SOLUTION INTRAMUSCULAR; INTRAVENOUS; SUBCUTANEOUS
Status: ACTIVE | OUTPATIENT
Start: 2022-01-01 | End: 2022-01-01

## 2022-01-01 RX ORDER — ZINC SULFATE 50(220)MG
220 CAPSULE ORAL DAILY
Status: DISCONTINUED | OUTPATIENT
Start: 2022-01-01 | End: 2022-01-01 | Stop reason: HOSPADM

## 2022-01-01 RX ORDER — FENTANYL CITRATE 50 UG/ML
25 INJECTION, SOLUTION INTRAMUSCULAR; INTRAVENOUS
Status: DISCONTINUED | OUTPATIENT
Start: 2022-01-01 | End: 2022-01-01 | Stop reason: HOSPADM

## 2022-01-01 RX ORDER — ALBUTEROL SULFATE 90 UG/1
2 AEROSOL, METERED RESPIRATORY (INHALATION) EVERY 6 HOURS
Qty: 54 G | Refills: 3 | Status: SHIPPED | OUTPATIENT
Start: 2022-01-01

## 2022-01-01 RX ORDER — ONDANSETRON 4 MG/1
4 TABLET, ORALLY DISINTEGRATING ORAL EVERY 6 HOURS PRN
Status: DISCONTINUED | OUTPATIENT
Start: 2022-01-01 | End: 2022-01-01 | Stop reason: HOSPADM

## 2022-01-01 RX ORDER — ASPIRIN 325 MG
325 TABLET ORAL ONCE
Status: COMPLETED | OUTPATIENT
Start: 2022-01-01 | End: 2022-01-01

## 2022-01-01 RX ORDER — METHYLPHENIDATE HYDROCHLORIDE EXTENDED RELEASE 10 MG/1
20 TABLET ORAL 4 TIMES DAILY
Status: DISCONTINUED | OUTPATIENT
Start: 2022-01-01 | End: 2022-01-01 | Stop reason: HOSPADM

## 2022-01-01 RX ORDER — LORAZEPAM 2 MG/ML
0.5 INJECTION INTRAMUSCULAR
Status: DISCONTINUED | OUTPATIENT
Start: 2022-01-01 | End: 2022-01-01 | Stop reason: HOSPADM

## 2022-01-01 RX ORDER — ONDANSETRON 2 MG/ML
4 INJECTION INTRAMUSCULAR; INTRAVENOUS EVERY 6 HOURS PRN
Status: DISCONTINUED | OUTPATIENT
Start: 2022-01-01 | End: 2022-01-01 | Stop reason: HOSPADM

## 2022-01-01 RX ORDER — PREDNISONE 10 MG/1
TABLET ORAL
Qty: 89 TABLET | Refills: 0 | Status: SHIPPED | OUTPATIENT
Start: 2022-01-01 | End: 2022-01-01

## 2022-01-01 RX ORDER — DEXAMETHASONE SODIUM PHOSPHATE 4 MG/ML
6 INJECTION, SOLUTION INTRA-ARTICULAR; INTRALESIONAL; INTRAMUSCULAR; INTRAVENOUS; SOFT TISSUE ONCE
Status: COMPLETED | OUTPATIENT
Start: 2022-01-01 | End: 2022-01-01

## 2022-01-01 RX ORDER — PRENATAL VIT/IRON FUM/FOLIC AC 27MG-0.8MG
1 TABLET ORAL DAILY
Status: DISCONTINUED | OUTPATIENT
Start: 2022-01-01 | End: 2022-01-01 | Stop reason: HOSPADM

## 2022-01-01 RX ORDER — ALBUTEROL SULFATE 0.83 MG/ML
2.5 SOLUTION RESPIRATORY (INHALATION) EVERY 4 HOURS PRN
Qty: 90 ML | Refills: 0 | Status: SHIPPED | OUTPATIENT
Start: 2022-01-01

## 2022-01-01 RX ORDER — POTASSIUM CHLORIDE 1500 MG/1
20 TABLET, EXTENDED RELEASE ORAL ONCE
Status: COMPLETED | OUTPATIENT
Start: 2022-01-01 | End: 2022-01-01

## 2022-01-01 RX ORDER — PROCHLORPERAZINE MALEATE 5 MG
5 TABLET ORAL EVERY 6 HOURS PRN
Status: DISCONTINUED | OUTPATIENT
Start: 2022-01-01 | End: 2022-01-01 | Stop reason: HOSPADM

## 2022-01-01 RX ORDER — SODIUM CHLORIDE 9 MG/ML
75 INJECTION, SOLUTION INTRAVENOUS CONTINUOUS
Status: ACTIVE | OUTPATIENT
Start: 2022-01-01 | End: 2022-01-01

## 2022-01-01 RX ORDER — NALOXONE HYDROCHLORIDE 0.4 MG/ML
0.2 INJECTION, SOLUTION INTRAMUSCULAR; INTRAVENOUS; SUBCUTANEOUS
Status: ACTIVE | OUTPATIENT
Start: 2022-01-01 | End: 2022-01-01

## 2022-01-01 RX ORDER — ACETAMINOPHEN 650 MG/1
650 SUPPOSITORY RECTAL EVERY 6 HOURS PRN
Status: DISCONTINUED | OUTPATIENT
Start: 2022-01-01 | End: 2022-01-01 | Stop reason: HOSPADM

## 2022-01-01 RX ORDER — ACETAMINOPHEN 325 MG/1
650 TABLET ORAL EVERY 4 HOURS PRN
Status: DISCONTINUED | OUTPATIENT
Start: 2022-01-01 | End: 2022-01-01 | Stop reason: HOSPADM

## 2022-01-01 RX ORDER — LORAZEPAM 0.5 MG/1
0.5 TABLET ORAL
Status: DISCONTINUED | OUTPATIENT
Start: 2022-01-01 | End: 2022-01-01 | Stop reason: HOSPADM

## 2022-01-01 RX ORDER — MULTIVIT-MIN/IRON/FOLIC ACID/K 18-600-40
2000 CAPSULE ORAL DAILY
COMMUNITY

## 2022-01-01 RX ORDER — PREDNISONE 20 MG/1
40 TABLET ORAL DAILY
Status: DISCONTINUED | OUTPATIENT
Start: 2022-01-01 | End: 2022-01-01 | Stop reason: HOSPADM

## 2022-01-01 RX ORDER — HEPARIN SODIUM 10000 [USP'U]/100ML
0-5000 INJECTION, SOLUTION INTRAVENOUS CONTINUOUS
Status: DISCONTINUED | OUTPATIENT
Start: 2022-01-01 | End: 2022-01-01

## 2022-01-01 RX ORDER — ALBUTEROL SULFATE 0.83 MG/ML
2.5 SOLUTION RESPIRATORY (INHALATION) EVERY 4 HOURS PRN
Qty: 90 ML | Refills: 0 | Status: SHIPPED | OUTPATIENT
Start: 2022-01-01 | End: 2022-01-01

## 2022-01-01 RX ORDER — ALBUTEROL SULFATE 90 UG/1
2 AEROSOL, METERED RESPIRATORY (INHALATION) EVERY 6 HOURS
COMMUNITY
End: 2022-01-01

## 2022-01-01 RX ORDER — IRON/FOLIC AC/VIT BCOMP,C/MIN 106 MG-1MG
1 TABLET ORAL DAILY
COMMUNITY

## 2022-01-01 RX ORDER — FENTANYL CITRATE 50 UG/ML
INJECTION, SOLUTION INTRAMUSCULAR; INTRAVENOUS
Status: DISCONTINUED | OUTPATIENT
Start: 2022-01-01 | End: 2022-01-01 | Stop reason: HOSPADM

## 2022-01-01 RX ORDER — FUROSEMIDE 10 MG/ML
40 INJECTION INTRAMUSCULAR; INTRAVENOUS 2 TIMES DAILY
Status: DISCONTINUED | OUTPATIENT
Start: 2022-01-01 | End: 2022-01-01

## 2022-01-01 RX ORDER — DEXAMETHASONE 6 MG/1
6 TABLET ORAL DAILY
Qty: 4 TABLET | Refills: 0 | Status: SHIPPED | OUTPATIENT
Start: 2022-01-01 | End: 2022-01-01

## 2022-01-01 RX ORDER — ALBUTEROL SULFATE 0.83 MG/ML
2.5 SOLUTION RESPIRATORY (INHALATION) EVERY 4 HOURS PRN
Status: DISCONTINUED | OUTPATIENT
Start: 2022-01-01 | End: 2022-01-01 | Stop reason: HOSPADM

## 2022-01-01 RX ORDER — LORAZEPAM 2 MG/ML
1 INJECTION INTRAMUSCULAR ONCE
Status: DISCONTINUED | OUTPATIENT
Start: 2022-01-01 | End: 2022-01-01

## 2022-01-01 RX ORDER — SODIUM CHLORIDE 9 MG/ML
INJECTION, SOLUTION INTRAVENOUS CONTINUOUS
Status: DISCONTINUED | OUTPATIENT
Start: 2022-01-01 | End: 2022-01-01 | Stop reason: HOSPADM

## 2022-01-01 RX ORDER — ACETAMINOPHEN 325 MG/1
975 TABLET ORAL 3 TIMES DAILY
Status: DISCONTINUED | OUTPATIENT
Start: 2022-01-01 | End: 2022-01-01 | Stop reason: HOSPADM

## 2022-01-01 RX ORDER — NITROGLYCERIN 5 MG/ML
VIAL (ML) INTRAVENOUS
Status: DISCONTINUED | OUTPATIENT
Start: 2022-01-01 | End: 2022-01-01 | Stop reason: HOSPADM

## 2022-01-01 RX ORDER — LORAZEPAM 2 MG/ML
1 INJECTION INTRAMUSCULAR
Status: DISCONTINUED | OUTPATIENT
Start: 2022-01-01 | End: 2022-01-01 | Stop reason: HOSPADM

## 2022-01-01 RX ORDER — ATROPINE SULFATE 0.1 MG/ML
0.5 INJECTION INTRAVENOUS
Status: ACTIVE | OUTPATIENT
Start: 2022-01-01 | End: 2022-01-01

## 2022-01-01 RX ORDER — ZINC SULFATE 50(220)MG
220 CAPSULE ORAL DAILY
COMMUNITY

## 2022-01-01 RX ORDER — CHOLECALCIFEROL (VITAMIN D3) 50 MCG
2000 TABLET ORAL DAILY
Status: DISCONTINUED | OUTPATIENT
Start: 2022-01-01 | End: 2022-01-01 | Stop reason: HOSPADM

## 2022-01-01 RX ORDER — PROCHLORPERAZINE 25 MG
12.5 SUPPOSITORY, RECTAL RECTAL EVERY 12 HOURS PRN
Status: DISCONTINUED | OUTPATIENT
Start: 2022-01-01 | End: 2022-01-01 | Stop reason: HOSPADM

## 2022-01-01 RX ORDER — FLUMAZENIL 0.1 MG/ML
0.2 INJECTION, SOLUTION INTRAVENOUS
Status: ACTIVE | OUTPATIENT
Start: 2022-01-01 | End: 2022-01-01

## 2022-01-01 RX ORDER — ALBUTEROL SULFATE 0.83 MG/ML
2.5 SOLUTION RESPIRATORY (INHALATION) EVERY 6 HOURS PRN
Status: DISCONTINUED | OUTPATIENT
Start: 2022-01-01 | End: 2022-01-01

## 2022-01-01 RX ORDER — LORAZEPAM 2 MG/ML
.5-1 INJECTION INTRAMUSCULAR EVERY 4 HOURS
Status: DISCONTINUED | OUTPATIENT
Start: 2022-01-01 | End: 2022-01-01

## 2022-01-01 RX ORDER — PREDNISONE 20 MG/1
TABLET ORAL
Qty: 20 TABLET | Refills: 0 | Status: ON HOLD | OUTPATIENT
Start: 2022-01-01 | End: 2022-01-01

## 2022-01-01 RX ORDER — IOPAMIDOL 755 MG/ML
57 INJECTION, SOLUTION INTRAVASCULAR ONCE
Status: COMPLETED | OUTPATIENT
Start: 2022-01-01 | End: 2022-01-01

## 2022-01-01 RX ORDER — ALBUTEROL SULFATE 0.83 MG/ML
2.5 SOLUTION RESPIRATORY (INHALATION) EVERY 4 HOURS PRN
Status: ON HOLD | COMMUNITY
End: 2022-01-01

## 2022-01-01 RX ORDER — ACETAMINOPHEN 325 MG/1
650 TABLET ORAL EVERY 6 HOURS PRN
Status: DISCONTINUED | OUTPATIENT
Start: 2022-01-01 | End: 2022-01-01 | Stop reason: HOSPADM

## 2022-01-01 RX ORDER — LIDOCAINE 40 MG/G
CREAM TOPICAL
Status: DISCONTINUED | OUTPATIENT
Start: 2022-01-01 | End: 2022-01-01 | Stop reason: HOSPADM

## 2022-01-01 RX ADMIN — METHYLPHENIDATE HYDROCHLORIDE 20 MG: 10 TABLET, EXTENDED RELEASE ORAL at 20:32

## 2022-01-01 RX ADMIN — POTASSIUM CHLORIDE 20 MEQ: 1500 TABLET, EXTENDED RELEASE ORAL at 05:56

## 2022-01-01 RX ADMIN — ASPIRIN 325 MG ORAL TABLET 325 MG: 325 PILL ORAL at 16:18

## 2022-01-01 RX ADMIN — METHYLPHENIDATE HYDROCHLORIDE 20 MG: 10 TABLET, EXTENDED RELEASE ORAL at 08:21

## 2022-01-01 RX ADMIN — REMDESIVIR 200 MG: 100 INJECTION, POWDER, LYOPHILIZED, FOR SOLUTION INTRAVENOUS at 23:57

## 2022-01-01 RX ADMIN — SODIUM CHLORIDE 84 ML: 900 INJECTION INTRAVENOUS at 14:57

## 2022-01-01 RX ADMIN — METHYLPHENIDATE HYDROCHLORIDE 20 MG: 10 TABLET, EXTENDED RELEASE ORAL at 08:58

## 2022-01-01 RX ADMIN — REMDESIVIR 100 MG: 100 INJECTION, POWDER, LYOPHILIZED, FOR SOLUTION INTRAVENOUS at 19:40

## 2022-01-01 RX ADMIN — POLYETHYLENE GLYCOL 3350 17 G: 17 POWDER, FOR SOLUTION ORAL at 09:29

## 2022-01-01 RX ADMIN — ZINC SULFATE 220 MG (50 MG) CAPSULE 220 MG: CAPSULE at 11:56

## 2022-01-01 RX ADMIN — PRENATAL VITAMINS-IRON FUMARATE 27 MG IRON-FOLIC ACID 0.8 MG TABLET 1 TABLET: at 09:03

## 2022-01-01 RX ADMIN — FUROSEMIDE 40 MG: 10 INJECTION, SOLUTION INTRAVENOUS at 09:07

## 2022-01-01 RX ADMIN — IOPAMIDOL 58 ML: 755 INJECTION, SOLUTION INTRAVENOUS at 18:37

## 2022-01-01 RX ADMIN — SODIUM CHLORIDE 84 ML: 9 INJECTION, SOLUTION INTRAVENOUS at 18:37

## 2022-01-01 RX ADMIN — IOPAMIDOL 57 ML: 755 INJECTION, SOLUTION INTRAVENOUS at 14:55

## 2022-01-01 RX ADMIN — METHYLPHENIDATE HYDROCHLORIDE 20 MG: 10 TABLET, EXTENDED RELEASE ORAL at 16:25

## 2022-01-01 RX ADMIN — ACETAMINOPHEN 975 MG: 325 TABLET, FILM COATED ORAL at 08:28

## 2022-01-01 RX ADMIN — PREDNISONE 40 MG: 20 TABLET ORAL at 12:19

## 2022-01-01 RX ADMIN — DEXAMETHASONE SODIUM PHOSPHATE 6 MG: 4 INJECTION, SOLUTION INTRAMUSCULAR; INTRAVENOUS at 19:26

## 2022-01-01 RX ADMIN — HUMAN ALBUMIN MICROSPHERES AND PERFLUTREN 9 ML: 10; .22 INJECTION, SOLUTION INTRAVENOUS at 11:37

## 2022-01-01 RX ADMIN — SODIUM CHLORIDE 10 ML/HR: 9 INJECTION, SOLUTION INTRAVENOUS at 10:14

## 2022-01-01 RX ADMIN — Medication 2000 UNITS: at 08:33

## 2022-01-01 RX ADMIN — LORAZEPAM 1.5 MG: 2 INJECTION INTRAMUSCULAR; INTRAVENOUS at 17:25

## 2022-01-01 RX ADMIN — METHYLPHENIDATE HYDROCHLORIDE 20 MG: 10 TABLET, EXTENDED RELEASE ORAL at 12:44

## 2022-01-01 RX ADMIN — FUROSEMIDE 40 MG: 10 INJECTION, SOLUTION INTRAVENOUS at 16:17

## 2022-01-01 RX ADMIN — DEXAMETHASONE 6 MG: 2 TABLET ORAL at 13:13

## 2022-01-01 RX ADMIN — ACETAMINOPHEN 975 MG: 325 TABLET, FILM COATED ORAL at 20:31

## 2022-01-01 RX ADMIN — POLYETHYLENE GLYCOL 3350 17 G: 17 POWDER, FOR SOLUTION ORAL at 08:29

## 2022-01-01 RX ADMIN — FUROSEMIDE 40 MG: 10 INJECTION, SOLUTION INTRAVENOUS at 08:33

## 2022-01-01 RX ADMIN — METHYLPHENIDATE HYDROCHLORIDE 20 MG: 10 TABLET, EXTENDED RELEASE ORAL at 08:27

## 2022-01-01 RX ADMIN — SODIUM CHLORIDE 50 ML: 9 INJECTION, SOLUTION INTRAVENOUS at 20:22

## 2022-01-01 RX ADMIN — REMDESIVIR 100 MG: 100 INJECTION, POWDER, LYOPHILIZED, FOR SOLUTION INTRAVENOUS at 20:05

## 2022-01-01 RX ADMIN — METHYLPHENIDATE HYDROCHLORIDE 20 MG: 10 TABLET, EXTENDED RELEASE ORAL at 16:39

## 2022-01-01 RX ADMIN — REMDESIVIR 100 MG: 100 INJECTION, POWDER, LYOPHILIZED, FOR SOLUTION INTRAVENOUS at 20:22

## 2022-01-01 RX ADMIN — LORAZEPAM 0.5 MG: 0.5 TABLET ORAL at 20:32

## 2022-01-01 RX ADMIN — DEXAMETHASONE 6 MG: 2 TABLET ORAL at 13:06

## 2022-01-01 RX ADMIN — DEXAMETHASONE 6 MG: 2 TABLET ORAL at 12:41

## 2022-01-01 RX ADMIN — FUROSEMIDE 40 MG: 10 INJECTION, SOLUTION INTRAVENOUS at 09:03

## 2022-01-01 RX ADMIN — Medication 2000 UNITS: at 09:03

## 2022-01-01 RX ADMIN — POTASSIUM CHLORIDE 20 MEQ: 1500 TABLET, EXTENDED RELEASE ORAL at 12:41

## 2022-01-01 RX ADMIN — METHYLPHENIDATE HYDROCHLORIDE 20 MG: 10 TABLET, EXTENDED RELEASE ORAL at 20:05

## 2022-01-01 RX ADMIN — B-COMPLEX W/ C & FOLIC ACID TAB 1 TABLET: TAB at 08:33

## 2022-01-01 RX ADMIN — METHYLPHENIDATE HYDROCHLORIDE 20 MG: 10 TABLET, EXTENDED RELEASE ORAL at 13:06

## 2022-01-01 RX ADMIN — REMDESIVIR 100 MG: 100 INJECTION, POWDER, LYOPHILIZED, FOR SOLUTION INTRAVENOUS at 20:36

## 2022-01-01 RX ADMIN — METHYLPHENIDATE HYDROCHLORIDE 20 MG: 10 TABLET, EXTENDED RELEASE ORAL at 12:19

## 2022-01-01 RX ADMIN — PRENATAL VITAMINS-IRON FUMARATE 27 MG IRON-FOLIC ACID 0.8 MG TABLET 1 TABLET: at 08:33

## 2022-01-01 RX ADMIN — ZINC SULFATE 220 MG (50 MG) CAPSULE 220 MG: CAPSULE at 09:03

## 2022-01-01 RX ADMIN — SODIUM CHLORIDE 500 ML: 9 INJECTION, SOLUTION INTRAVENOUS at 09:43

## 2022-01-01 RX ADMIN — Medication 1 APPLICATION.: at 17:42

## 2022-01-01 RX ADMIN — FUROSEMIDE 40 MG: 10 INJECTION, SOLUTION INTRAVENOUS at 16:07

## 2022-01-01 RX ADMIN — ZINC SULFATE 220 MG (50 MG) CAPSULE 220 MG: CAPSULE at 08:33

## 2022-01-01 RX ADMIN — FUROSEMIDE 40 MG: 10 INJECTION, SOLUTION INTRAVENOUS at 16:30

## 2022-01-01 RX ADMIN — B-COMPLEX W/ C & FOLIC ACID TAB 1 TABLET: TAB at 09:03

## 2022-01-01 RX ADMIN — HEPARIN SODIUM 1200 UNITS/HR: 1000 INJECTION INTRAVENOUS; SUBCUTANEOUS at 09:07

## 2022-01-01 RX ADMIN — ENOXAPARIN SODIUM 40 MG: 40 INJECTION SUBCUTANEOUS at 20:23

## 2022-01-01 RX ADMIN — METHYLPHENIDATE HYDROCHLORIDE 20 MG: 10 TABLET, EXTENDED RELEASE ORAL at 13:13

## 2022-01-01 RX ADMIN — HEPARIN SODIUM 750 UNITS/HR: 1000 INJECTION INTRAVENOUS; SUBCUTANEOUS at 16:19

## 2022-01-01 RX ADMIN — METHYLPHENIDATE HYDROCHLORIDE 20 MG: 10 TABLET, EXTENDED RELEASE ORAL at 12:41

## 2022-01-01 RX ADMIN — ASPIRIN 325 MG ORAL TABLET 325 MG: 325 PILL ORAL at 10:14

## 2022-01-01 RX ADMIN — HEPARIN SODIUM 1650 UNITS/HR: 1000 INJECTION INTRAVENOUS; SUBCUTANEOUS at 11:39

## 2022-01-01 RX ADMIN — SODIUM CHLORIDE 50 ML: 9 INJECTION, SOLUTION INTRAVENOUS at 19:41

## 2022-01-01 RX ADMIN — B-COMPLEX W/ C & FOLIC ACID TAB 1 TABLET: TAB at 11:56

## 2022-01-01 RX ADMIN — METHYLPHENIDATE HYDROCHLORIDE 20 MG: 10 TABLET, EXTENDED RELEASE ORAL at 16:16

## 2022-01-01 RX ADMIN — POLYETHYLENE GLYCOL 3350 17 G: 17 POWDER, FOR SOLUTION ORAL at 08:21

## 2022-01-01 RX ADMIN — ACETAMINOPHEN 975 MG: 325 TABLET, FILM COATED ORAL at 19:41

## 2022-01-01 RX ADMIN — DEXAMETHASONE 6 MG: 2 TABLET ORAL at 12:19

## 2022-01-01 RX ADMIN — PRENATAL VITAMINS-IRON FUMARATE 27 MG IRON-FOLIC ACID 0.8 MG TABLET 1 TABLET: at 11:56

## 2022-01-01 RX ADMIN — ACETAMINOPHEN 975 MG: 325 TABLET, FILM COATED ORAL at 20:05

## 2022-01-01 RX ADMIN — Medication 2000 UNITS: at 10:09

## 2022-01-01 RX ADMIN — METHYLPHENIDATE HYDROCHLORIDE 20 MG: 10 TABLET, EXTENDED RELEASE ORAL at 08:09

## 2022-01-01 RX ADMIN — ENOXAPARIN SODIUM 40 MG: 40 INJECTION SUBCUTANEOUS at 23:28

## 2022-01-01 ASSESSMENT — ACTIVITIES OF DAILY LIVING (ADL)
ADLS_ACUITY_SCORE: 4
ADLS_ACUITY_SCORE: 12
ADLS_ACUITY_SCORE: 4
ADLS_ACUITY_SCORE: 12
ADLS_ACUITY_SCORE: 4

## 2022-01-01 ASSESSMENT — PAIN SCALES - GENERAL
PAINLEVEL: NO PAIN (0)
PAINLEVEL: NO PAIN (0)

## 2022-01-01 ASSESSMENT — ENCOUNTER SYMPTOMS
SHORTNESS OF BREATH: 1
COUGH: 1
SHORTNESS OF BREATH: 1

## 2022-01-01 ASSESSMENT — MIFFLIN-ST. JEOR
SCORE: 1364.5
SCORE: 1361.03

## 2022-01-18 NOTE — PROGRESS NOTES
PFT Lab Note: Complete PFT (pre/post lizet, pleth, DLCO) and 6 minute walk done for apt w/ Dr Hammonds.

## 2022-01-24 NOTE — TELEPHONE ENCOUNTER
Cathryn is calling wanting a call back, due to insurance this needs to be sent to [facility] as per insurance he has a 5 year plan with them. Please advise. Thank you.

## 2022-01-24 NOTE — TELEPHONE ENCOUNTER
Signed O2 orders have been faxed to [facility] @ [phone]. Patient was ordered 2L of O2 continuous w/activity/exertion. No POC ordered, only home concentrator.    Trey Cao LPN  Pulmonary Medicine:  [facility]  Phone: [phone] Fax: [phone]

## 2022-01-24 NOTE — TELEPHONE ENCOUNTER
Contacted [facility]  @ [phone]. Requested CT chest w/o from 07/15/2021 be pushed to  PACS.    Contacted [facility] @[phone]. Requested CT chest thorax w/o from 02/09/2021 and 03/09/2021 be pushed to  PACS. Also requested CT chest pulmonary angio from 12/22/2020. Reports available for review in care everywhere.    Trey Cao LPN  Pulmonary Medicine:  [facility]  Phone: [phone] Fax: [phone]

## 2022-01-24 NOTE — PROGRESS NOTES
Josr Jamison's goals for this visit include: Consult  He requests these members of his care team be copied on today's visit information: PCP    PCP: Elizabeth Kebede    Referring Provider:  No referring provider defined for this encounter.    BP (!) 142/82   Pulse 88   Temp 98.1  F (36.7  C)   Resp 18   Wt 63 kg (139 lb)   SpO2 94%   BMI 21.29 kg/m      Do you need any medication refills at today's visit? N    Trey Cao LPN  Pulmonary Medicine:  [facility]  Phone: [phone] Fax: [phone]

## 2022-01-24 NOTE — PROGRESS NOTES
Pulmonary Clinic New Patient Consult  Reason for Consult: Pulmonary fibrosis  History of Present Illness  I had the pleasure of meeting Elton Jamison, who is a 71-year-old male (former smoker, 38-pack-years) who presents to clinic for an evaluation of pulmonary fibrosis.  He was previously seen at the ILD clinic over at [place].  To briefly review, Sean was admitted at [facility] on 12/22/2020 for acute hypoxic respiratory failure.  He had presented with rapidly progressive shortness of breath over 6 day period in late December.  CT imaging at that time revealed bilateral interstitial pulmonary infiltrates as well as groundglass opacities.  Infectious work-up was negative including Covid influenza and RSV.  An extensive autoimmune/serologic work-up was obtained which returned negative.  He was treated on the medical floor and required as high as 4 to 5 L, he was discharged on 3 L of supplemental oxygen continuously.  An echo done at that time was suggestive of pulmonary hypertension.  Following discharge, Sean continued to have persistent symptoms of cough, shortness of breath and subjective fevers albeit being improved.  He was then seen at the [facility] in March 2021 for interstitial lung disease. 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  A remote review of CT cervical spine from 2014 showed evidence of ILD changes in the upper lung fields indicative of a long standing lung disease process. He was initiated on a course of prednisone and trimethoprim-sulfamethoxazole, the latter for pneumocystis prophylaxis. A repeat serologic, autoimmune and vasculitic work-up returned negative.  He was then referred to thoracic surgery and he underwent surgical lung biopsy which revealed a UIP-like pattern with abundant inflammatory cell infiltrates, organizing pneumonia, and focal diffuse alveolar  "damage. Note, the biopsy slides were reviewed at [facility]. He was then continued on a prolonged steroid taper which was bridged with Cellcept.  Unfortunately, he was unable to tolerate mycophenolate and he weaned himself off of prednisone.   He did have a telehealth visit with Dr Hasmukh Reyes in Cardiology in the [place] system, and he has an MRI heart and right cath done which ruled out right heart disease and pulmonary hypertension.  Review of imaging from February and March 2021 shows groundglass opacities and fibrotic changes bilaterally. Review of his CT cervical spine in 2014 shows parenchymal changes in the partially visualized upper lobes consistent with interstitial lung disease, though findings are not as prominent as now.  Today, Sean continues to have some dyspnea with exertion much improved from March 2020.  He believes that he is close to 60% of his baseline.  He still able to perform calisthenics which is the kind of exercise he is used to doing.  He recently returned from [place] and noted that he was unable to explore several areas like he used to in the past due to exercise intolerance.  He has minimal cough no wheezing and no chest tightness.  He denies any cyanosis, no chest pain, no dysphagia, no dysphonia, no pedal swellings.  He denies any GERD-like symptoms.  He has been able to wean his oxygen off and currently requires no oxygen at rest.  He occasionally requires oxygen up to 1 to 2 L during periods of prolonged exertion.  He is completely off steroids.    On further questioning, historically, he had noted that he has periods of increased shortness of breath and coughing with subjective fevers going back to about 8 to 10 years ago.  During this periods of \"pneumonia flares\" as this is what he referred to them as, he never sought any medical care and he will usually spontaneously improve in the next few days.  Remote history of smoking, 38 pack years of smoking, quit in " .  Longtime history of working with carpets.  Sean has been exposed to a lot of carpet dust and he never wore any protective gears or respiratory filters.  No family history of lung cancer.    Review of Systems:  10 of 14 systems reviewed and are negative unless otherwise stated in HPI.    Past Medical History:   Diagnosis Date     ADD (attention deficit disorder)      ADD (attention deficit disorder)      CARDIOVASCULAR SCREENING; LDL GOAL LESS THAN 130 2013    [place] Risk Score: 12% (13 Total Points)      Cataract     bilateral     Clavicle fracture      Foot fracture, right      Horseback riding age 27    bucked off horse, torn urethra, catheter for a time     Hyperlipidemia LDL goal <130 2013    [place] Risk Score: 12% (13 Total Points)      Hypertension goal BP (blood pressure) < 140/90 2013    Attempting to control with diet and exercise     Lung disease        Past Surgical History:   Procedure Laterality Date     CATARACT IOL, RT/LT      bilateral     COLONOSCOPY  2013     CV RIGHT HEART CATH MEASUREMENTS RECORDED N/A 2021    Procedure: CV RIGHT HEART CATH;  Surgeon: Amador English MD;  Location: [facility] CARDIAC CATH LAB     HERNIORRHAPHY INGUINAL Right 2016    Procedure: HERNIORRHAPHY INGUINAL;  Surgeon: Andriy Sheehan MD;  Location:  OR     LAPAROSCOPIC HERNIORRHAPHY INGUINAL  2013    Procedure: LAPAROSCOPIC HERNIORRHAPHY INGUINAL;  Laparoscopic inguinal hernia repair;  Surgeon: Andriy Sheehan MD;  [facility]  [facility]     [facility]     MOUTH SURGERY  In his 20's    [place] Teeth and tooth pulled     OPEN REDUCTION INTERNAL FIXATION CLAVICLE  2014    Procedure: OPEN REDUCTION INTERNAL FIXATION CLAVICLE;  Surgeon: Manjinder Landon MD;  Location:  OR       Family History   Problem Relation Age of Onset     Cancer Mother         Brain   82 or 83     Diabetes Maternal Grandfather      C.A.D. Maternal Grandfather      Hypertension  Maternal Grandfather      MOHAMUD. Brother 57        CABG, 3 vessel with valve replacement     Hypertension Brother      Allergies Brother         seasonal     Arthritis Sister      MOHAMUD. Father         cabg, stents.      Diabetes Other         Uncle - fathers side of family     Asthma No family hx of      Cerebrovascular Disease No family hx of      Breast Cancer No family hx of      Cancer - colorectal No family hx of      Prostate Cancer No family hx of      Alcohol/Drug No family hx of      Alzheimer Disease No family hx of      Anesthesia Reaction No family hx of      Blood Disease No family hx of        Social History     Socioeconomic History     Marital status:      Spouse name: Kasey     Number of children: 0     Years of education: None     Highest education level: None   Occupational History     Occupation:      Employer: SELF     Comment: Floor Covering   Tobacco Use     Smoking status: Former Smoker     Packs/day: 1.50     Years: 20.00     Pack years: 30.00     Quit date: 1995     Years since quittin.0     Smokeless tobacco: Never Used   Substance and Sexual Activity     Alcohol use: Yes     Comment: couple times per week - 2-3     Drug use: No     Sexual activity: Yes     Partners: Female   Other Topics Concern     Parent/sibling w/ CABG, MI or angioplasty before 65F 55M? Not Asked      Service Not Asked     Blood Transfusions Not Asked     Caffeine Concern Not Asked     Comment: 1 cup of coffee in the am and tea     Occupational Exposure Not Asked     Hobby Hazards Not Asked     Sleep Concern Not Asked     Stress Concern Yes     Comment: job related -     Weight Concern Not Asked     Special Diet Not Asked     Back Care Not Asked     Exercise Not Asked     Comment: every morning. Yoga.     Bike Helmet Not Asked     Seat Belt Not Asked     Self-Exams Not Asked   Social History Narrative     None     Social Determinants of Health     Financial Resource Strain: Not on  file   Food Insecurity: Not on file   Transportation Needs: Not on file   Physical Activity: Not on file   Stress: Not on file   Social Connections: Not on file   Intimate Partner Violence: Not on file   Housing Stability: Not on file         Allergies   Allergen Reactions     Ether      Told as a child -          Current Outpatient Medications:      albuterol (PROAIR HFA/PROVENTIL HFA/VENTOLIN HFA) 108 (90 Base) MCG/ACT inhaler, Inhale 2 puffs into the lungs, Disp: , Rfl:      B Complex Vitamins (VITAMIN B-COMPLEX) TABS, Take 1 tablet by mouth daily, Disp: , Rfl:      Cholecalciferol 10 MCG (400 UNIT) CAPS, , Disp: , Rfl:      Fe Fum-FA-B Xpq-Q-As-Mg-Mn-Cu (HEMATINIC PLUS VIT/MINERALS) 106-1 MG TABS, Take 1 tablet by mouth daily, Disp: , Rfl:      fluocinonide (LIDEX) 0.05 % external ointment, Apply to the hands qhs, cover with thin layer of Vaseline and put on 100% cotton gloves for up to 1-2 wks, Disp: , Rfl:      IBUPROFEN PO, , Disp: , Rfl:      lidocaine-prilocaine (EMLA) 2.5-2.5 % external cream, Apply topically as needed for moderate pain, Disp: 5 g, Rfl: 3     methylphenidate (METADATE ER) 20 MG CR tablet, , Disp:  , Rfl:      metroNIDAZOLE (METROGEL) 0.75 % external gel, Apply 1 Application topically, Disp: , Rfl:      Multiple Vitamins-Minerals (MULTIVITAMIN & MINERAL PO), Take  by mouth., Disp: , Rfl:      augmented betamethasone dipropionate (DIPROLENE-AF) 0.05 % external cream, Apply 1 Application topically (Patient not taking: Reported on 1/24/2022), Disp: , Rfl:      pravastatin (PRAVACHOL) 20 MG tablet, Take 1 tablet (20 mg) by mouth daily (Patient not taking: Reported on 1/24/2022), Disp: 30 tablet, Rfl: 11     predniSONE (DELTASONE) 10 MG tablet, Take 10 mg by mouth daily (Patient not taking: Reported on 1/24/2022), Disp: , Rfl:      umeclidinium-vilanterol (ANORO ELLIPTA) 62.5-25 MCG/INH oral inhaler, , Disp: , Rfl:       Physical Exam:  BP (!) 142/82   Pulse 88   Temp 98.1  F (36.7  C)   Resp  18   Wt 63 kg (139 lb)   SpO2 94%   BMI 21.29 kg/m    GENERAL: Well developed, well nourished, alert, and in no apparent distress.  HEENT: Normocephalic, atraumatic. PERRL, EOMI. Oral mucosa is moist. No perioral cyanosis.  NECK: supple, no masses, no thyromegaly.  RESP:  Fine inspiratory crackles more prominent in the upper lobes.  No rales, wheezes, rhonchi.  No cyanosis or clubbing.  CV: Normal S1, S2, regular rhythm, normal rate. No murmur.  No LE edema.   ABDOMEN:  Soft, non-tender, non-distended.   SKIN: warm and dry. No rash.  NEURO: AAOx3.  Normal gait.  Fluent speech.  PSYCH: mentation appears normal.       Results:  PFTs: Moderate restriction with significantly impaired diffusion. No positive bronchodilator response  Most Recent [facility] Pulmonary Function Testing    FVC-Pred   Date Value Ref Range Status   01/18/2022 3.82 L      FVC-Pre   Date Value Ref Range Status   01/18/2022 1.82 L      FVC-%Pred-Pre   Date Value Ref Range Status   01/18/2022 47 %      FEV1-Pre   Date Value Ref Range Status   01/18/2022 1.71 L      FEV1-%Pred-Pre   Date Value Ref Range Status   01/18/2022 58 %      FEV1FVC-Pred   Date Value Ref Range Status   01/18/2022 77 %      FEV1FVC-Pre   Date Value Ref Range Status   01/18/2022 94 %      No results found for: 20029  FEFMax-Pred   Date Value Ref Range Status   01/18/2022 7.70 L/sec      FEFMax-Pre   Date Value Ref Range Status   01/18/2022 6.04 L/sec      FEFMax-%Pred-Pre   Date Value Ref Range Status   01/18/2022 78 %      ExpTime-Pre   Date Value Ref Range Status   01/18/2022 5.57 sec      FIFMax-Pre   Date Value Ref Range Status   01/18/2022 2.07 L/sec      FEV1FEV6-Pred   Date Value Ref Range Status   01/18/2022 78 %      FEV1FEV6-Pre   Date Value Ref Range Status   01/18/2022 94 %      No results found for: 20055  Imaging (personally reviewed in clinic today):  CT Chest 01/18/2022  The included thyroid appears unremarkable. Several nonenlarged bilateral axillary lymph nodes  are present. Nonenlarged  mediastinal lymph nodes are also present at multiple stations.  Pulmonary arteries relatively large approximately 3.8 cm. Thoracic aorta is upper normal size. Extensive multivessel coronary artery calcium. Thoracic aortic an abdominal aortic and extensive splenic artery calcification. No pleural or pericardial effusion. No suspicious upper abdominal mass. Slightly patulous distal esophagus without obvious wall thickening. Subpleural and other diffuse fibrotic changes with upper lobe honeycombing. No discrete isolated pulmonary nodule. Central airways are nicely patent. Expiration shows mild air trapping. There is bilateral lower lobe bronchiectasis an upper lobe bronchiectasis. No significant bronchial wall thickening. Osteopenia. Thoracic spondylosis. Malleable plate and screw of the right clavicle.    CT CHEST WITHOUT IV CONTRAST- 07/15/2022  FINDINGS: Diffuse bilateral patchy reticular and fibrotic groundglass opacities  involving the central and peripheral lungs with associated traction  bronchiectasis, but no definite honeycombing. Scattered mild air trapping on  expiratory imaging. Findings have not changed significantly since 02/09/2021.  Tiny calcified granuloma left lower lobe. Stable PO changes of right lung wedge resections.   Stable prominent mediastinal and bilateral axillary nodes are likely reactive.   Enlargement of the central pulmonary arteries suggests pulmonary arterial hypertension. Prominent coronary artery calcification. Mild cardiomegaly.     Internal fixation across an old right clavicle fracture. Mild kyphosis. Mild hypertrophic changes in the spine. Old right rib fractures.      Assessment and Plan:   Interstitial Lung Disease  Review of PFTs indicate moderate restriction with impaired diffusion.  I do not have his prior outside images uploaded in our PACS, but from review of the radiologist report, it does appear that his current CT has mostly fibrotic changes  and less groundglass opacities. There appears to be some honeycombing predominant in the upper lung regions which overall supports chronic hypersensitivity pneumonitis given his history of occupational exposures.  However, surgical lung biopsy which revealed a UIP-like pattern with abundant inflammatory cell infiltrates, organizing pneumonia, and focal diffuse alveolar damage  Review of his CT cervical spine in 2014 shows parenchymal changes in the partially visualized upper lobes consistent with interstitial lung disease which implies that this is likely a long standing disease with an acute insult (maybe viral) in March 2020 leading to hospitalization.   Although, he was placed on anti-inflammatory/immunosuppressants for treatment (Cellcept), it is my opinion that antifibrotics may be a better option given that his most recent CT scan appears to show more fibrosis and honeycombing and very minimal groundglass opacities. We discussed starting Nintedanib 150 mg BID and the side effects which includes GI symptoms and liver toxicity.  I will discuss his case at the ILD meeting next week.  We also discussed antigen avoidance as it relates to his current occupation with carpets.  Sean states that he has a few more years to go before he retires, but he currently wears the necessary protective wearing such as respirators when he is on the job.  He remains very active at baseline which I encouraged him to continue to do. He doesn't want pulmonary rehab and he is up to date on his prescriptions.  Chronic Hypoxic respiratory failure  Cor pulmonale /pulmonary hypertension ruled out based on recent right heart cath. 6 min walk test suggests only exertional hypoxemia requiring about 2 LPM. Will go ahead and prescribe supplemental O2.    I certify that this patient, Josr Jamison has been under my care (or a nurse practitioner or physican's assistant working with me). This is the face-to-face encounter for oxygen medical  necessity.      Josr Jamison is now in a chronic stable state and continues to require supplemental oxygen. Patient has continued oxygen desaturation due to Pulmonary Fibrosis J84.10.    Alternative treatment(s) tried or considered and deemed clinically infective for treatment of Pulmonary Fibrosis J84.10 include steroids.  If portability is ordered, is the patient mobile within the home? yes    **Patients who qualify for home O2 coverage under the CMS guidelines require ABG tests or O2 sat readings obtained closest to, but no earlier than 2 days prior to the discharge, as evidence of the need for home oxygen therapy. Testing must be performed while patient is in the chronic stable state. See notes for O2 sats.**    Questions and concerns were answered to the patient's satisfaction.  he was provided with my contact information should new questions or concerns arise in the interim.  He should return to clinic in 3 months with PFTs  Up to date on vaccinations  I spent a total of 60 minutes face to face with Josr Jamison during today's office visit. Over 50% of this time was spent counseling the patient and/or coordinating care regarding their pulmonary disease.      Wei Hammonds MD  Pulmonary, Critical Care and Sleep Medicine  [facility]  Pager: [phone]          The above note was dictated using voice recognition software and may include typographical errors. Please contact the author for any clarifications.

## 2022-01-24 NOTE — RESULT ENCOUNTER NOTE
Results discussed directly with patient while patient was present. Any further details documented in the note.   Wei Hammonds MD

## 2022-01-24 NOTE — TELEPHONE ENCOUNTER
Contacted Cathryn with [facility]. Per Cathryn, pt already has a 5 year contract w/ [facility] for O2/supplies. Signed O2 orders have been faxed to [facility] @ [phone].    Trey Cao LPN  Pulmonary Medicine:  [facility]  Phone: [phone] Fax: [phone]

## 2022-02-07 PROBLEM — U07.1 INFECTION DUE TO 2019 NOVEL CORONAVIRUS: Status: ACTIVE | Noted: 2022-01-01

## 2022-02-07 PROBLEM — R09.02 HYPOXIA: Status: ACTIVE | Noted: 2022-01-01

## 2022-02-07 NOTE — TELEPHONE ENCOUNTER
"Patient called stating \"my body is in distress.\" States that he was just at the chiropracter and ever since Friday his oxygen levels have been low 90's-high 80's and his heart rate has been above 100. Currently patient is on oxygen at 5L O2 sats 85 P 114. Patient is very anxious on the phone, states \"just tell me where to go.\" Tried to start a phone triage but he states \"I just want to know where to go\" I let him know based off of his numbers currently he should be evaluated in the ER.    Nancy Roth RN [facility]    "

## 2022-02-07 NOTE — ED TRIAGE NOTES
SOB.  Was on oxygen last year for 8 months.  Uses as needed until last few days.  HR has been 110-115.  SOB a week ago.  Laboring but recovered when sitting down.  Fri could not recover when sitting down.  Now using oxygen 5 lt constantly.  Had pneumonia but lungs were compromised after.  Coughing up phelgm.   O2 sat 88% with ambulating.  (Patient has own oximeter).

## 2022-02-07 NOTE — ED PROVIDER NOTES
History   Chief Complaint:  Shortness of Breath       The history is provided by the patient.      Josr Jamison is a 71 year old male with history of hypertension, hyperlipidemia, and ILD who presents with shortness of breath. For the past week, the patient has been experiencing worsening shortness of breath. At home, he has been using his inhaler as needed over the past few days and is constantly on 5L of oxygen. Additionally, he notes having a productive cough. His symptoms are exacerbated with exertion and notes that three days ago he could not recover even after sitting down. He also had a recorded O2 sat at home of 88% while ambulating. He does have a past history of pneumonia and mentions his lungs were compromised after. He is COVID vaccinated and boosted.     Review of Systems   Respiratory: Positive for cough and shortness of breath.    All other systems reviewed and are negative.        Allergies:  Ether    Medications:  Albuterol inhaler  Methylphenidate  Nintedanib  Anoro Ellipta inhaler   Pravastatin     Past Medical History:     ADD  Cataracts  Clavicle fracture  Right foot fracture  Hyperlipidemia  Hypertension  ILD  Right inguinal hernia  Hepatitis       Past Surgical History:    Bilateral cataract removal  Colonoscopy  Right heart cath  Herniorrhaphy inguinal   Lasik  [place] teeth extraction  Open reduction internal fixation clavicle     Family History:    Mother: brain cancer  Brother: hypertension, seasonal allergies  Sister: arthritis  Father: CAD, CABG, stents     Social History:  The patient presents to the ED alone. His pulmonologist is Dr. Wei Hammonds.       Physical Exam     Patient Vitals for the past 24 hrs:   BP Temp Temp src Pulse Resp SpO2 Height Weight   02/07/22 1946 -- -- -- 90 15 97 % -- --   02/07/22 1945 -- -- -- 91 12 97 % -- --   02/07/22 1801 -- -- -- 101 23 97 % -- --   02/07/22 1800 (!) 156/99 -- -- 104 25 98 % -- --   02/07/22 1755 -- -- -- 101 22 99 % -- --   02/07/22  "1754 -- -- -- 99 18 99 % -- --   02/07/22 1713 -- -- -- 105 (!) 34 99 % -- --   02/07/22 1711 -- -- -- 107 20 99 % -- --   02/07/22 1710 (!) 185/121 -- -- 107 23 99 % -- --   02/07/22 1625 (!) 172/102 98.7  F (37.1  C) Temporal 107 20 94 % 1.721 m (5' 7.75\") 63 kg (139 lb)       Physical Exam  Vitals: reviewed by me  General: Pt seen on [facility], cooperative, and alert to conversation  Eyes: Tracking well, clear conjunctiva BL  ENT: MMM, midline trachea.   Lungs: Tachypneic, mild accessory muscle use, crackles heard throughout all lung fields.  Mild to moderate respiratory distress.  CV: Rate as above  Abd: Soft, non tender, no guarding, no rebound. Non distended  MSK: no joint effusion.  No evidence of trauma  Skin: No rash  Neuro: Clear speech and no facial droop.  Psych: Not RIS, no e/o AH/VH      Emergency Department Course   ECG  ECG taken at 1633  Sinus tachycardia. Voltage criteria for left ventricular hypertrophy. Abnormal ECG  Rate 103 bpm. AK interval 134 ms. QRS duration 74 ms. QT/QTc 326/427 ms. P-R-T axes 28 7 -32.     Imaging:  CT Chest Pulmonary Embolism w Contrast   Final Result   IMPRESSION:   1.  No evidence for pulmonary emboli.   2.  Chronic interstitial fibrotic lung disease. No acute superimposed infiltrates.   3.  Stable enlarged mediastinal and hilar nodes.   4.  Atherosclerotic disease.        Report per radiology    Laboratory:  Labs Ordered and Resulted from Time of ED Arrival to Time of ED Departure   INFLUENZA A/B & SARS-COV2 PCR MULTIPLEX - Abnormal       Result Value    Influenza A PCR Negative      Influenza B PCR Negative      SARS CoV2 PCR Positive (*)    COMPREHENSIVE METABOLIC PANEL - Abnormal    Sodium 134      Potassium 4.1      Chloride 98      Carbon Dioxide (CO2) 31      Anion Gap 5      Urea Nitrogen 14      Creatinine 0.81      Calcium 10.0      Glucose 104 (*)     Alkaline Phosphatase 91      AST 54 (*)     ALT 75 (*)     Protein Total 7.8      Albumin 3.2 " (*)     Bilirubin Total 0.5      GFR Estimate >90     CBC WITH PLATELETS AND DIFFERENTIAL - Abnormal    WBC Count 10.2      RBC Count 4.42      Hemoglobin 13.3      Hematocrit 42.2      MCV 96      MCH 30.1      MCHC 31.5      RDW 12.7      Platelet Count 283      % Neutrophils 82      % Lymphocytes 5      % Monocytes 9      % Eosinophils 4      % Basophils 0      % Immature Granulocytes 0      NRBCs per 100 WBC 0      Absolute Neutrophils 8.3      Absolute Lymphocytes 0.5 (*)     Absolute Monocytes 0.9      Absolute Eosinophils 0.4      Absolute Basophils 0.0      Absolute Immature Granulocytes 0.0      Absolute NRBCs 0.0     BLOOD GAS VENOUS WITH OXYHEMOGLOBIN - Abnormal    pH Venous 7.40      pCO2 Venous 56 (*)     pO2 Venous 39      Bicarbonate Venous 35 (*)     FIO2 100      Oxyhemoglobin Venous 69 (*)     Base Excess/Deficit (+/-) 8.1 (*)    TROPONIN I - Normal    Troponin I High Sensitivity 26     NT PROBNP INPATIENT - Normal    N terminal Pro BNP Inpatient 834     LACTIC ACID WHOLE BLOOD - Normal    Lactic Acid 0.9          Emergency Department Course:             Reviewed:  I reviewed nursing notes, vitals, past medical history, Care Everywhere and [facility]    Assessments:  1706 I obtained history and examined the patient as noted above.    I rechecked the patient and explained findings.     Consults:  1904 I spoke with Dr. Osei, hospitalist, who agreed to accept the patient.     Interventions:  1725 Ativan 1.5 mg IV  1926 Decadron 6 mg IV    Disposition:  The patient was admitted to the hospital under the care of Dr. Osei.     Impression & Plan     Medical Decision Making:  This is a very pleasant and slightly anxious 71-year-old male, fully vaccinated against COVID, who presents emergency room with 1 week of shortness of breath.  He is Covid positive here and that certainly does explain his increasing oxygen needs.  He also has a history of interstitial lung disease, and desaturates quite aggressively  here on room air or when he is ambulating, and I do nothing that he stable to go home.  I have given Decadron, and unfortunately he is out of the window for Remdesivir.  CT scan does not show any bacterial component, no indication for antibiotics at this time.  No pulmonary embolism thankfully, will plan for supportive care, Decadron, and admission as above.  I spoke to Dr. Osei of the hospitalist team who kindly agreed except care of the patient.    Critical Care Time: was 35 minutes for this patient excluding procedures    Diagnosis:    ICD-10-CM    1. Infection due to 2019 novel coronavirus  U07.1    2. Hypoxia  R09.02          Scribe Disclosure:  I, Maribel Moreno, am serving as a scribe at 5:04 PM on 2/7/2022 to document services personally performed by Vishnu Rivas MD based on my observations and the provider's statements to me.              Vishnu Rivas MD  02/08/22 0007

## 2022-02-07 NOTE — TELEPHONE ENCOUNTER
PA Initiation    Medication: Ofev PA pending  Insurance Company: [employer] - Phone [phone] Fax [phone]  Pharmacy Filling the Rx:    Filling Pharmacy Phone:    Filling Pharmacy Fax:    Start Date: 2/7/2022

## 2022-02-08 NOTE — PROGRESS NOTES
RECEIVING UNIT ED HANDOFF REVIEW    ED Nurse Handoff Report was reviewed by: Destiny Javed RN on February 8, 2022 at 7:51 AM

## 2022-02-08 NOTE — ED NOTES
[facility]  ED Nurse Handoff Report    ED Chief complaint: Shortness of Breath      ED Diagnosis:   Final diagnoses:   Infection due to 2019 novel coronavirus   Hypoxia       Code Status: hospitalist to address    Allergies:   Allergies   Allergen Reactions     Ether      Told as a child -      Grass Extracts [Gramineae Pollens] Itching       Patient Story: SOB and tachycardic over last couple days, denies chest pain or cough. Triple vaccinated for COVID. Using home oxygen due to SOB, currently on 3L in ED. Tested positive for COVID today. Has interstitial lung disease- crackles to bilateral lungs.     Chest CT showing: IMPRESSION:  1.  No evidence for pulmonary emboli.  2.  Chronic interstitial fibrotic lung disease. No acute superimposed infiltrates.  3.  Stable enlarged mediastinal and hilar nodes.  4.  Atherosclerotic disease.    Focused Assessment:  See above    Treatments and/or interventions provided: see MAR, decadron and oxygen. Pt requires numbing prior to any lab work or needles due to severe needle phobia.  Patient's response to treatments and/or interventions:     To be done/followed up on inpatient unit:  none at this time    Does this patient have any cognitive concerns?: N/A    Activity level - Baseline/Home:  Independent  Activity Level - Current:   Independent    Patient's Preferred language: English   Needed?: No    Isolation: Other: Special Precautions  Infection:COVID    Patient tested for COVID 19 prior to admission: YES  Bariatric?: No    Vital Signs:   Vitals:    02/07/22 1754 02/07/22 1755 02/07/22 1800 02/07/22 1801   BP:   (!) 156/99    Pulse: 99 101 104 101   Resp: 18 22 25 23   Temp:       TempSrc:       SpO2: 99% 99% 98% 97%   Weight:       Height:           Cardiac Rhythm:Cardiac Rhythm: Sinus tachycardia    Was the PSS-3 completed:   Yes  What interventions are required if any?               Family Comments: patient has been keeping his wife updated via  telephone  OBS brochure/video discussed/provided to patient/family: N/A              Name of person given brochure if not patient:               Relationship to patient:     For the majority of the shift this patient's behavior was Green.   Behavioral interventions performed were .    ED NURSE PHONE NUMBER: [phone]

## 2022-02-08 NOTE — H&P
"[facility]    History and Physical - Hospitalist Service       Date of Admission:  2/7/2022    Assessment & Plan    Josr Jamison is a 71 year old male with a history of interstitial lung disease who has been on as needed supplemental oxygen at home since this past summer but about 5 days ago became increasingly dyspneic and started to use his oxygen continuously at home.  He has had a mild productive cough.  He has had 3 doses of a COVID-19 mRNA vaccine.  Over the past few days he started himself on about 20 mg of prednisone daily.  His pulmonologist had talked him about starting nintedanib but he is not currently on this medication.  In the emergency room he was requiring 3 to 4 L of oxygen by nasal cannula.  He was afebrile.  CT scan showed evidence of pulmonary fibrosis but no acute appearing infiltrates.  COVID-19 testing is positive.  He is being admitted to the hospital for treatment.    Confirmed COVID-19 infection    Acute Hypoxic Respiratory Failure secondary to COVID-19 infection   Interstitial lung disease        Symptom Onset Date used to determine duration of isolation.  If unsure, enter \"unknown\"   Date of 1st Positive Test Use date of first positive test.   Vaccination Status Fully Vaccinated       - COVID-19 special precautions, continuous pulse-ox  - Oxygen: continue current support with nasal cannula at 3 L/min; titrate to keep SpO2 between 90-96%  - Labs: Daily COVID labs ordered x4 days (CBC, BMP, D-dimer, CRP).  Continue to trend after 4 days as needed.   - Imaging: no additional imaging needed at this time  - Breathing treatments: albuterol inhaler four times a day scheduled and PRN; avoid nebulizers in favor of MDIs   - IV fluids: not indicated at this time  - Antibiotics: not indicated -procalcitonin ordered  - COVID-Focused Medications: Dexamethasone 6 mg x 10 days or until hospital discharge, started on 2/7 and Remdesivir x 5 days or until hospital discharge, " started on 2/7  - DVT Prophylaxis: at high risk of thrombotic complications due to COVID-19 (DDimer = N/A ).          - PROPHYLACTIC dosing: lovenox 40mg daily        - consider anticoag on discharge for 30 days & until return to normal mobility    ADD    Continue Ritalin       Diet:   regular   DVT Prophylaxis: Enoxaparin (Lovenox) SQ  Sanchez Catheter: Not present  Central Lines: None  Cardiac Monitoring: None  Code Status:   FULL    Clinically Significant Risk Factors Present on Admission                     Disposition Plan   Expected Discharge:    Anticipated discharge location:  Awaiting care coordination huddle  Delays:           The patient's care was discussed with the Patient.    Castillo Osei MD  Hospitalist Service  [facility]  Securely message with the Vocera Web Console (learn more here)  Text page via [employer] Paging/Directory         ______________________________________________________________________    Chief Complaint   Shortness of breath     History is obtained from the patient, electronic health record and emergency department physician    History of Present Illness   Josr Jamison is a 71 year old male with a history of interstitial lung disease who has been on as needed supplemental oxygen at home since this past summer but about 5 days ago became increasingly dyspneic and started to use his oxygen continuously at home.  He has had a mild productive cough.  He has had 3 doses of a COVID-19 mRNA vaccine.  Over the past few days he started himself on about 20 mg of prednisone daily.  His pulmonologist had talked him about starting nintedanib but he is not currently on this medication.  In the emergency room he was requiring 3 to 4 L of oxygen by nasal cannula.  He was afebrile.  CT scan showed evidence of pulmonary fibrosis but no acute appearing infiltrates.  COVID-19 testing is positive.  He is being admitted to the hospital for treatment.    Review of Systems     The 10 point Review of Systems is negative other than noted in the HPI or here.     Past Medical History    I have reviewed this patient's medical history and updated it with pertinent information if needed.   Past Medical History:   Diagnosis Date     ADD (attention deficit disorder)      ADD (attention deficit disorder)      CARDIOVASCULAR SCREENING; LDL GOAL LESS THAN 130 2/22/2013    [place] Risk Score: 12% (13 Total Points)      Cataract     bilateral     Clavicle fracture      Foot fracture, right      Horseback riding age 27    bucked off horse, torn urethra, catheter for a time     Hyperlipidemia LDL goal <130 2/22/2013    [place] Risk Score: 12% (13 Total Points)      Hypertension goal BP (blood pressure) < 140/90 4/4/2013    Attempting to control with diet and exercise     Lung disease        Past Surgical History   I have reviewed this patient's surgical history and updated it with pertinent information if needed.  Past Surgical History:   Procedure Laterality Date     CATARACT IOL, RT/LT      bilateral     COLONOSCOPY  4/25/2013     CV RIGHT HEART CATH MEASUREMENTS RECORDED N/A 4/21/2021    Procedure: CV RIGHT HEART CATH;  Surgeon: Amador English MD;  Location: [facility] CARDIAC CATH LAB     HERNIORRHAPHY INGUINAL Right 4/7/2016    Procedure: HERNIORRHAPHY INGUINAL;  Surgeon: Andriy Sheehan MD;  Location:  OR     LAPAROSCOPIC HERNIORRHAPHY INGUINAL  5/9/2013    Procedure: LAPAROSCOPIC HERNIORRHAPHY INGUINAL;  Laparoscopic inguinal hernia repair;  Surgeon: Andriy Sheehan MD;  Location:  OR     LASIK  2007     MOUTH SURGERY  In his 20's    [place] Teeth and tooth pulled     OPEN REDUCTION INTERNAL FIXATION CLAVICLE  6/20/2014    Procedure: OPEN REDUCTION INTERNAL FIXATION CLAVICLE;  Surgeon: Manjinder Landon MD;  Location:  OR       Social History   I have reviewed this patient's social history and updated it with pertinent information if needed.  Social History      Tobacco Use     Smoking status: Former Smoker     Packs/day: 1.50     Years: 20.00     Pack years: 30.00     Quit date: 1995     Years since quittin.1     Smokeless tobacco: Never Used   Substance Use Topics     Alcohol use: Yes     Comment: couple times per week - 2-3     Drug use: No       Family History   I have reviewed this patient's family history and updated it with pertinent information if needed.  Family History   Problem Relation Age of Onset     Cancer Mother         Brain   82 or 83     Diabetes Maternal Grandfather      SANDY.A.NATHANAEL. Maternal Grandfather      Hypertension Maternal Grandfather      TAINAAMERVAT. Brother 57        CABG, 3 vessel with valve replacement     Hypertension Brother      Allergies Brother         seasonal     Arthritis Sister      C.A.D. Father         cabg, stents.      Diabetes Other         Uncle - fathers side of family     Asthma No family hx of      Cerebrovascular Disease No family hx of      Breast Cancer No family hx of      Cancer - colorectal No family hx of      Prostate Cancer No family hx of      Alcohol/Drug No family hx of      Alzheimer Disease No family hx of      Anesthesia Reaction No family hx of      Blood Disease No family hx of        Prior to Admission Medications   Prior to Admission Medications   Prescriptions Last Dose Informant Patient Reported? Taking?   B Complex Vitamins (VITAMIN B-COMPLEX) TABS 2022 at Unknown time  Yes Yes   Sig: Take 1 tablet by mouth daily   Fe Fum-FA-B Hhg-K-Hy-Mg-Mn-Cu (HEMATINIC PLUS VIT/MINERALS) 106-1 MG TABS 2022 at Unknown time  Yes Yes   Sig: Take 1 tablet by mouth daily   Vitamin D, Cholecalciferol, 25 MCG (1000 UT) TABS 2022 at Unknown time  Yes Yes   Sig: Take 2,000 Units by mouth daily   albuterol (PROVENTIL) (2.5 MG/3ML) 0.083% neb solution 2022 at Unknown time  Yes Yes   Sig: Take 2.5 mg by nebulization every 4 hours as needed for shortness of breath / dyspnea or wheezing   lidocaine-prilocaine  (EMLA) 2.5-2.5 % external cream as needed  No Yes   Sig: Apply topically as needed for moderate pain   methylphenidate (METADATE ER) 20 MG CR tablet 2/7/2022 at Unknown time  Yes Yes   Sig: Take 20 mg by mouth 4 times daily    metroNIDAZOLE (METROGEL) 0.75 % external gel 2/7/2022 at Unknown time  Yes Yes   Sig: Apply 1 Application topically daily    nintedanib (OFEV) 150 MG capsule hasn't started  No Yes   Sig: Take 1 capsule (150 mg) by mouth 2 times daily      Facility-Administered Medications: None     Allergies   Allergies   Allergen Reactions     Ether      Told as a child -      Grass Extracts [Gramineae Pollens] Itching       Physical Exam   Vital Signs: Temp: 98.7  F (37.1  C) Temp src: Temporal BP: (!) 156/99 Pulse: 101   Resp: 23 SpO2: 97 % O2 Device: None (Room air) Oxygen Delivery: 3 LPM  Weight: 139 lbs 0 oz    Constitutional: awake, alert, cooperative, no apparent distress  Eyes: Lids and lashes normal, pupils equal, round, sclera clear, conjunctiva normal  ENT: Normocephalic, without obvious abnormality, atraumatic  Respiratory: No increased work of breathing, good air exchange, some bilateral rales  Cardiovascular: regular rate and rhythm, normal S1 and S2, no S3 or S4, and no murmur noted  GI:  normal bowel sounds, soft, non-distended, non-tender, no masses palpated  Skin: no rashes and no jaundice  Musculoskeletal: There is no redness, warmth, or swelling of the joints.  Full range of motion noted.  Motor strength is 5 out of 5 all extremities bilaterally.  Tone is normal.  Neurologic: Awake, alert, oriented to name, place and time.  Cranial nerves II-XII are grossly intact.  Motor is 5 out of 5 bilaterally.   Neuropsychiatric: General: normal, calm and normal eye contact    Data   Data reviewed today: I reviewed all medications, new labs and imaging results over the last 24 hours. I personally reviewed the chest CT image(s) showing PE.    Recent Labs   Lab 02/07/22  1741   WBC 10.2   HGB 13.3    MCV 96         POTASSIUM 4.1   CHLORIDE 98   CO2 31   BUN 14   CR 0.81   ANIONGAP 5   AMINATA 10.0   *   ALBUMIN 3.2*   PROTTOTAL 7.8   BILITOTAL 0.5   ALKPHOS 91   ALT 75*   AST 54*     10.2    \    13.3    /    283   N 82    L N/A    134    98    14 /   ------------------------------------ 104 (H)   ALT 75 (H)   AST 54 (H)   AP 91   ALB 3.2 (L)   Ca 10.0  4.1    31    0.81 \    % RETIC N/A    LDH N/A  Troponin N/A    BNP N/A    CK N/A  INR N/A   PTT N/A    D-dimer N/A    Fibrinogen N/A    Antithrombin N/A  Ferritin N/A  CRP N/A    IL-6 N/A  Recent Results (from the past 24 hour(s))   CT Chest Pulmonary Embolism w Contrast    Narrative    EXAM: CT CHEST PULMONARY EMBOLISM W CONTRAST  LOCATION: [facility]  DATE/TIME: 2/7/2022 6:22 PM    INDICATION: Shortness of breath  COMPARISON: CTA chest exam 01/18/2022  TECHNIQUE: CT chest pulmonary angiogram during arterial phase injection of IV contrast. Multiplanar reformats and MIP reconstructions were performed. Dose reduction techniques were used.   CONTRAST: 58ml isovue 370    FINDINGS:  ANGIOGRAM CHEST: Pulmonary arteries are normal caliber and negative for pulmonary emboli. Thoracic aorta is negative for dissection. No CT evidence of right heart strain.    LUNGS AND PLEURA: Advanced interstitial fibrotic lung disease. Bronchiectasis both upper and lower lobes. No effusions. No superimposed acute infiltrates.    MEDIASTINUM/AXILLAE: Several enlarged mediastinal and bilateral hilar lymph nodes, similar to the prior study.    CORONARY ARTERY CALCIFICATION: Severe.    UPPER ABDOMEN: Atherosclerotic disease abdominal aorta. Plaque and stenosis at the origin of the celiac trunk with poststenotic dilatation.    MUSCULOSKELETAL: Normal.      Impression    IMPRESSION:  1.  No evidence for pulmonary emboli.  2.  Chronic interstitial fibrotic lung disease. No acute superimposed infiltrates.  3.  Stable enlarged mediastinal and hilar  nodes.  4.  Atherosclerotic disease.

## 2022-02-08 NOTE — PHARMACY-ADMISSION MEDICATION HISTORY
Pharmacy Medication History  Admission medication history interview status for the 2/7/2022  admission is complete. See EPIC admission navigator for prior to admission medications     Location of Interview: Phone  Medication history sources: Patient and Surescripts    In the past week, patient estimated taking medication this percent of the time: greater than 90%    Additional medication history information:   There are multiple other OTC supplements the patient takes, this is the partial list he supplied.    Medication reconciliation completed by provider prior to medication history? No    Time spent in this activity: 30 minutes    Prior to Admission medications    Medication Sig Last Dose Taking? Auth Provider   albuterol (PROVENTIL) (2.5 MG/3ML) 0.083% neb solution Take 2.5 mg by nebulization every 4 hours as needed for shortness of breath / dyspnea or wheezing 2/7/2022 at Unknown time Yes Unknown, Entered By History   B Complex Vitamins (VITAMIN B-COMPLEX) TABS Take 1 tablet by mouth daily 2/7/2022 at Unknown time Yes Reported, Patient   Fe Fum-FA-B Tdf-P-Ct-Mg-Mn-Cu (HEMATINIC PLUS VIT/MINERALS) 106-1 MG TABS Take 1 tablet by mouth daily 2/7/2022 at Unknown time Yes Reported, Patient   lidocaine-prilocaine (EMLA) 2.5-2.5 % external cream Apply topically as needed for moderate pain as needed Yes Sandie Velarde PA-C   methylphenidate (METADATE ER) 20 MG CR tablet Take 20 mg by mouth 4 times daily  2/7/2022 at Unknown time Yes Sandie Velarde PA-C   metroNIDAZOLE (METROGEL) 0.75 % external gel Apply 1 Application topically daily  2/7/2022 at Unknown time Yes Reported, Patient   nintedanib (OFEV) 150 MG capsule Take 1 capsule (150 mg) by mouth 2 times daily hasn't started Yes Wei Hammonds MD   UNABLE TO FIND Gummy form of Tumeric, Electrolyte (Magnesium, Calcium,Zinc). Calm gummy at bedtime, Lung protocol from [employer].  Yes Unknown, Entered By History   Vitamin D, Cholecalciferol, 25 MCG  (1000 UT) TABS Take 2,000 Units by mouth daily 2/7/2022 at Unknown time Yes Unknown, Entered By History   zinc sulfate (ZINCATE) 220 (50 Zn) MG capsule Take 220 mg by mouth daily 2/7/2022 at Unknown time Yes Unknown, Entered By History       The information provided in this note is only as accurate as the sources available at the time of update(s)

## 2022-02-08 NOTE — PLAN OF CARE
DATE & TIME: 2/8/22 3137-1496  Cognitive Concerns/ Orientation : A&Ox4  BEHAVIOR & AGGRESSION TOOL COLOR: Green   CIWA SCORE: n/a   ABNL VS/O2: VSS on 3 L nc. (baseline oxygen use) YEBOAH, mild inspiratory wheezing. Infrequent, productive cough  MOBILITY: Independent  PAIN MANAGMENT: Denies   DIET: Vegetarian   BOWEL/BLADDER: Continent of bowel and bladder   ABNL LAB/BG: CRP 31.2, procal 0.06, D-dimer 0.32 (Chest CT neg for PE), VBG's on admit - compensated resp acidosis   DRAIN/DEVICES: IVSL   TELEMETRY RHYTHM: n/a   SKIN: Intact   TESTS/PROCEDURES: n/a   D/C DAY/GOALS/PLACE: pending   OTHER IMPORTANT INFO: Remdesivir started on 2/7

## 2022-02-08 NOTE — PROGRESS NOTES
[facility]    Medicine Progress Note - Hospitalist Service       Date of Admission:  2/7/2022  Assessment & Plan   Josr Jamison is a 71 year-old male with a history of interstitial lung disease on as needed supplemental oxygen at home, who presents with increasing dyspnea and hypoxia noted at home. Admitted 2/7/2022.       ho has been on as needed supplemental oxygen at home since this past summer but about 5 days ago became increasingly dyspneic and started to use his oxygen continuously at home.  He has had a mild productive cough.  He has had 3 doses of a COVID-19 mRNA vaccine.    He was afebrile.  CT scan showed evidence of pulmonary fibrosis but no acute appearing infiltrates.  COVID-19 testing is positive.  He is being admitted to the hospital for treatment.     Confirmed COVID-19 infection    Acute Hypoxic Respiratory Failure secondary to COVID-19 infection   Interstitial lung disease   *Presents with increasing dyspnea, SPO2 into the mid 80s on home oximeter  *Prior to admission on oxygen PRN  *Over the past few days he started himself on about 20 mg of prednisone daily.  His pulmonologist had talked him about starting nintedanib but he is not currently on this medication.   *In the emergency room he was requiring 3 to 4 L of oxygen by nasal cannula.    Symptom Onset  1/31/22   Date of 1st Positive Test  2/7/22   Vaccination Status Fully Vaccinated + boosted          - COVID-19 special precautions, continuous pulse-ox  - Oxygen: Continues on nasal cannula at 3-4 L/min; titrate to keep SpO2 between 90-96%  - Labs: Daily COVID labs ordered x4 days (CBC, BMP, D-dimer, CRP).  Continue to trend after 4 days as needed.   - Imaging: CT PE protocol 2/7/22 negative for PE, chronic interstitial fibrotic lung disease with no acute superimposed infiltrates  - Breathing treatments: albuterol inhaler four times a day scheduled and PRN; avoid nebulizers in favor of MDIs   - IV fluids: not  indicated at this time  - Antibiotics: Not indicated; procalcitonin 0.06 and no acute infiltrates on CT  - COVID-Focused Medications: Dexamethasone 6 mg x 10 days or until hospital discharge, started on 2/7 and Remdesivir x 5 days or until hospital discharge, started on 2/7  - DVT Prophylaxis: at high risk of thrombotic complications due to COVID-19 (DDimer = N/A ).          - PROPHYLACTIC dosing: enoxaparin 40mg daily        - consider anticoag on discharge for 30 days & until return to normal mobility     ADD  - Continue prior to admission methylphenidate      Clinically Significant Risk Factors Present on Admission                        Diet: Vegetarian Diet    DVT Prophylaxis: Enoxaparin subcutaneous   Sanchez Catheter: Not present  Code Status: Full Code      Disposition Plan   Expected Discharge: Anticipate discharge in 1 to 2 days once oxygen needs improving/stable    Entered: Gómez Rowland MD 02/08/2022, 10:26 AM       The patient's care was discussed with the patient    Gómez Rowland MD  Hospitalist Service  [facility]    ______________________________________________________________________    Interval History   No acute events overnight. Reports his breathing feels improved compared to admission. Denies any chest pain, abdominal pain, nausea, vomiting.    Data reviewed today: I reviewed all medications, new labs and imaging results over the last 24 hours. I personally reviewed no images or EKG's today.    Physical Exam   Vital Signs: Temp: 98.3  F (36.8  C) Temp src: Oral BP: 119/88 Pulse: 66   Resp: 21 SpO2: 99 % O2 Device: Nasal cannula Oxygen Delivery: 4 LPM  Weight: 139 lbs 0 oz    Constitutional: Well-appearing, NAD  Respiratory: Normal respiratory effort at rest, non-labored breathing on nasal cannula   Cardiovascular: RRR. No peripheral edema.  GI: Soft, non-tender, non-distended.   Skin/Integumen: Warm, dry  Other:      Data   Recent Labs   Lab 02/08/22  0602  02/07/22  1741   WBC 7.5 10.2   HGB 11.7* 13.3   MCV 97 96    283    134   POTASSIUM 4.3 4.1   CHLORIDE 103 98   CO2 32 31   BUN 15 14   CR 0.68 0.81   ANIONGAP 1* 5   AMINATA 8.9 10.0   GLC 90 104*   ALBUMIN  --  3.2*   PROTTOTAL  --  7.8   BILITOTAL  --  0.5   ALKPHOS  --  91   ALT  --  75*   AST  --  54*       Recent Results (from the past 24 hour(s))   CT Chest Pulmonary Embolism w Contrast    Narrative    EXAM: CT CHEST PULMONARY EMBOLISM W CONTRAST  LOCATION: [facility]  DATE/TIME: 2/7/2022 6:22 PM    INDICATION: Shortness of breath  COMPARISON: CTA chest exam 01/18/2022  TECHNIQUE: CT chest pulmonary angiogram during arterial phase injection of IV contrast. Multiplanar reformats and MIP reconstructions were performed. Dose reduction techniques were used.   CONTRAST: 58ml isovue 370    FINDINGS:  ANGIOGRAM CHEST: Pulmonary arteries are normal caliber and negative for pulmonary emboli. Thoracic aorta is negative for dissection. No CT evidence of right heart strain.    LUNGS AND PLEURA: Advanced interstitial fibrotic lung disease. Bronchiectasis both upper and lower lobes. No effusions. No superimposed acute infiltrates.    MEDIASTINUM/AXILLAE: Several enlarged mediastinal and bilateral hilar lymph nodes, similar to the prior study.    CORONARY ARTERY CALCIFICATION: Severe.    UPPER ABDOMEN: Atherosclerotic disease abdominal aorta. Plaque and stenosis at the origin of the celiac trunk with poststenotic dilatation.    MUSCULOSKELETAL: Normal.      Impression    IMPRESSION:  1.  No evidence for pulmonary emboli.  2.  Chronic interstitial fibrotic lung disease. No acute superimposed infiltrates.  3.  Stable enlarged mediastinal and hilar nodes.  4.  Atherosclerotic disease.     Medications     - MEDICATION INSTRUCTIONS -         sodium chloride 0.9%  50 mL Intravenous Once     remdesivir  100 mg Intravenous Q24H    And     sodium chloride 0.9%  50 mL Intravenous Q24H      acetaminophen  975 mg Oral TID     dexamethasone  6 mg Oral Daily     enoxaparin ANTICOAGULANT  40 mg Subcutaneous QPM     methylphenidate  20 mg Oral 4x Daily     polyethylene glycol  17 g Oral Daily     sodium chloride (PF)  3 mL Intracatheter Q8H

## 2022-02-09 NOTE — PLAN OF CARE
DATE & TIME: 2/8/22 1900-0730  Cognitive Concerns/ Orientation : A&Ox4  BEHAVIOR & AGGRESSION TOOL COLOR: Green   CIWA SCORE: n/a   ABNL VS/O2: VSS on 3 L nc. (baseline oxygen use) YEBOAH, mild inspiratory wheezing. Infrequent, productive cough  MOBILITY: Independent  PAIN MANAGMENT: Denies   DIET: Vegetarian   BOWEL/BLADDER: Continent of bowel and bladder   ABNL LAB/BG: CRP 31.2, procal 0.06, D-dimer 0.32 (Chest CT neg for PE), VBG's on admit - compensated resp acidosis   DRAIN/DEVICES: IVSL   TELEMETRY RHYTHM: n/a   SKIN: Intact   TESTS/PROCEDURES: n/a   D/C DAY/GOALS/PLACE: pending   OTHER IMPORTANT INFO: Remdesivir started on 2/7

## 2022-02-09 NOTE — PROGRESS NOTES
[facility]    Hospitalist Progress Note    Date of Service (when I saw the patient): 02/09/2022  Admit date: 2/7/2022    Assessment & Plan   Josr Jamison is a 71 year-old male with a history of interstitial lung disease on as needed supplemental oxygen at home, who presents with increasing dyspnea and hypoxia noted at home. Admitted 2/7/2022. He has been on as needed supplemental oxygen at home since this past summer. About 5 days ago, he became increasingly dyspneic and started to use his oxygen continuously at home.  He has had a mild productive cough.  He has had 3 doses of a COVID-19 mRNA vaccine.    He was afebrile.  CT scan showed evidence of pulmonary fibrosis but no acute appearing infiltrates. COVID-19 testing is positive.  He is being admitted to the hospital for treatment.     Confirmed COVID-19 infection    Acute Hypoxic Respiratory Failure secondary to COVID-19 infection   Interstitial lung disease   *Presents with increasing dyspnea, SPO2 into the mid 80s on home oximeter  *Prior to admission on oxygen PRN  *Over the past few days he started himself on about 20 mg of prednisone daily.  His pulmonologist had talked him about starting nintedanib but he is not currently on this medication.   *In the emergency room he was requiring 3 to 4 L of oxygen by nasal cannula.     Symptom Onset  1/31/22   Date of 1st Positive Test  2/7/22   Vaccination Status Fully Vaccinated + boosted          - COVID-19 special precautions, continuous pulse-ox  - Oxygen: Continues on nasal cannula at 3-4 L/min; titrate to keep SpO2 between 90-96%  - Labs: Daily COVID labs ordered x4 days (CBC, BMP, D-dimer, CRP).  Continue to trend after 4 days as needed.   - Imaging: CT PE protocol 2/7/22 negative for PE, chronic interstitial fibrotic lung disease with no acute superimposed infiltrates  - Breathing treatments: albuterol inhaler four times a day scheduled and PRN; avoid nebulizers in favor of MDIs   -  IV fluids: not indicated at this time  - Antibiotics: Not indicated; procalcitonin 0.06 and no acute infiltrates on CT  - COVID-Focused Medications: Dexamethasone 6 mg x 10 days or until hospital discharge, started on 2/7 and Remdesivir x 5 days or until hospital discharge, started on 2/7  - DVT Prophylaxis: at high risk of thrombotic complications due to COVID-19 (DDimer = N/A ).          - PROPHYLACTIC dosing: enoxaparin 40mg daily        - consider anticoag on discharge for 30 days & until return to normal mobility    Anxiety with tachycardia - Patient feels very anxious with breathing and notes HR in the low 100s. He states he had similar anxiety in the ED and resolved with ativan.   - We discussed ativan as needed and during our discussion of anticipated hospital course, he relaxed and HR went back down in the 70s.   - ativan available PRN.     ADD  - Continue prior to admission methylphenidate      Diet: Orders Placed This Encounter      Vegetarian Diet     IVF: None   Sanchez Catheter: Not present     DVT Prophylaxis: Enoxaparin (Lovenox) SQ  Code Status: Full Code     Disposition: Expected discharge in the next 1-2 days once breathing near baseline or off remdesivir  Communication: Discussed with patient on 02/09/22    Cathy Suazo  Hospitalist Service  [facility]  Securely message with the Vocera Web Console (learn more here)  Text page via [email or web address] Paging/Directory    Interval History   Events over last 24 hours as outlined above.   Patient describes anxiety with associated tachycardia as above. Breathing still below baseline and requiring oxygen. Does not feel ready for discharge yet.   SOB with exertion minimally improved. Denies CP, abdominal pain, N/V/D.     -Data reviewed today: I reviewed all new labs and imaging results over the last 24 hours. I personally reviewed no images or EKG's today.    Physical Exam   Temp: 97.6  F (36.4  C) Temp src: Oral BP: 120/79 Pulse: 57   Resp:  20 SpO2: 93 % O2 Device: Nasal cannula Oxygen Delivery: 3 LPM  Vitals:    02/07/22 1625 02/08/22 0906   Weight: 63 kg (139 lb) 63 kg (138 lb 14.2 oz)     Vital Signs with Ranges  Temp:  [97.6  F (36.4  C)-99  F (37.2  C)] 97.6  F (36.4  C)  Pulse:  [57-90] 57  Resp:  [20-22] 20  BP: (105-124)/(67-88) 120/79  SpO2:  [92 %-99 %] 93 %  I/O last 3 completed shifts:  In: 360 [P.O.:360]  Out: -     Today's Exam  Constitutional: NAD,   Neuropsyche: Very friendly and pleasant, alert and oriented, answers questions appropriately.   Respiratory:  Breathing comfortably, good air exchange, no wheezes, no crackles.   Cardiovascular:  Regular rate and rhythm, no edema.  GI:  soft, NT/ND, BS normal  Skin/Integumen:  No acute rash or sign of bleeding.     Medications   All medications reviewed on 02/09/22      - MEDICATION INSTRUCTIONS -         sodium chloride 0.9%  50 mL Intravenous Once     remdesivir  100 mg Intravenous Q24H    And     sodium chloride 0.9%  50 mL Intravenous Q24H     acetaminophen  975 mg Oral TID     dexamethasone  6 mg Oral Daily     enoxaparin ANTICOAGULANT  40 mg Subcutaneous QPM     methylphenidate  20 mg Oral 4x Daily     polyethylene glycol  17 g Oral Daily     sodium chloride (PF)  3 mL Intracatheter Q8H       Data   Recent Labs   Lab 02/08/22  0602 02/07/22  1741   WBC 7.5 10.2   HGB 11.7* 13.3   MCV 97 96    283    134   POTASSIUM 4.3 4.1   CHLORIDE 103 98   CO2 32 31   BUN 15 14   CR 0.68 0.81   ANIONGAP 1* 5   AMINATA 8.9 10.0   GLC 90 104*   ALBUMIN  --  3.2*   PROTTOTAL  --  7.8   BILITOTAL  --  0.5   ALKPHOS  --  91   ALT  --  75*   AST  --  54*       No results found for this or any previous visit (from the past 24 hour(s)).

## 2022-02-10 NOTE — PROGRESS NOTES
[facility]    Hospitalist Progress Note    Date of Service (when I saw the patient): 02/10/2022  Admit date: 2/7/2022    Assessment & Plan   Josr Jamison is a 71 year-old male with a history of interstitial lung disease on as needed supplemental oxygen at home, who presents with increasing dyspnea and hypoxia noted at home. Admitted 2/7/2022. He has been on as needed supplemental oxygen at home since this past summer. About 5 days ago, he became increasingly dyspneic and started to use his oxygen continuously at home.  He has had a mild productive cough.  He has had 3 doses of a COVID-19 mRNA vaccine.    He was afebrile.  CT scan showed evidence of pulmonary fibrosis but no acute appearing infiltrates. COVID-19 testing is positive.  He is being admitted to the hospital for treatment.     Confirmed COVID-19 infection    Acute Hypoxic Respiratory Failure secondary to COVID-19 infection   Interstitial lung disease   *Presents with increasing dyspnea, SPO2 into the mid 80s on home oximeter  *Prior to admission on oxygen PRN  *Over the past few days he started himself on about 20 mg of prednisone daily.  His pulmonologist had talked him about starting nintedanib but he is not currently on this medication.   *In the emergency room he was requiring 3 to 4 L of oxygen by nasal cannula.     Symptom Onset  1/31/22   Date of 1st Positive Test  2/7/22   Vaccination Status Fully Vaccinated + boosted          - COVID-19 special precautions, continuous pulse-ox  - Oxygen: Continues on nasal cannula at 3-4 L/min; titrate to keep SpO2 between 90-96%  - Labs: Daily COVID labs ordered x4 days (CBC, BMP, D-dimer, CRP).  Continue to trend after 4 days as needed.   - Imaging: CT PE protocol 2/7/22 negative for PE, chronic interstitial fibrotic lung disease with no acute superimposed infiltrates  - Breathing treatments: albuterol inhaler four times a day scheduled and PRN; avoid nebulizers in favor of MDIs   -  IV fluids: not indicated at this time  - Antibiotics: Not indicated; procalcitonin 0.06 and no acute infiltrates on CT  - COVID-Focused Medications: Dexamethasone 6 mg x 10 days or until hospital discharge, started on 2/7 and Remdesivir x 5 days or until hospital discharge, started on 2/7  - DVT Prophylaxis: enoxaparin 40mg daily    Recent Labs   Lab 02/10/22  1047 02/09/22  0952 02/08/22  0602 02/07/22  2324 02/07/22  1745 02/07/22  1744 02/07/22  1741   WBC 11.4* 7.2 7.5  --   --   --  10.2   CRP 17.6* 21.6* 31.2*  --   --   --  29.9*   DD 0.30 <0.27 0.32  --  <0.27  --   --    LACT  --   --   --   --   --  0.9  --    PCAL  --   --   --  0.06*  --   --   --          Anxiety with tachycardia, RESOLVED - Patient feels very anxious with breathing and notes HR in the low 100s. He states he had similar anxiety in the ED and resolved with ativan.   - We discussed ativan as needed and during our discussion of anticipated hospital course, he relaxed and HR went back down in the 70s.   - ativan available PRN.     ADD  - Continue prior to admission methylphenidate      Diet: Orders Placed This Encounter      Vegetarian Diet     IVF: None   Sanchez Catheter: Not present     DVT Prophylaxis: Enoxaparin (Lovenox) SQ  Code Status: Full Code     Disposition: Expected discharge in the next 1-2 days once breathing near baseline or off remdesivir  Communication: Discussed with patient on 02/09/22    Cathy Suazo  Hospitalist Service  [facility]  Securely message with the Vocera Web Console (learn more here)  Text page via [place] Paging/Directory    Interval History   Events over last 24 hours as outlined above.   Overall feeling better, but when he gets up and walks feels very winded which is not his baseline. He continue to require 2-3 L of O2. Would like to complete course of Remdesivir.   Denies CP, abdominal pain, N/V/D.     -Data reviewed today: I reviewed all new labs and imaging results over the  last 24 hours. I personally reviewed no images or EKG's today.    Physical Exam   Temp: 98.4  F (36.9  C) Temp src: Oral BP: 118/65 Pulse: 58   Resp: 20 SpO2: 94 % O2 Device: Nasal cannula Oxygen Delivery: 3 LPM  Vitals:    02/07/22 1625 02/08/22 0906   Weight: 63 kg (139 lb) 63 kg (138 lb 14.2 oz)     Vital Signs with Ranges  Temp:  [97.9  F (36.6  C)-98.4  F (36.9  C)] 98.4  F (36.9  C)  Pulse:  [53-93] 58  Resp:  [20] 20  BP: (108-135)/(60-86) 118/65  SpO2:  [93 %-100 %] 94 %  No intake/output data recorded.    Today's Exam  Constitutional: NAD,   Neuropsyche: Very friendly and pleasant, alert and oriented, answers questions appropriately.   Respiratory:  Breathing comfortably, good air exchange, no wheezes, crackles just at the bases.    Cardiovascular:  Regular rate and rhythm, no edema.  GI:  soft, NT/ND, BS normal  Skin/Integumen:  No acute rash or sign of bleeding.     Medications   All medications reviewed on 02/10/22    - MEDICATION INSTRUCTIONS -         sodium chloride 0.9%  50 mL Intravenous Once     acetaminophen  975 mg Oral TID     dexamethasone  6 mg Oral Daily     enoxaparin ANTICOAGULANT  40 mg Subcutaneous QPM     methylphenidate  20 mg Oral 4x Daily     polyethylene glycol  17 g Oral Daily     remdesivir  100 mg Intravenous Q24H     sodium chloride (PF)  3 mL Intracatheter Q8H       Data   Recent Labs   Lab 02/10/22  1047 02/09/22  0952 02/08/22  0602 02/07/22  1741   WBC 11.4* 7.2 7.5 10.2   HGB 13.4 12.9* 11.7* 13.3   MCV 92 94 97 96    281 272 283   * 133 136 134   POTASSIUM 3.5 3.4 4.3 4.1   CHLORIDE 96 98 103 98   CO2 32 32 32 31   BUN 20 12 15 14   CR 0.63* 0.67 0.68 0.81   ANIONGAP 4 3 1* 5   AMINATA 9.1 9.2 8.9 10.0   GLC 87 93 90 104*   ALBUMIN  --   --   --  3.2*   PROTTOTAL  --   --   --  7.8   BILITOTAL  --   --   --  0.5   ALKPHOS  --   --   --  91   ALT  --   --   --  75*   AST  --   --   --  54*       No results found for this or any previous visit (from the past 24  hour(s)).

## 2022-02-10 NOTE — PLAN OF CARE
DATE & TIME: 2/9/22 0260-8731  Cognitive Concerns/ Orientation : A&Ox4  BEHAVIOR & AGGRESSION TOOL COLOR: Green   CIWA SCORE: NA  ABNL VS/O2: VSS; YEBOAH; Infrequent, productive cough; continues to desat to 82% with activity and coughing and takes about 2 minutes to recover - otherwise, O2sats 90's 3L per NC (baseline)  MOBILITY: Independent  PAIN MANAGMENT: Denies   DIET: Vegetarian   BOWEL/BLADDER: Continent of bowel and bladder   ABNL LAB/BG: CRP 21.6; D-dimer 0.27 - both improving  DRAIN/DEVICES: PIV SL   TELEMETRY RHYTHM: NA   SKIN: Intact   TESTS/PROCEDURES: NA  D/C DAY/GOALS/PLACE: Expected discharge in the next 1-2 days once breathing near baseline or off remdesivir  OTHER IMPORTANT INFO: Covid (+) on 2/7 - Remdesivir started on 2/7

## 2022-02-11 NOTE — PLAN OF CARE
DATE & TIME: 2/11/22 0390-1461  Cognitive Concerns/ Orientation : A&Ox4  BEHAVIOR & AGGRESSION TOOL COLOR: Green   CIWA SCORE: NA  ABNL VS/O2: VSS; YEBOAH; Infrequent, productive cough; attempted to wean down to 2L but still desats to 88% with activity and coughing but quickly recovers to 90's (uses 3L prn at baseline)   MOBILITY: Independent  PAIN MANAGMENT: Denies   DIET: Vegetarian - appetite is good  BOWEL/BLADDER: Continent   ABNL LAB/BG: CRP 12 - improving  DRAIN/DEVICES: PIV SL   TELEMETRY RHYTHM: NA   SKIN: Intact   TESTS/PROCEDURES: NA  D/C DAY/GOALS/PLACE: Expected discharge tomorrow 2/12 to home  OTHER IMPORTANT INFO: Covid (+) on 2/7 - Remdesivir started on 2/7 - last dose 2/11.

## 2022-02-11 NOTE — PLAN OF CARE
DATE & TIME: 2/10/22-2/11/22 1900-0730  Cognitive Concerns/ Orientation : A&Ox4  BEHAVIOR & AGGRESSION TOOL COLOR: Green   CIWA SCORE: NA  ABNL VS/O2: VSS; YEBOAH; de-sats to mid 80s when up to BR but recovers quickly to mid 90s on baseline 3L NC.   MOBILITY: Independent, steady  PAIN MANAGMENT: Denies   DIET: Vegetarian   BOWEL/BLADDER: Continent of bowel and bladder   ABNL LAB/BG: N/A  DRAIN/DEVICES: PIV SL   TELEMETRY RHYTHM: N/A   SKIN: Intact   TESTS/PROCEDURES: N/A  D/C DAY/GOALS/PLACE: Expected discharge in the next 1-2 days once breathing near baseline or off remdesivir   OTHER IMPORTANT INFO: Covid (+) on 2/7 - Remdesivir started on 2/7. Refusing lovenox. On scheduled Tylenol but refusing at times.

## 2022-02-11 NOTE — PLAN OF CARE
"DATE & TIME: 2/10/22 9529-1197  Cognitive Concerns/ Orientation : A&Ox4  BEHAVIOR & AGGRESSION TOOL COLOR: Green   CIWA SCORE: NA  ABNL VS/O2: VSS; YEBOAH; Infrequent, productive cough; still desats to 86% with activity and coughing and takes about 2 minutes to recover - increased O2 back to 3L today (uses 3L prn at baseline) to keep O2sats 90's - \"I felt pretty winded when I was shaving and washing my face in the bathroom.\"  MOBILITY: Independent  PAIN MANAGMENT: Denies   DIET: Vegetarian - appetite is good  BOWEL/BLADDER: Continent   ABNL LAB/BG: CRP 21.6 to 17.6; Na+ 132; WBC 7.2 to 11.4  DRAIN/DEVICES: PIV SL   TELEMETRY RHYTHM: NA   SKIN: Intact   TESTS/PROCEDURES: NA  D/C DAY/GOALS/PLACE: Expected discharge in the next 1-2 days once breathing near baseline or off remdesivir   OTHER IMPORTANT INFO: Covid (+) on 2/7 - Remdesivir started on 2/7    "

## 2022-02-11 NOTE — PROGRESS NOTES
[facility]    Hospitalist Progress Note    Date of Service (when I saw the patient): 02/11/2022  Admit date: 2/7/2022    Assessment & Plan   Josr Jamison is a 71 year-old male with a history of interstitial lung disease on as needed supplemental oxygen at home, who presents with increasing dyspnea and hypoxia noted at home. Admitted 2/7/2022. He has been on as needed supplemental oxygen at home since this past summer. About 5 days ago, he became increasingly dyspneic and started to use his oxygen continuously at home.  He has had a mild productive cough.  He has had 3 doses of a COVID-19 mRNA vaccine.    He was afebrile.  CT scan showed evidence of pulmonary fibrosis but no acute appearing infiltrates. COVID-19 testing is positive.  He is being admitted to the hospital for treatment.     Confirmed COVID-19 infection    Acute Hypoxic Respiratory Failure secondary to COVID-19 infection   Interstitial lung disease   *Presents with increasing dyspnea, SPO2 into the mid 80s on home oximeter  *Prior to admission on oxygen PRN  *Over the past few days he started himself on about 20 mg of prednisone daily.  His pulmonologist had talked him about starting nintedanib but he is not currently on this medication.   *In the emergency room he was requiring 3 to 4 L of oxygen by nasal cannula.     Symptom Onset  1/31/22   Date of 1st Positive Test  2/7/22   Vaccination Status Fully Vaccinated + boosted    - COVID-19 special precautions, continuous pulse-ox  - Oxygen: Continues on nasal cannula at 3L; titrate to keep SpO2 between 90-96%  - Labs: Daily COVID labs ordered x4 days (CBC, BMP, D-dimer, CRP).  Continue to trend after 4 days as needed.   - Imaging: CT PE protocol 2/7/22 negative for PE, chronic interstitial fibrotic lung disease with no acute superimposed infiltrates  - Breathing treatments: albuterol inhaler four times a day scheduled and PRN; avoid nebulizers in favor of MDIs   - IV  fluids: not indicated at this time  - Antibiotics: Not indicated; procalcitonin 0.06 and no acute infiltrates on CT  - COVID-Focused Medications: Dexamethasone 6 mg x 10 days started on 2/7   (will continue at discharge given his increased O2 needs and history of pulmonary fibrosis), and Remdesivir x 5 days, last day 02/11/22.   - DVT Prophylaxis: enoxaparin 40mg daily    Recent Labs   Lab 02/11/22  0921 02/10/22  1047 02/09/22  0952 02/08/22  0602 02/07/22  2324 02/07/22  1745 02/07/22  1744 02/07/22  1741   WBC 7.4 11.4* 7.2 7.5  --   --   --  10.2   CRP 12.0* 17.6* 21.6* 31.2*  --   --   --  29.9*   DD <0.27 0.30 <0.27 0.32  --  <0.27  --   --    LACT  --   --   --   --   --   --  0.9  --    PCAL  --   --   --   --  0.06*  --   --   --        Anxiety with tachycardia, RESOLVED - Patient feels very anxious with breathing and notes HR in the low 100s. He states he had similar anxiety in the ED and resolved with ativan.  We discussed ativan as needed and during our discussion of anticipated hospital course, he relaxed and HR went back down in the 70s.   - ativan available PRN.     ADD  - Continue prior to admission methylphenidate      Diet: Orders Placed This Encounter      Vegetarian Diet     IVF: None   Sanchez Catheter: Not present     DVT Prophylaxis: Enoxaparin (Lovenox) SQ  Code Status: Full Code     Disposition: Expected discharge tomorrow AM after completes course of Remdesivir this evening.   Communication: Discussed with patient, pharmacy and RN on 02/11/22     Cathy Suazo  Hospitalist Service  [facility]  Securely message with the Vocera Web Console (learn more here)  Text page via [employer] Paging/Directory    Total time: 25 minutes with more than half that time in one to one counseling with patient.     Interval History   Events over last 24 hours as outlined above.   Feels much better, still requiring O2 above baseline need of just as needed. He would like to complete course of  Remdesivir and discharge in AM.   Denies CP, abdominal pain, N/V/D.     -Data reviewed today: I reviewed all new labs and imaging results over the last 24 hours. I personally reviewed no images or EKG's today.    Physical Exam   Temp: 98.1  F (36.7  C) Temp src: Oral BP: 125/79 Pulse: 92   Resp: 18 SpO2: 94 % O2 Device: Nasal cannula Oxygen Delivery: 3 LPM  Vitals:    02/07/22 1625 02/08/22 0906   Weight: 63 kg (139 lb) 63 kg (138 lb 14.2 oz)     Vital Signs with Ranges  Temp:  [98.1  F (36.7  C)-99.1  F (37.3  C)] 98.1  F (36.7  C)  Pulse:  [61-96] 92  Resp:  [16-18] 18  BP: ()/(62-79) 125/79  SpO2:  [90 %-97 %] 94 %  I/O last 3 completed shifts:  In: 240 [P.O.:240]  Out: -     Today's Exam  Constitutional: NAD,   Neuropsyche: Very friendly and pleasant, alert and oriented, answers questions appropriately.   Respiratory:  Breathing comfortably, good air exchange, no wheezes, crackles just at the bases.    Cardiovascular:  Regular rate and rhythm, no edema.  GI:  soft, NT/ND, BS normal  Skin/Integumen:  No acute rash or sign of bleeding.     Medications   All medications reviewed on 02/10/22    - MEDICATION INSTRUCTIONS -         sodium chloride 0.9%  50 mL Intravenous Once     acetaminophen  975 mg Oral TID     dexamethasone  6 mg Oral Daily     enoxaparin ANTICOAGULANT  40 mg Subcutaneous QPM     methylphenidate  20 mg Oral 4x Daily     polyethylene glycol  17 g Oral Daily     remdesivir  100 mg Intravenous Q24H     sodium chloride (PF)  3 mL Intracatheter Q8H       Data   Recent Labs   Lab 02/11/22  0921 02/10/22  1047 02/09/22  0952 02/08/22  0602 02/07/22  1741   WBC 7.4 11.4* 7.2   < > 10.2   HGB 12.6* 13.4 12.9*   < > 13.3   MCV 95 92 94   < > 96    336 281   < > 283    132* 133   < > 134   POTASSIUM 3.5 3.5 3.4   < > 4.1   CHLORIDE 100 96 98   < > 98   CO2 30 32 32   < > 31   BUN 19 20 12   < > 14   CR 0.55* 0.63* 0.67   < > 0.81   ANIONGAP 4 4 3   < > 5   AMINATA 9.1 9.1 9.2   < > 10.0   GLC  121* 87 93   < > 104*   ALBUMIN  --   --   --   --  3.2*   PROTTOTAL  --   --   --   --  7.8   BILITOTAL  --   --   --   --  0.5   ALKPHOS  --   --   --   --  91   ALT  --   --   --   --  75*   AST  --   --   --   --  54*    < > = values in this interval not displayed.       No results found for this or any previous visit (from the past 24 hour(s)).

## 2022-02-12 NOTE — PLAN OF CARE
DATE & TIME: 2/11/22 3-11pm  Cognitive Concerns/ Orientation : A&Ox4  BEHAVIOR & AGGRESSION TOOL COLOR: Green   CIWA SCORE: NA  ABNL VS/O2: VSS; YEBOAH; Infrequent, productive cough; on 3 L O2(uses 3L prn at baseline)   MOBILITY: Independent  PAIN MANAGMENT: Denies   DIET: Vegetarian - appetite is good  BOWEL/BLADDER: Continent   ABNL LAB/BG: CRP 12 - improving, Cr 0.55  DRAIN/DEVICES: PIV SL   TELEMETRY RHYTHM: NA   SKIN: Intact   TESTS/PROCEDURES: NA  D/C DAY/GOALS/PLACE: Expected discharge tomorrow 2/12 to home  OTHER IMPORTANT INFO: Covid (+) on 2/7 - Remdesivir completed this shift. Patient refused his Lovenox.

## 2022-02-12 NOTE — PLAN OF CARE
DATE & TIME: 2/11/22 2300-0730  Cognitive Concerns/ Orientation : A&Ox4  BEHAVIOR & AGGRESSION TOOL COLOR: Green   CIWA SCORE: NA  ABNL VS/O2: VSS on 3L; Infrequent, productive cough. Denies SOB w/ activity.  MOBILITY: Independent  PAIN MANAGMENT: Denies   DIET: Vegetarian   BOWEL/BLADDER: Continent   ABNL LAB/BG: CRP 12 - improving, Cr 0.55  DRAIN/DEVICES: L PIV SL   TELEMETRY RHYTHM: NA   SKIN: Intact   TESTS/PROCEDURES: NA  D/C DAY/GOALS/PLACE: Expected discharge 2/12 to home  OTHER IMPORTANT INFO: Covid (+) on 2/7 - Remdesivir completed 2/11. Patient refusing his Lovenox.

## 2022-02-12 NOTE — DISCHARGE SUMMARY
[facility]  Hospitalist Discharge Summary      Date of Admission:  2/7/2022  Date of Discharge:  2/12/2022  Discharging Provider: Cathy Suazo MD  Discharge Service: Hospitalist Service    Discharge Diagnoses   Confirmed COVID-19 infection    Acute Hypoxic Respiratory Failure secondary to COVID-19 infection   Interstitial lung disease   Anxiety with tachycardia, RESOLVED   ADD    Follow-ups Needed After Discharge   Follow-up Appointments     Follow-up and recommended labs and tests       Follow up with primary care provider, Elizabeth Kebede, within 7   days for hospital follow- up.    Follow up with your pulmonologist within the month.   Follow up chest imaging in about 12 weeks per pulmonologist's   recommendation.             Unresulted Labs Ordered in the Past 30 Days of this Admission     No orders found from 1/8/2022 to 2/8/2022.          Discharge Disposition   Discharged to home  Condition at discharge: Good      Hospital Course   Josr Jamison is a 71 year-old male with a history of interstitial lung disease on as needed supplemental oxygen at home, who presents with increasing dyspnea and hypoxia noted at home. Admitted 2/7/2022. He has been on as needed supplemental oxygen at home since this past summer. About 5 days ago, he became increasingly dyspneic and started to use his oxygen continuously at home.  He has had a mild productive cough.  He has had 3 doses of a COVID-19 mRNA vaccine.    He was afebrile.  CT scan showed evidence of pulmonary fibrosis but no acute appearing infiltrates. COVID-19 testing is positive.  He is being admitted to the hospital for treatment.     Confirmed COVID-19 infection    Acute Hypoxic Respiratory Failure secondary to COVID-19 infection   Interstitial lung disease   *Presents with increasing dyspnea, SPO2 into the mid 80s on home oximeter  *Prior to admission on oxygen PRN  *Over the past few days he started himself on about 20 mg  of prednisone daily.  His pulmonologist had talked him about starting nintedanib but he is not currently on this medication.   *In the emergency room he was requiring 3 to 4 L of oxygen by nasal cannula.     Symptom Onset  1/31/22   Date of 1st Positive Test  2/7/22   Vaccination Status Fully Vaccinated + boosted    - COVID-19 special precautions, continuous pulse-ox  - Oxygen: Continues on nasal cannula at 3L; titrate to keep SpO2 between 90-96%  - Labs: Daily COVID labs ordered x4 days (CBC, BMP, D-dimer, CRP), see below.   - Imaging: CT PE protocol 2/7/22 negative for PE, chronic interstitial fibrotic lung disease with no acute superimposed infiltrates  - Breathing treatments: albuterol inhaler four times a day scheduled and PRN; avoid nebulizers in favor of MDIs   - Antibiotics: Not indicated; procalcitonin 0.06 and no acute infiltrates on CT  - COVID-Focused Medications: Dexamethasone 6 mg x 10 days started on 2/7   (will continue at discharge given his increased O2 needs and history of pulmonary fibrosis), and Remdesivir x 5 days, last day 02/11/22.   - DVT Prophylaxis: enoxaparin 40mg daily > post-hospitalization DVT prophylaxis not indicated per IMPROVE score and d-dimer.     Recent Labs   Lab 02/11/22  0921 02/10/22  1047 02/09/22  0952 02/08/22  0602 02/07/22  2324 02/07/22  1745 02/07/22  1744 02/07/22  1741   WBC 7.4 11.4* 7.2 7.5  --   --   --  10.2   CRP 12.0* 17.6* 21.6* 31.2*  --   --   --  29.9*   DD <0.27 0.30 <0.27 0.32  --  <0.27  --   --    LACT  --   --   --   --   --   --  0.9  --    PCAL  --   --   --   --  0.06*  --   --   --      Anxiety with tachycardia, RESOLVED - Patient feels very anxious with breathing and notes HR in the low 100s. He states he had similar anxiety in the ED and resolved with ativan. During his stay he used ativan x 2 with good effect. He used judiciously. We discussed the risk with addiction. He requested a short Rx of 3 tablets as he recovers, to help with anxiety  around illness.   - ativan 0.5-1 mg q 8 hours PRN #3 tablets prescribed.      ADD  - Continue prior to admission methylphenidate    Disposition: Expected discharge tomorrow AM after completes course of Remdesivir this evening.   Communication: Discussed with patient and RN 02/12/22     Cathy Suazo  Hospitalist Service  [facility]  Securely message with the Vocera Web Console (learn more here)  Text page via [employer] Paging/Directory      Consultations This Hospital Stay   None    Code Status   Full Code    Time Spent on this Encounter   I, Cathy Suazo MD, personally saw the patient today and spent greater than 30 minutes discharging this patient.       Cathy Suazo MD  [place] [address] [facility]  [address] [place] [address]  Phone: [phone]  ______________________________________________________________________    Physical Exam   Vital Signs: Temp: 98.2  F (36.8  C) Temp src: Oral BP: 111/69 Pulse: 72   Resp: 18 SpO2: 95 % O2 Device: Nasal cannula Oxygen Delivery: 3 LPM  Weight: 138 lbs 14.24 oz  Constitutional:  NAD,   Neuropsyche: Very pleasnt alert and oriented, answers questions appropriately. Speech normal, face symmetric and moving all 4 extremities without gross focal neurological deficit.   Respiratory:  Breathing comfortably, good air exchange, no wheezes, minimal crackles at bases only.   Cardiovascular:  Regular rate and rhythm, no edema.  GI:  soft, NT/ND, BS normal  Skin/Integumen:  No acute rash or sign of bleeding.        Primary Care Physician   Elizabeth Kebede    Discharge Orders      COVID-19 GetWell Loop Referral      Reason for your hospital stay    Acute Covid with hypoxia     Follow-up and recommended labs and tests     Follow up with primary care provider, Elizabeth Kebede, within 7 days for hospital follow- up.    Follow up with your pulmonologist within the month.   Follow up chest imaging in  about 12 weeks per pulmonologist's recommendation.     Activity    Your activity upon discharge: activity as tolerated     Contact provider    Contact your primary care provider if If increased trouble breathing, new arm/leg swelling, dizziness/passing out, falls, bleeding that doesn't stop, or uncontrolled pain.     Discharge - Quarantine/Isolation Instruction    Date of symptom onset: Friday 2/4  Date of first positive test: Monday 2/7  Stay home and away from others (self-isolate) for at least 10 days from when your symptoms started And...   You've had no fevers and no medicine that reduces fever for 1 full day (24 hours)  And...   Your other symptoms have resolved (gotten better).     Oxygen Adult/Peds    Oxygen Documentation:   I certify that this patient, Josr Jamison has been under my care (or a nurse practitioner or physican's assistant working with me). This is the face-to-face encounter for oxygen medical necessity.      Josr Jamison is now in a chronic stable state and continues to require supplemental oxygen. Patient has continued oxygen desaturation due to COVID-19 U07.1  Pulmonary Fibrosis J84.10.    Alternative treatment(s) tried or considered and deemed clinically infective for treatment of COVID-19 U07.1  Pulmonary Fibrosis J84.10 include inhalers, steroids, and pulmonary toileting.  If portability is ordered, is the patient mobile within the home? yes    **Patients who qualify for home O2 coverage under the CMS guidelines require ABG tests or O2 sat readings obtained closest to, but no earlier than 2 days prior to the discharge, as evidence of the need for home oxygen therapy. Testing must be performed while patient is in the chronic stable state. See notes for O2 sats.**     Diet    Vegetarian Diet       Significant Results and Procedures   Most Recent 3 CBC's:Recent Labs   Lab Test 02/11/22  0921 02/10/22  1047 02/09/22  0952   WBC 7.4 11.4* 7.2   HGB 12.6* 13.4 12.9*   MCV 95 92 94   PLT  302 336 281     Most Recent 2 LFT's:Recent Labs   Lab Test 02/07/22  1741 05/02/14  0911   AST 54* 37   ALT 75* 44   ALKPHOS 91 47   BILITOTAL 0.5 0.5     Most Recent 3 Troponin's:No lab results found.  Most Recent 3 BNP's:Recent Labs   Lab Test 02/07/22  1741   NTBNPI 834     Most Recent ESR & CRP:Recent Labs   Lab Test 02/11/22  0921   CRP 12.0*     COVID-19 Antibody Results, Testing for Immunity    COVID-19 Antibody Results, Testing for Immunity   No data to display.         COVID-19 PCR Results    COVID-19 PCR Results 3/15/21 3/15/21 3/15/21 4/18/21 4/18/21 2/7/22    1347 1347 1347 1030 1030    COVID-19 Virus PCR to [facility] - Result Test received-See reflex to IDDL test SARS CoV2 (COVID-19) Virus RT-PCR   Test received-See reflex to IDDL test SARS CoV2 (COVID-19) Virus RT-PCR     COVID-19 Virus PCR to [facility] - Source Nasopharyngeal   Nasopharyngeal     COVID-19 Virus by PCR (External Result)  Not Detected.       SARS-CoV-2 Virus Specimen Source   Nasopharyngeal  Nasopharyngeal    SARS-CoV-2 PCR Result   NEGATIVE  NEGATIVE    SARS CoV2 PCR      Positive (A)   (A) Abnormal value       Comments are available for some flowsheets but are not being displayed.           Results for orders placed or performed during the hospital encounter of 02/07/22   CT Chest Pulmonary Embolism w Contrast    Narrative    EXAM: CT CHEST PULMONARY EMBOLISM W CONTRAST  LOCATION: [facility]  DATE/TIME: 2/7/2022 6:22 PM    INDICATION: Shortness of breath  COMPARISON: CTA chest exam 01/18/2022  TECHNIQUE: CT chest pulmonary angiogram during arterial phase injection of IV contrast. Multiplanar reformats and MIP reconstructions were performed. Dose reduction techniques were used.   CONTRAST: 58ml isovue 370    FINDINGS:  ANGIOGRAM CHEST: Pulmonary arteries are normal caliber and negative for pulmonary emboli. Thoracic aorta is negative for dissection. No CT evidence of right heart strain.    LUNGS AND PLEURA:  Advanced interstitial fibrotic lung disease. Bronchiectasis both upper and lower lobes. No effusions. No superimposed acute infiltrates.    MEDIASTINUM/AXILLAE: Several enlarged mediastinal and bilateral hilar lymph nodes, similar to the prior study.    CORONARY ARTERY CALCIFICATION: Severe.    UPPER ABDOMEN: Atherosclerotic disease abdominal aorta. Plaque and stenosis at the origin of the celiac trunk with poststenotic dilatation.    MUSCULOSKELETAL: Normal.      Impression    IMPRESSION:  1.  No evidence for pulmonary emboli.  2.  Chronic interstitial fibrotic lung disease. No acute superimposed infiltrates.  3.  Stable enlarged mediastinal and hilar nodes.  4.  Atherosclerotic disease.       Discharge Medications   Current Discharge Medication List      START taking these medications    Details   dexamethasone (DECADRON) 6 MG tablet Take 1 tablet (6 mg) by mouth daily for 4 days  Qty: 4 tablet, Refills: 0    Associated Diagnoses: Infection due to 2019 novel coronavirus; Hypoxia; ILD (interstitial lung disease) (H)         CONTINUE these medications which have CHANGED    Details   nintedanib (OFEV) 150 MG capsule Take 1 capsule (150 mg) by mouth 2 times daily  Qty: 60 capsule, Refills: 11    Comments: GIVEN RECENT COVID INFECTION, DISCUSS WITH PULMONOLOGIST PRIOR TO STARTING.  Associated Diagnoses: ILD (interstitial lung disease) (H)         CONTINUE these medications which have NOT CHANGED    Details   albuterol (PROVENTIL) (2.5 MG/3ML) 0.083% neb solution Take 2.5 mg by nebulization every 4 hours as needed for shortness of breath / dyspnea or wheezing      B Complex Vitamins (VITAMIN B-COMPLEX) TABS Take 1 tablet by mouth daily      Fe Fum-FA-B Low-H-Pq-Mg-Mn-Cu (HEMATINIC PLUS VIT/MINERALS) 106-1 MG TABS Take 1 tablet by mouth daily      lidocaine-prilocaine (EMLA) 2.5-2.5 % external cream Apply topically as needed for moderate pain  Qty: 5 g, Refills: 3    Associated Diagnoses: Needle phobia       methylphenidate (METADATE ER) 20 MG CR tablet Take 20 mg by mouth 4 times daily   Qty:        metroNIDAZOLE (METROGEL) 0.75 % external gel Apply 1 Application topically daily       UNABLE TO FIND Gummy form of Tumeric, Electrolyte (Magnesium, Calcium,Zinc). Calm gummy at bedtime, Lung protocol from [employer].      Vitamin D, Cholecalciferol, 25 MCG (1000 UT) TABS Take 2,000 Units by mouth daily      zinc sulfate (ZINCATE) 220 (50 Zn) MG capsule Take 220 mg by mouth daily           Allergies   Allergies   Allergen Reactions     Ether      Told as a child -      Grass Extracts [Gramineae Pollens] Itching

## 2022-02-12 NOTE — PROGRESS NOTES
Patient has been assessed for Home Oxygen needs.     Pulse oximetry (SpO2) and Oxygen flow readings:    SpO2 =  89% on room air at rest while awake.    SpO2 improved to  94% on 3 liters/minute at rest.    SpO2 =  85% on room air during activity/with exercise.    *SpO2 improved to 94% on 3 liters/minute during activity/with exercise.

## 2022-02-12 NOTE — PROGRESS NOTES
Oxygen Documentation:   I certify that this patient, Josr Jamison has been under my care (or a nurse practitioner or physican's assistant working with me). This is the face-to-face encounter for oxygen medical necessity.      Josr Jamison is now in a chronic stable state and continues to require supplemental oxygen. Patient has continued oxygen desaturation due to COVID-19 U07.1  Pulmonary Fibrosis J84.10.    Alternative treatment(s) tried or considered and deemed clinically infective for treatment of COVID-19 U07.1  Pulmonary Fibrosis J84.10 include inhalers, steroids, and pulmonary toileting.  If portability is ordered, is the patient mobile within the home? yes    **Patients who qualify for home O2 coverage under the CMS guidelines require ABG tests or O2 sat readings obtained closest to, but no earlier than 2 days prior to the discharge, as evidence of the need for home oxygen therapy. Testing must be performed while patient is in the chronic stable state. See notes for O2 sats.**

## 2022-02-12 NOTE — PLAN OF CARE
Discharge    Patient discharged to home via wheelchair with self.     Care plan note  DATE & TIME: 02/12/22 [id number]   Cognitive Concerns/ Orientation : A & O x4  BEHAVIOR & AGGRESSION TOOL COLOR: Green  ABNL VS/O2: VSS on 3L nasal cannula.   MOBILITY: IND  PAIN MANAGMENT: Denies  DIET: Vegetarian, tolerating   BOWEL/BLADDER: No issues per pt report. Administered scheduled Miralax.   ABNL LAB/BG: Refused lab draw this am, discharging. Stable yesterday.   DRAIN/DEVICES: PIV SL and removed for discharge.   TELEMETRY RHYTHM: N/A  SKIN: Flushed/reddened face.   TESTS/PROCEDURES: None  D/C DATE: Today  OTHER IMPORTANT INFO: Special Precautions maintained. Doing well, did oxygen testing with and without oxygen needed 3L to maintain. Pt has a very well understanding of how to use oxygen and trying to wean at home. Discharge instructions explained, pt had no questions afterwards.   IS THE PATIENT ON REMDESIVIR? DATE OF LAST SCHEDULED DOSE: Yesterday       Listed belongings gathered and given to patient (including from security/pharmacy). Yes  Care Plan and Patient education resolved: Yes  Prescriptions if needed, hard copies sent with patient  NA  Medication Bin checked and emptied on discharge Yes  SW/care coordinator/charge RN aware of discharge: Yes

## 2022-02-14 NOTE — PROGRESS NOTES
Clinic Care Coordination Contact  [facility]/Voicemail       Clinical Data: Care Coordinator Outreach  Outreach attempted x 1.  Left message on patient's voicemail with call back information and requested return call.  Plan:  Care Coordinator will try to reach patient again in 1-2 business days.    Hermelinda Fritz  Care Transitions Assistant  [facility]

## 2022-02-14 NOTE — TELEPHONE ENCOUNTER
Prior Authorization Approval    Authorization Effective Date: 2/11/2022  Authorization Expiration Date: 12/31/2022  Medication: Ofev PA approved  Approved Dose/Quantity: 150mg / 60 for 30 day supply   Reference #: Key: [id number]   Insurance Company: [employer] - Phone [phone] Fax [phone]  Expected CoPay:     $2982.09   CoPay Card Available:      Foundation Assistance Needed:    Which Pharmacy is filling the prescription (Not needed for infusion/clinic administered): [facility] - [address]  Pharmacy Notified: No  Patient Notified: No    Called [facility] ([phone]) and spoke to rep Mckinley and copay will be $2982.09, transferred to pharmacy benefits rep Ap patient must fill at [facility] rep [place] address is [address] [place] [address], fax [phone], phone for both patient and provider [phone]

## 2022-02-15 NOTE — PROGRESS NOTES
Clinic Care Coordination Contact  [facility]/Voicemail       Clinical Data: Care Coordinator Outreach  Outreach attempted x 2.  Left message on patient's voicemail with call back information and requested return call.  Plan:  Care Coordinator will do no further outreaches at this time.    Hermelinda Fritz  Care Transitions Assistant  [facility]

## 2022-03-15 NOTE — TELEPHONE ENCOUNTER
VM left for patient requesting a CB to pulmonary medicine clinic regarding OFEV initiation. Pharmacy liaison requesting confirmation that patient is not ready to initiate OFEV. Will await CB.    Trey Cao LPN  Pulmonary Medicine:  [facility]  Phone: [phone] Fax: [phone]

## 2022-03-15 NOTE — TELEPHONE ENCOUNTER
Patient sent my chart message confirming that he is not wanting to initiate OFEV at this time. Message has been sent to the pharmacy liaison.    Trey Cao LPN  Pulmonary Medicine:  [facility]  Phone: [phone] Fax: [phone]

## 2022-04-02 PROBLEM — I50.9 ACUTE CONGESTIVE HEART FAILURE, UNSPECIFIED HEART FAILURE TYPE (H): Status: ACTIVE | Noted: 2022-01-01

## 2022-04-02 PROBLEM — J84.9 ILD (INTERSTITIAL LUNG DISEASE) (H): Status: ACTIVE | Noted: 2022-01-01

## 2022-04-02 PROBLEM — I21.4 NSTEMI (NON-ST ELEVATED MYOCARDIAL INFARCTION) (H): Status: ACTIVE | Noted: 2022-01-01

## 2022-04-02 PROBLEM — J96.01 ACUTE RESPIRATORY FAILURE WITH HYPOXIA (H): Status: ACTIVE | Noted: 2022-01-01

## 2022-04-02 PROBLEM — R06.02 SHORTNESS OF BREATH: Status: ACTIVE | Noted: 2022-01-01

## 2022-04-02 NOTE — ED NOTES
[facility]  ED Nurse Handoff Report    ED Chief complaint: Shortness of Breath      ED Diagnosis:   Final diagnoses:   None       Code Status: Full Code    Allergies:   Allergies   Allergen Reactions     Ether      Told as a child -      Grass Extracts [Gramineae Pollens] Itching       Patient Story:   72 year old male with history of interstitial lung disease, hypertension, hyperlipidemia who presents with shortness of breath. The patient reports onset of progressively increasing shortness of breath 6 weeks ago and rapid increase in shortness of breath over the last couple days. He is regularly on 2 L oxygen via nasal cannula at home as needed but has been dropping O2 saturation down to 60-70%. He reports exacerbated shortness of breath when laying flat. He has been using his at home Nebulizers twice daily which hasn't helped. He notes first onset of worsened shortness of breath was 6 weeks ago when he had Covid-19.     Focused Assessment:    Alert and oriented times 3  O2 at 3 liters SpO2 95-97%  Lung sounds clear   Slight productive cough clear  Denies fever   Denies chest pain   Sinus Rhythm     Treatments and/or interventions provided:     Patient's response to treatments and/or interventions:       To be done/followed up on inpatient unit:        Does this patient have any cognitive concerns?:     Activity level - Baseline/Home:  Independent  Activity Level - Current:   Independent    Patient's Preferred language: English   Needed?: No    Isolation: None and COVID r/o and special precautions  Infection: Not Applicable  Patient tested for COVID 19 prior to admission: YES  Bariatric?: No    Vital Signs:   Vitals:    04/02/22 1410 04/02/22 1415 04/02/22 1425 04/02/22 1445   BP:  114/68  110/71   Pulse: 81 79 76 77   Resp: 18 14 21 12   Temp:       TempSrc:       SpO2: 94% 94% 94%    Weight:       Height:           Cardiac Rhythm:Cardiac Rhythm: Normal sinus rhythm    Was the PSS-3  completed:   Yes  What interventions are required if any?               Family Comments:   Wife   OBS brochure/video discussed/provided to patient/family: No              Name of person given brochure if not patient:               Relationship to patient:       For the majority of the shift this patient's behavior was Green.   Behavioral interventions performed were none.    ED NURSE PHONE NUMBER:   [phone]

## 2022-04-02 NOTE — PLAN OF CARE
A&Ox 4. VSS on 4 L NC. Up SBA. Denies pain. Continent of bowel and bladder, using urinal at bedside. Tele NSR. PIV infusing Heparin. Regular diet, NPO at midnight. Pending discharge.

## 2022-04-02 NOTE — ED NOTES
Patient O2 is noted to be 80% on 2L O2 via NC. Upon entering room, patient reports that he had sat on edge of bed to use urinal. Discussed calling for assistance with exertion as oxygen demand increases. Patient understands and agreeable to POC. O2 is increased to 6L while patient recovers and is now left at 4L via NC and sats are 93%.

## 2022-04-02 NOTE — TELEPHONE ENCOUNTER
Patient calls with concerns regarding difficulty breathing. He was hospitalized 6 weeks ago with covid-19 and has been on oxygen at home. Patient was doing well, down to 1/2 L of oxygen but over the past few weeks has had more difficulty breathing and is currently on 2 L of oxygen. Patient reports that his O2 saturation at rest is 88%, will go into the 70's with exertion. Patient cannot go over 2 L of oxygen with his current home concentrator.     Go to ED now per protocol. Patient verbalizes understanding and will go to the ED. He denies further questions or concerns.    Elysia Bermudez RN  [facility]        Reason for Disposition    Oxygen level (e.g., pulse oximetry) 90 percent or lower    Additional Information    Negative: SEVERE difficulty breathing (e.g., struggling for each breath, speaks in single words)    Negative: [1] SEVERE weakness (e.g., can't stand or can barely walk) AND [2] new-onset or WORSE    Negative: Difficult to awaken or acting confused (e.g., disoriented, slurred speech)    Negative: Bluish (or gray) lips or face now    Negative: Sounds like a life-threatening emergency to the triager    Negative: [1] Typical COVID-19 symptoms AND [2] lasting less than 3 weeks    Negative: [1] Chest pain, pressure, or tightness AND [2] new-onset or worsening    Negative: [1] Fever AND [2] new-onset or worsening    Negative: [1] MODERATE difficulty breathing (e.g., speaks in phrases, SOB even at rest, pulse 100-120) AND [2] new-onset or WORSE    Negative: [1] MODERATE difficulty breathing AND [2] oxygen level (e.g., pulse oximetry) 91 to 94 percent    Protocols used: CORONAVIRUS (COVID-19) PERSISTING SYMPTOMS FOLLOW-UP CALL-A- 1.18.2022    COVID 19 Nurse Triage Plan/Patient Instructions    Please be aware that novel coronavirus (COVID-19) may be circulating in the community. If you develop symptoms such as fever, cough, or SOB or if you have concerns about the presence of another  infection including coronavirus (COVID-19), please contact your health care provider or visit [email or web address].     Disposition/Instructions    ED Visit recommended. Follow protocol based instructions.     Bring Your Own Device:  Please also bring your smart device(s) (smart phones, tablets, laptops) and their charging cables for your personal use and to communicate with your care team during your visit.    Thank you for taking steps to prevent the spread of this virus.  o Limit your contact with others.  o Wear a simple mask to cover your cough.  o Wash your hands well and often.    Resources    [facility]: About COVID-19: [email or web address]    CDC: What to Do If You're Sick: www.cdc.gov/coronavirus/2019-ncov/about/steps-when-sick.html    CDC: Ending Home Isolation: www.cdc.gov/coronavirus/2019-ncov/hcp/disposition-in-home-patients.html     CDC: Caring for Someone: www.cdc.gov/coronavirus/2019-ncov/if-you-are-sick/care-for-someone.html     [facility]: Interim Guidance for Hospital Discharge to Home: www.health.[facility].mn.us/diseases/coronavirus/hcp/hospdischarge.pdf    [facility] clinical trials (COVID-19 research studies): [email or web address]-clinical-trials     Below are the COVID-19 hotlines at the [place] Department of Health ([facility]). Interpreters are available.   o For health questions: Call 174-223-1671 or 1-455.629.7338 (7 a.m. to 7 p.m.)  o For questions about schools and childcare: Call 628-081-5787 or 1-438.804.7575 (7 a.m. to 7 p.m.)

## 2022-04-02 NOTE — PROGRESS NOTES
RECEIVING UNIT ED HANDOFF REVIEW    ED Nurse Handoff Report was reviewed by: Lillian Herman RN on April 2, 2022 at 4:51 PM

## 2022-04-02 NOTE — PHARMACY-ADMISSION MEDICATION HISTORY
Pharmacy Medication History  Admission medication history interview status for the 4/2/2022  admission is complete. See EPIC admission navigator for prior to admission medications     Location of Interview: Patient room  Medication history sources: Patient and Surescripts    Significant changes made to the medication list:  Removed prednisone    In the past week, patient estimated taking medication this percent of the time: greater than 90%    Additional medication history information:     Medication reconciliation completed by provider prior to medication history? No    Time spent in this activity: 15min    Prior to Admission medications    Medication Sig Last Dose Taking? Auth Provider   albuterol (PROAIR HFA/PROVENTIL HFA/VENTOLIN HFA) 108 (90 Base) MCG/ACT inhaler Inhale 2 puffs into the lungs every 6 hours prn at prn Yes Unknown, Entered By History   albuterol (PROVENTIL) (2.5 MG/3ML) 0.083% neb solution Take 2.5 mg by nebulization every 4 hours as needed for shortness of breath / dyspnea or wheezing prn at prn Yes Unknown, Entered By History   B Complex Vitamins (VITAMIN B-COMPLEX) TABS Take 1 tablet by mouth daily 4/2/2022 at Unknown time Yes Reported, Patient   Fe Fum-FA-B Hkv-W-Jo-Mg-Mn-Cu (HEMATINIC PLUS VIT/MINERALS) 106-1 MG TABS Take 1 tablet by mouth daily 4/2/2022 at Unknown time Yes Reported, Patient   lidocaine-prilocaine (EMLA) 2.5-2.5 % external cream Apply topically as needed for moderate pain prn at prn Yes Sandie Velarde PA-C   LORazepam (ATIVAN) 1 MG tablet Take 0.5-1 tablets (0.5-1 mg) by mouth every 8 hours as needed for anxiety prn at prn Yes Cathy Suazo MD   methylphenidate (METADATE ER) 20 MG CR tablet Take 20 mg by mouth 4 times daily  4/2/2022 at AM Yes Sandie Velarde PA-C   metroNIDAZOLE (METROGEL) 0.75 % external gel Apply 1 Application topically daily  prn at prn Yes Reported, Patient   UNABLE TO FIND Gummy form of Tumeric, Electrolyte (Magnesium, Calcium,Zinc).  Calm gummy at bedtime, Lung protocol from [employer]. 4/1/2022 at Unknown time Yes Unknown, Entered By History   Vitamin D, Cholecalciferol, 25 MCG (1000 UT) TABS Take 2,000 Units by mouth daily 4/2/2022 at Unknown time Yes Unknown, Entered By History   zinc sulfate (ZINCATE) 220 (50 Zn) MG capsule Take 220 mg by mouth daily 4/2/2022 at Unknown time Yes Unknown, Entered By History   predniSONE (DELTASONE) 20 MG tablet Take 3 tabs by mouth daily x 3 days, then 2 tabs daily x 3 days, then 1 tab daily x 3 days, then 1/2 tab daily x 3 days.  Patient not taking: Reported on 4/2/2022 Not Taking at Unknown time  Wei Hammonds MD       The information provided in this note is only as accurate as the sources available at the time of update(s)

## 2022-04-02 NOTE — ED TRIAGE NOTES
Been on home oxygen 2 L via NC, last 2 days been experiencing increasing shortness of breath with oxygen saturation drops down to 60-70% with activities, requiring more and more oxygen. Arrived in ED on 5 L on portable tank, feeling winded with activities.

## 2022-04-03 NOTE — PLAN OF CARE
Goal Outcome Evaluation:    VSS. Pt. Denies pain. Monitor remains Sinus rhythm. Heparin drip at 1350 unit(s)/hr. O2 at 4 L/NC with stable O2 sats. Continue to monitor.

## 2022-04-03 NOTE — PROGRESS NOTES
Pt here with acute on chronic hypoxic RF, NSTEMI. A&O x4.  VSS on 4L O2 via NC. Tele NSR.   Up with SBA. Denies pain.  Course LS in upper lobes, fine crackles noted at bases.    Heparin drip running at 1500 units/hr.    Pt scoring green on the Aggression Stop Light Tool.  Plan for possible angiogram tomorrow; will make NPO at midnight.

## 2022-04-03 NOTE — CONSULTS
Electrophysiology/ Cardiology- Consult Note         H&P and Plan:     Reason for consult: Non-STEMI.      History of present illness: 72-year old gentleman with a history of hypertension, hyperlipidemia, interstitial lung disease and recent Covid infection (requiring steroid therapy), who presents with worsening in shortness of breath and ruled in for non-STEMI.      Patient was recently been in the hospital with Covid pneumonia.  He was discharged on February 12th, and requiring O2 supplementation for 2 weeks after discharge.  At home, he was able to wean off the oxygen.  However, he started having more shortness of breath the last couple weeks.  He was evaluated by pulmonology and O2 supplementation was started.  He was also treated with steroids.  Due to persistent of symptoms, he came to the ED for evaluation.    In the ED, he was found to be hypoxic.  O2 supplementation was increased.  Troponin was elevated (around the thousand).  proBNP was also elevated (around 4000).  He was then started on heparin and Lasix.    At the moment, he is doing well.  He is saturating well on O2 supplementation.  He denies any other symptoms of chest pain, lightheadedness, near-syncope or syncope.     Baseline EKG showed sinus rhythm with signs of biatrial large and LVH.  No significant ischemic changes were seen.  Labs including BMP and CBC were within normal limits.     Previous studies:  -Cardiac MRI (4/2021): Normal V function.  Essentially normal heart.  -Chest CT (4/2/2022): No PE.  Marked increase in infiltrates bilaterally in the setting of pulmonary fibrosis.    Plan:  1.  Increase in shortness of breath/troponin and proBNP L elevation/non-STEMI.  Unclear if troponin elevation is related to demand ischemia actually underlying CAD.  I agree with current management.  I recommend the following:  -Continue heparin, Lasix and aspirin.  Goal 4 L negative in the next 24 hours.  -Echocardiogram.    We will consider coronary  "angiography after evaluation of echo and once respiratory symptoms improved.    Olvin Khanna MD    Clinically Significant Risk Factors Present on Admission            # Hypoalbuminemia: Albumin = 2.5 g/dL (Ref range: 3.4 - 5.0 g/dL) on admission, will monitor as appropriate        Fluid & Electrolyte Disorders: Fluid overload, unspecified    Hematology/Oncology: Thrombocytopenia Including Purpuric, HIT, & Other Platelet Defects: Coagulation defect, unspecified    Pulmonology: Other Pulmonary Conditions: Pulmonary fibrosis, unspecified    Systemic: Chronic Fatigue and Other Debilities: Limitation of activities due to disability       Physical Exam:  Vitals: /78   Pulse 81   Temp 97.4  F (36.3  C) (Oral)   Resp 18   Ht 1.727 m (5' 8\")   Wt 62.1 kg (136 lb 14.4 oz)   SpO2 97%   BMI 20.82 kg/m        Intake/Output Summary (Last 24 hours) at 4/3/2022 1330  Last data filed at 4/3/2022 1328  Gross per 24 hour   Intake 480 ml   Output 1750 ml   Net -1270 ml     Vitals:    04/02/22 1249 04/02/22 1737 04/03/22 0536   Weight: 61.2 kg (135 lb) 62.2 kg (137 lb 1.6 oz) 62.1 kg (136 lb 14.4 oz)       Constitutional:  AAO x3.  Pt is in NAD.  HEAD: Normocephalic.  SKIN: Skin normal color, texture and turgor with no lesions or eruptions.  Eyes: PERRL, EOMI.  ENT:  Supple, normal JVP. No lymphadenopathy or thyroid enlargement.  Chest:   Rales diffuse B/L.  Cardiac:  RRR, normal  S1 and S2.  No murmurs rubs or gallop.    Abdomen:  Normal BS.  Soft, non-tender and non-distended.  No rebound or guarding.    Extremities:  Pedious pulses palpable B/L.  No LE edema noticed.   Neurological: Strength and sensation grossly symmetric and intact throughout.         Review of Systems:  Complete review of system is otherwise negative with the exception of what was described above.     CURRENT MEDICATIONS:    furosemide  40 mg Intravenous BID     heparin ANTICOAGULANT Loading dose  30 Units/kg Intravenous Once     prenatal " multivitamin w/iron  1 tablet Oral Daily     sodium chloride (PF)  3 mL Intracatheter Q8H     vitamin B complex with vitamin C  1 tablet Oral Daily     vitamin D3  2,000 Units Oral Daily     zinc sulfate  220 mg Oral Daily     PRN Meds: acetaminophen **OR** acetaminophen, albuterol, lidocaine 4%, lidocaine (buffered or not buffered), melatonin, ondansetron **OR** ondansetron, - MEDICATION INSTRUCTIONS -, prochlorperazine **OR** prochlorperazine **OR** prochlorperazine, sodium chloride (PF)    ALLERGIES     Allergies   Allergen Reactions     Ether      Told as a child -      Grass Extracts [Gramineae Pollens] Itching       PAST MEDICAL HISTORY:  Past Medical History:   Diagnosis Date     ADD (attention deficit disorder)      ADD (attention deficit disorder)      CARDIOVASCULAR SCREENING; LDL GOAL LESS THAN 130 2/22/2013    Van [place]in Risk Score: 12% (13 Total Points)      Cataract     bilateral     Clavicle fracture      Foot fracture, right      Horseback riding age 27    bucked off horse, torn urethra, catheter for a time     Hyperlipidemia LDL goal <130 2/22/2013    Van Orin Risk Score: 12% (13 Total Points)      Hypertension goal BP (blood pressure) < 140/90 4/4/2013    Attempting to control with diet and exercise     Lung disease        PAST SURGICAL HISTORY:  Past Surgical History:   Procedure Laterality Date     CATARACT IOL, RT/LT      bilateral     COLONOSCOPY  4/25/2013     CV RIGHT HEART CATH MEASUREMENTS RECORDED N/A 4/21/2021    Procedure: CV RIGHT HEART CATH;  Surgeon: Amador English MD;  Location:  HEART CARDIAC CATH LAB     HERNIORRHAPHY INGUINAL Right 4/7/2016    Procedure: HERNIORRHAPHY INGUINAL;  Surgeon: Andriy Sheehan MD;  Location:  OR     LAPAROSCOPIC HERNIORRHAPHY INGUINAL  5/9/2013    Procedure: LAPAROSCOPIC HERNIORRHAPHY INGUINAL;  Laparoscopic inguinal hernia repair;  Surgeon: Andriy Sheehan MD;  Location:  OR     LASIK  2007     MOUTH SURGERY  In his 20's     Harold Teeth and tooth pulled     OPEN REDUCTION INTERNAL FIXATION CLAVICLE  2014    Procedure: OPEN REDUCTION INTERNAL FIXATION CLAVICLE;  Surgeon: Manjinder Landon MD;  Location: [facility] OR       FAMILY HISTORY:  Family History   Problem Relation Age of Onset     Cancer Mother         Brain   82 or 83     Diabetes Maternal Grandfather      C.A.D. Maternal Grandfather      Hypertension Maternal Grandfather      C.A.D. Brother 57        CABG, 3 vessel with valve replacement     Hypertension Brother      Allergies Brother         seasonal     Arthritis Sister      C.A.D. Father         cabg, stents.      Diabetes Other         Uncle - fathers side of family     Asthma No family hx of      Cerebrovascular Disease No family hx of      Breast Cancer No family hx of      Cancer - colorectal No family hx of      Prostate Cancer No family hx of      Alcohol/Drug No family hx of      Alzheimer Disease No family hx of      Anesthesia Reaction No family hx of      Blood Disease No family hx of        SOCIAL HISTORY:  Social History     Socioeconomic History     Marital status:      Spouse name: Kasey     Number of children: 0     Years of education: Not on file     Highest education level: Not on file   Occupational History     Occupation:      Employer: SELF     Comment: Floor Covering   Tobacco Use     Smoking status: Former Smoker     Packs/day: 1.50     Years: 20.00     Pack years: 30.00     Quit date: 1995     Years since quittin.2     Smokeless tobacco: Never Used   Substance and Sexual Activity     Alcohol use: Yes     Comment: couple times per week - 2-3     Drug use: No     Sexual activity: Yes     Partners: Female   Other Topics Concern     Parent/sibling w/ CABG, MI or angioplasty before 65F 55M? Not Asked      Service Not Asked     Blood Transfusions Not Asked     Caffeine Concern Not Asked     Comment: 1 cup of coffee in the am and tea     Occupational Exposure Not Asked      Hobby Hazards Not Asked     Sleep Concern Not Asked     Stress Concern Yes     Comment: job related -     Weight Concern Not Asked     Special Diet Not Asked     Back Care Not Asked     Exercise Not Asked     Comment: every morning. Yoga.     Bike Helmet Not Asked     Seat Belt Not Asked     Self-Exams Not Asked   Social History Narrative     Not on file     Social Determinants of Health     Financial Resource Strain: Not on file   Food Insecurity: Not on file   Transportation Needs: Not on file   Physical Activity: Not on file   Stress: Not on file   Social Connections: Not on file   Intimate Partner Violence: Not on file   Housing Stability: Not on file         Recent Lab Results:  Recent Labs   Lab 04/03/22  0540 04/02/22  2236 04/02/22  1849 04/02/22  1254   WBC 12.6*  --   --  13.8*   HGB 11.4*  --   --  12.1*   MCV 95  --   --  93     --   --  515*     --   --  138   POTASSIUM 3.7 3.7 3.6 3.3*   CHLORIDE 100  --   --  98   CO2 36*  --   --  32   BUN 13  --   --  14   CR 0.70  --   --  0.66   ANIONGAP 4  --   --  8   AMINATA 9.4  --   --  9.7   *  --   --  134*   ALBUMIN 2.5*  --   --   --    PROTTOTAL 6.8  --   --   --    BILITOTAL 0.2  --   --   --    ALKPHOS 59  --   --   --    ALT 24  --   --   --    AST 27  --   --   --

## 2022-04-03 NOTE — PROGRESS NOTES
Pt is A&O x 4, calm and pleasant. VSS on 4 LNC saturating mid 90s. Denied pain. On heperin drip at 1050 unit/hr. Tele NSR. NPO after midnight. Up SBA, using urinal at bedside, voiding edequate. Cardiologist consult pending. Continue to monitor.

## 2022-04-03 NOTE — PROGRESS NOTES
[facility]    Medicine Progress Note - Hospitalist Service    Date of Admission:  4/2/2022    Assessment & Plan          Josr Jamison is a 72 year old male admitted on 4/2/2022. He has a past medical history most remarkable for interstitial lung disease, recent coronavirus (treated with steroids), who presented with 6 weeks of worsening shortness of breath which got worse yesterday and he had elevated troponin and BNP and was started on heparin drip and was admitted    1) acute on chronic hypoxic respiratory failure  NSTEMI  Elevated BNP  -On admission patient presented with worsening shortness of breath.  Of note patient has been diagnosed with interstitial lung disease and was recovering from Covid about 6 weeks back.  He did mention that he was discharged with 2 L of oxygen and initially his oxygen needs were going down but for the past few days they have been going up and yesterday he was requiring 4 L in the ED  -Patient did had elevated troponin in the day peaked at 1228 and they have trended down  -His EKG did not show any ST elevation  -Patient has been started on IV Lasix and he is -1.1 L since admission and cardiology has been consulted and echo has been ordered  -On exam I did not appreciate significant crackles or any edema  -He was seen by cardiology team and appreciate input and echo is pending and continue with IV diuresis and patient does not show clinical improvement then we might have to consult pulmonary medicine  -We will continue to check his basic metabolic panel    2) interstitial lung disease  -We will see what the cardiology work-up comes back as and if it is unrevealing that he may need pulmonary consult    3) anxiety/attention deficit disorder  -We will continue with his home medications    4) mild leukocytosis  -His WBC count is trending down and CT scan of the chest on admission did not show any acute process and I will send for sputum sample and this can be  stress related but will continue to monitor and if he develops fever then we can do blood cultures         Diet: NPO for Medical/Clinical Reasons Except for: Meds, Ice Chips    DVT Prophylaxis: heparin drip  Sanchez Catheter: Not present  Central Lines: None  Cardiac Monitoring: ACTIVE order. Indication: Acute decompensated heart failure (48 hours)  Code Status: Full Code      Disposition Plan   Expected Discharge: 04/05/2022   Anticipated discharge location:  Awaiting care coordination huddle  Delays:           The patient's care was discussed with the Bedside Nurse, Patient and Patient's Family.  I did offer to call his wife but patient mentioned that he will be updating his wife himself    Michel Li MD  Hospitalist Service  [facility]  Securely message with the Vocera Web Console (learn more here)  Text page via [employer] Paging/Directory     I am off service from tomorrow morning and his care will be taken over by hospital medicine team    Clinically Significant Risk Factors Present on Admission             # Hypoalbuminemia: Albumin = 2.5 g/dL (Ref range: 3.4 - 5.0 g/dL) on admission, will monitor as appropriate          ______________________________________________________________________    Interval History     I saw the patient this morning and he mentioned that he is feeling better as he is getting more oxygen.  He denied any chest pain, nausea, vomiting, tingling or numbness anywhere else in the body.      He did mention that he has cough with sputum production and it was ordered that sputum sample be collected    Data reviewed today: I reviewed all medications, new labs and imaging results over the last 24 hours. I personally reviewed the cta chest image(s) showing no PE and ground glass changes .    Physical Exam   Vital Signs: Temp: 97.4  F (36.3  C) Temp src: Oral BP: 98/72 Pulse: 76   Resp: 18 SpO2: 95 % O2 Device: Nasal cannula Oxygen Delivery: 4 LPM  Weight: 136 lbs 14.4  oz        General: Patient appears comfortable and in no acute distress.  HEENT: Head is atraumatic, normocephalic.  Pupils are equal, round and reactive to light.  No scleral icterus. Oral mucosa is moist   Neck: Neck is supple and Lymphadenopathy   Respiratory: Lungs are clear to auscultation bilaterally with wheeze or  crackles with no  Cardiovascular: Regular rate , S1 and S2 normal with no murmer or rubs or gallops  Abdomen:   soft , non tender , non distended and bowel sound present   Skin: No skin rashes or lesions to inspection or palpation.  Neurologic: Higher functions are within normal limits. No obvious defects in speech, language and memory. No facial droop  Musculoskeletal: Normal Range of motion over upper and lower extremities bilaterally   Psychiatric: cooperative     Data   Recent Labs   Lab 04/03/22  0540 04/02/22  2236 04/02/22  1849 04/02/22  1254   WBC 12.6*  --   --  13.8*   HGB 11.4*  --   --  12.1*   MCV 95  --   --  93     --   --  515*     --   --  138   POTASSIUM 3.7 3.7 3.6 3.3*   CHLORIDE 100  --   --  98   CO2 36*  --   --  32   BUN 13  --   --  14   CR 0.70  --   --  0.66   ANIONGAP 4  --   --  8   AMINATA 9.4  --   --  9.7   *  --   --  134*   ALBUMIN 2.5*  --   --   --    PROTTOTAL 6.8  --   --   --    BILITOTAL 0.2  --   --   --    ALKPHOS 59  --   --   --    ALT 24  --   --   --    AST 27  --   --   --      Recent Results (from the past 24 hour(s))   CT Chest Pulmonary Embolism w Contrast    Narrative    EXAM: CT CHEST PULMONARY EMBOLISM W CONTRAST  LOCATION: [facility]  DATE/TIME: 4/2/2022 2:37 PM    INDICATION: Shortness of breath.  COMPARISON: 2/7/2022.  TECHNIQUE: CT chest pulmonary angiogram during arterial phase injection of IV contrast. Multiplanar reformats and MIP reconstructions were performed. Dose reduction techniques were used.   CONTRAST: 57mL Isovue 370        FINDINGS:  ANGIOGRAM CHEST: Pulmonary arteries are normal caliber  and negative for pulmonary emboli. Thoracic aorta is negative for dissection. No CT evidence of right heart strain.    LUNGS AND PLEURA: There is moderate to severe bilateral fibrosis. There is an increase in groundglass changes which may be an infectious or inflammatory infiltrate bilaterally. No effusions.    MEDIASTINUM/AXILLAE: Mildly enlarged lymph nodes which are nonspecific, similar to previous. No aneurysm.    CORONARY ARTERY CALCIFICATION: Severe.    UPPER ABDOMEN: No acute findings.    MUSCULOSKELETAL: No frankly destructive bony lesions.      Impression    IMPRESSION:  1.  No pulmonary embolism demonstrated.  2.  Marked interval increase in groundglass infiltrates bilaterally in the setting of pulmonary fibrosis.     Medications     heparin 1,200 Units/hr (04/03/22 0935)     - MEDICATION INSTRUCTIONS -         furosemide  40 mg Intravenous BID     Hematinic Plus Vit/Minerals  1 tablet Oral Daily     sodium chloride (PF)  3 mL Intracatheter Q8H     Vitamin B-Complex  1 tablet Oral Daily     Vitamin D (Cholecalciferol)  2,000 Units Oral Daily     zinc sulfate  220 mg Oral Daily

## 2022-04-04 NOTE — PLAN OF CARE
Care plan note:      Recent Vitals:  Temp: 98.2  F (36.8  C) Temp src: Oral BP: 102/72 Pulse: 67   Resp: 18 SpO2: 95 % O2 Device: Nasal cannula      Orientation/Neuro: Alert and Oriented x 4  Pain: The patient is not having any pain.   Tele: Sinus rhythm    IV medications: Heparin   Mobility: St. by assist   Skin: With in normal limits   GI: WDL and no complaints  : WDL     Diet: Tolerating diet:   Well  Orders Placed This Encounter      Low Saturated Fat Na <2400 mg      Safety/Concerns:  Fall Risk  Aggression Color: Green    Plan: Patient has been resting well tonight. VSS on 4L NC. Patient denies chest pain, but is dyspneic on exertion at times. Patient has had good output tonight. Heparin gtt running at 1500 unit(s)/hr. Patient was diaphoretic overnight but stated they have been the past few nights- VSS, no fever. Patient has been NPO since midnight for echo and angio planned for today.   Continue to monitor.      Natalya Lopez RN

## 2022-04-04 NOTE — PRE-PROCEDURE
GENERAL PRE-PROCEDURE:   Procedure:  Coronary angiogram, possible PCI, right heart catheterization  Date/Time:  4/4/2022 2:53 PM    Written consent obtained?: Yes    Risks and benefits: Risks, benefits and alternatives were discussed    Consent given by:  Patient  Patient states understanding of procedure being performed: Yes    Patient's understanding of procedure matches consent: Yes    Procedure consent matches procedure scheduled: Yes    Expected level of sedation:  Moderate  Appropriately NPO:  Yes  ASA Class:  4  Mallampati  :  Grade 2- soft palate, base of uvula, tonsillar pillars, and portion of posterior pharyngeal wall visible  Heart:  RRR  History & Physical reviewed:  History and physical reviewed and no updates needed  Statement of review:  I have reviewed the lab findings, diagnostic data, medications, and the plan for sedation

## 2022-04-04 NOTE — CONSULTS
CLINICAL NUTRITION SERVICES  -  ASSESSMENT NOTE      Future/Additional Recommendations:   Will be available for oral nutritional supplements in future for weight gain, prn, if patient receptive.   Malnutrition:   % Weight Loss:  Weight loss does not meet criteria for malnutrition - No recent significant weight loss  % Intake:  No decreased intake noted  Subcutaneous Fat Loss:  Orbital region mild depletion and Upper arm region moderate depletion  Muscle Loss:  Temporal region mild depletion, Clavicle bone region mild depletion, Acromion bone region mild depletion and Dorsal hand region mild depletion  Fluid Retention:  None noted    Malnutrition Diagnosis: Moderate malnutrition  In Context of:  Chronic illness or disease        REASON FOR ASSESSMENT  Josr Jamison is a 72 year old male seen by Registered Dietitian for Admission Nutrition Risk Screen for weight loss (14-23 lbs); pt denied recent decreased appetite and poor oral intake.    DX:   Acute on chronic hypoxic respiratory failure   NSTEMI     PMH:   Attention deficit disorder   Anxiety   Hyperlipidemia   Hypertension   Recent COVID-19 in Feb 2022  Interstitial lung disease   Hepatitis     NUTRITION HISTORY  - Information obtained from patient.  - Patient is on a lacto-ovo vegetarian diet at home.  - Typical food/fluid intake is good.  - Diet history: Pt has avoided meat, fish, and poultry since he was 24 years old. He states his diet is extremely healthy at home. He does add salt to foods and consume some higher salt containing foods, however.   - Supplements:  None.   - No food allergies.  He takes multiple vitamins at home.    CURRENT NUTRITION ORDERS  Diet Order:     Low Saturated Fat//2400 mg Sodium     Current Intake/Tolerance:  Pt consumed 100% of both meals yesterday.  He can't understand why there is a salt restriction, which denies him many food items.  When he was here with COVID in February, they let him have whatever he wanted (no diet  "restrictions). He wishes to address this with the provider.  He is not interested any oral liquid nutritional supplements for weight gain at this time.     Plan coronary angiogram today.    NUTRITION FOCUSED PHYSICAL ASSESSMENT FOR DIAGNOSING MALNUTRITION)  Yes                Observed:    Muscle wasting (refer to documentation in Malnutrition section)   Subcutaneous fat loss (refer to documentation in Malnutrition section)    Obtained from Chart/Interdisciplinary Team:  Thin appearing    ANTHROPOMETRICS  Height: 5' 8\"  Weight:  61.5 kg (135 lbs 8 oz)  Body mass index is 20.6 kg/m .  Weight Status:  Normal BMI  IBW: 70 kg  % IBW: 88%  Weight History:   - He reports he lost the weight last year when he became sick with lung disease and hasn't been able to regain.  - Weight loss 12 kg (16%) over past 3 years  - Weight loss 2.5 kg (4%) over past 1 year    Wt Readings from Last 20 Encounters:   04/04/22 61.5 kg (135 lb 8 oz)   02/08/22 63 kg (138 lb 14.2 oz)   01/24/22 63 kg (139 lb)   01/18/22 63.3 kg (139 lb 8 oz)   03/10/21 64 kg (141 lb)   06/20/19 73.5 kg (162 lb)   05/23/19 73.5 kg (162 lb)   02/28/18 75.8 kg (167 lb)   02/09/18 74.4 kg (164 lb)   04/06/16 80 kg (176 lb 4.8 oz)   03/24/16 80 kg (176 lb 4.8 oz)   06/20/14 74.9 kg (165 lb 1.6 oz)   06/18/14 77.4 kg (170 lb 11.2 oz)   06/16/14 78.5 kg (173 lb)   06/10/14 78.9 kg (174 lb)   06/10/14 79.2 kg (174 lb 8 oz)   05/02/14 78.6 kg (173 lb 4.8 oz)   05/02/13 79.9 kg (176 lb 3.2 oz)   04/25/13 77.1 kg (170 lb)   04/04/13 79.2 kg (174 lb 9.6 oz)     LABS  Labs reviewed    MEDICATIONS  Medications reviewed  Lasix  Prenatal Vit with Fe, Vit B & C, Vit D3, Zinc SO4 - for supplementation    ASSESSED NUTRITION NEEDS PER APPROVED PRACTICE GUIDELINES:    Dosing Weight: 61.5 kg (4/4)   Estimated Energy Needs: 5655-9247 kcals (30-35 Kcal/Kg)  Justification: repletion and underweight  Estimated Protein Needs: 74-92 grams protein (1.2-1.5 g pro/Kg)  Justification: Repletion " and hypercatabolism with acute illness  Estimated Fluid Needs: 3743-9898 mL (1 mL/Kcal)  Justification: maintenance    MALNUTRITION:  % Weight Loss:  Weight loss does not meet criteria for malnutrition - No recent significant weight loss  % Intake:  No decreased intake noted  Subcutaneous Fat Loss:  Orbital region mild depletion and Upper arm region moderate depletion  Muscle Loss:  Temporal region mild depletion, Clavicle bone region mild depletion, Acromion bone region mild depletion and Dorsal hand region mild depletion  Fluid Retention:  None noted    Malnutrition Diagnosis: Moderate malnutrition  In Context of:  Chronic illness or disease    NUTRITION DIAGNOSIS:  Increased nutrient needs (energy and protein) related to interstitial lung disease as evidenced by 4% weight loss over the past year with inability to regain and loss of subcutaneous fat and muscle mass.      NUTRITION INTERVENTIONS  Recommendations / Nutrition Prescription  Will be available for oral nutritional supplements in future for weight gain, prn, if patient receptive.    Implementation  Nutrition education: Per Provider order if indicated     Nutrition Goals  Patient will continue to consume % meals TID.  No further weight loss < 61.5 kg.    MONITORING AND EVALUATION:  Progress towards goals will be monitored and evaluated per protocol and Practice Guidelines    Dora Andre, RD, LD, CNSC

## 2022-04-04 NOTE — PROGRESS NOTES
[facility]    Hospitalist Progress Note    Interval History   - Stable on 4L today  - Angiogram per Cardiology  - Discussed Code status, he understands and wants to be full code, just does not want prolonged intubation or to be kept alive if braindead    Assessment & Plan   Summary: Josr Jamison is a 72 year old male with PMH interstitial lung disease, recent COVID-19 in Feb 2022, who was admitted on 4/2/2022 with progressively worsening YEBOAH.    Acute on chronic hypoxic respiratory failure  NSTEMI  Suspected new acute diastolic heart failure exacerbation  On admission patient presented with worsening shortness of breath.  Of note patient has been diagnosed with interstitial lung disease and was recovering from Covid about 6 weeks back.  Has had gradually increasing dyspnea and oxygen needs at home in the preceding days to weeks.   On admission elevated troponin 1400-->-->1048. His EKG was nonischemic. Echocardiogram on 4/3 shows normal EF but increased RV pressures and pulmonary hypertension. Patient has been started on IV Lasix and is diuresing.   No evidence of infection this admission, CT negative for PE.  - Appreciate Cardiology consultation   - Angiogram today   - Continue IV diuresis   - Continue heparin    Interstitial lung disease  - Consider Pulm consult pending angiogram results    Anxiety/attention deficit disorder  -We will continue with his home medications    DVT Prophylaxis: Heparin drip  Code Status: Full Code  PT/OT: not needed  Diet: Low Saturated Fat Na <2400 mg  NPO for Medical/Clinical Reasons Except for: Meds      Disposition: Expected discharge 2-3 days    Ang Hurst MD  Text Page  (7am to 6pm)  -Data reviewed today: I reviewed all new labs and imaging results over the last 24 hours.    Physical Exam   Temp: 98.2  F (36.8  C) Temp src: Oral BP: 115/77 Pulse: 73   Resp: 16 SpO2: 96 % O2 Device: Nasal cannula Oxygen Delivery: 4 LPM  Vitals:    04/02/22 1737  04/03/22 0536 04/04/22 0542   Weight: 62.2 kg (137 lb 1.6 oz) 62.1 kg (136 lb 14.4 oz) 61.5 kg (135 lb 8 oz)     Vital Signs with Ranges  Temp:  [98.2  F (36.8  C)-98.6  F (37  C)] 98.2  F (36.8  C)  Pulse:  [60-88] 73  Resp:  [16-24] 16  BP: ()/(63-77) 115/77  SpO2:  [94 %-96 %] 96 %  I/O last 3 completed shifts:  In: 720 [P.O.:720]  Out: 3425 [Urine:3425]  O2 requirements: 4L    Constitutional: Thin male in NAD  HEENT: Eyes nonicteric, oral mucosa moist  Cardiovascular: RRR, normal S1/2, no m/r/g  Respiratory: Decreased air flow throughout, clear other than fine crackles basilar bilaterally  Vascular: No LE pitting edema  GI: Normoactive bowel sounds, nontender  Skin/Integumen: No rashes  Neuro/Psych: Appropriate affect and mood. A&Ox3, moves all extremities    Medications     heparin 1,650 Units/hr (04/04/22 1139)     - MEDICATION INSTRUCTIONS -       - MEDICATION INSTRUCTIONS -       sodium chloride 10 mL/hr (04/04/22 1014)       furosemide  40 mg Intravenous BID     prenatal multivitamin w/iron  1 tablet Oral Daily     sodium chloride (PF)  3 mL Intracatheter Q8H     sodium chloride (PF)  3 mL Intracatheter Q8H     vitamin B complex with vitamin C  1 tablet Oral Daily     vitamin D3  2,000 Units Oral Daily     zinc sulfate  220 mg Oral Daily       Data   Recent Labs   Lab 04/04/22  1045 04/04/22  0335 04/03/22  0540 04/02/22  1849 04/02/22  1254   WBC  --  10.9 12.6*  --  13.8*   HGB  --  11.4* 11.4*  --  12.1*   MCV  --  95 95  --  93   PLT  --  438 414  --  515*   NA  --  136 140  --  138   POTASSIUM 3.6 3.4 3.7   < > 3.3*   CHLORIDE  --  96 100  --  98   CO2  --  36* 36*  --  32   BUN  --  16 13  --  14   CR  --  0.64* 0.70  --  0.66   ANIONGAP  --  4 4  --  8   AMINATA  --  9.2 9.4  --  9.7   GLC  --  115* 103*  --  134*   ALBUMIN  --  2.6* 2.5*  --   --    PROTTOTAL  --  6.9 6.8  --   --    BILITOTAL  --  0.2 0.2  --   --    ALKPHOS  --  54 59  --   --    ALT  --  23 24  --   --    AST  --  20 27  --    --     < > = values in this interval not displayed.       Imaging:   No results found for this or any previous visit (from the past 24 hour(s)).

## 2022-04-04 NOTE — PROGRESS NOTES
"       Assessment and Plan:     1. Acute respiratory failure with hypoxia, probably multifactorial due to interstitial lung disease, recent Covid pneumonia, and possible acute heart failure with preserved ejection fraction.  NT proBNP level is mildly elevated at about 4000.    2.  Elevated troponin level, possible nontransmural infarction.  Peak troponin level 1200.  The patient has \"severe\" coronary calcifications reported by radiology on CT chest, but had no chest pain and no clear ischemic ECG changes.  Echo shows normal wall motion, with ejection fraction of 60 to 65%.  This may be a small demand infarction caused by hypoxia, or due to right heart strain.    3.  Interstitial lung disease, chronic, with acute worsening requiring more frequent oxygen use after Covid pneumonia in February.  He was treated with steroids and antiviral therapy during her brief hospitalization, but not intubated.    4.  Hypoalbuminemia, 2.6    5.  Mild anemia, hemoglobin 11.4    Recommendations:    I recommended coronary angiography and right heart catheterization today, with possible percutaneous coronary intervention.Risks and benefits of left heart catheterization and coronary angiogram were discussed with the patient in detail. Risk estimated at 0.1-0.3% for diagnostic angio and 1-2% for PCI, including risk of stroke, MI, death, emergent bypass, contrast induced allergic reaction, renal dysfunction, and vascular complications (including bleeding and transfusion) were discussed. Patient understands and wishes to proceed.    We will hold further diuresis for now until we have completed the cardiac catheterization.    BERKLEY BAUTISTA MD        Interval History:   Remains on oxygen but no dyspnea at rest. Denies a history of chest pain.           Medications:       aspirin  325 mg Oral Once    Or     aspirin  243 mg Oral Once     furosemide  40 mg Intravenous BID     prenatal multivitamin w/iron  1 tablet Oral Daily     sodium " "chloride (PF)  3 mL Intracatheter Q8H     sodium chloride (PF)  3 mL Intracatheter Q8H     vitamin B complex with vitamin C  1 tablet Oral Daily     vitamin D3  2,000 Units Oral Daily     zinc sulfate  220 mg Oral Daily            Physical Exam:   Blood pressure 103/65, pulse 69, temperature 98.6  F (37  C), temperature source Oral, resp. rate 16, height 1.727 m (5' 8\"), weight 61.5 kg (135 lb 8 oz), SpO2 96 %.    Vitals:    04/02/22 1249 04/02/22 1737 04/03/22 0536 04/04/22 0542   Weight: 61.2 kg (135 lb) 62.2 kg (137 lb 1.6 oz) 62.1 kg (136 lb 14.4 oz) 61.5 kg (135 lb 8 oz)         Intake/Output Summary (Last 24 hours) at 4/4/2022 0934  Last data filed at 4/4/2022 0126  Gross per 24 hour   Intake 720 ml   Output 3425 ml   Net -2705 ml       03/30 0700 - 04/04 0659  In: 720 [P.O.:720]  Out: 4525 [Urine:4525]  Net: -3805    Exam:  GENERAL APPEARANCE ADULT: Alert, in no acute distress  RESP: crackles diffusely, fine rales  CV: regular rate and rhythm, no murmur, rub, or gallop  ABDOMEN: normal bowel sounds, soft, nontender, no hepatosplenomegaly or other masses  EXTREMITIES: No edema         Data:   LABS (Last four results)  CMP  Recent Labs   Lab 04/04/22  0621 04/04/22  0335 04/03/22  0540 04/02/22  2236 04/02/22  1849 04/02/22  1254   NA  --  136 140  --   --  138   POTASSIUM  --  3.4 3.7 3.7 3.6 3.3*   CHLORIDE  --  96 100  --   --  98   CO2  --  36* 36*  --   --  32   ANIONGAP  --  4 4  --   --  8   GLC  --  115* 103*  --   --  134*   BUN  --  16 13  --   --  14   CR  --  0.64* 0.70  --   --  0.66   GFRESTIMATED  --  >90 >90  --   --  >90   AMINATA  --  9.2 9.4  --   --  9.7   MAG 2.1  --   --  2.1  --  1.9   PROTTOTAL  --  6.9 6.8  --   --   --    ALBUMIN  --  2.6* 2.5*  --   --   --    BILITOTAL  --  0.2 0.2  --   --   --    ALKPHOS  --  54 59  --   --   --    AST  --  20 27  --   --   --    ALT  --  23 24  --   --   --      CBC  Recent Labs   Lab 04/04/22  0335 04/03/22  0540 04/02/22  1254   WBC 10.9 12.6* 13.8* "   RBC 3.80* 3.81* 4.07*   HGB 11.4* 11.4* 12.1*   HCT 36.2* 36.2* 37.9*   MCV 95 95 93   MCH 30.0 29.9 29.7   MCHC 31.5 31.5 31.9   RDW 13.2 13.6 13.4    414 515*     INRNo lab results found in last 7 days.  TROPONINS No results found for: TROPI, TROPONIN, TROPR, TROPN                                                                                                            LILY, BERKLEY BAUER MD

## 2022-04-04 NOTE — PLAN OF CARE
Goal Outcome Evaluation:    VSS. Monitor remains Sinus rhythm. Pt. Denies pain. Heparin drip at 1650 unit(s)/hr. Pt. Sent to cath lab for angiogram.

## 2022-04-05 NOTE — PROGRESS NOTES
Assessment and Plan:     1. Acute respiratory failure with hypoxia, probably multifactorial due to interstitial lung disease and recent Covid pneumonia. Intracardiac pressures, including PCWP and PA pressures, were low on the right heart catheterization yesterday, indicating that this was not likely acute heart failure and the patient is hypovolemic (overdiuresed).    2.  Elevated troponin level, due to demand ischemia.  Peak troponin level 1200.  The patient has  had no chest pain and no clear ischemic ECG changes.  Echo shows normal wall motion, with ejection fraction of 60 to 65%.  Coronary angiogram showed no significant large vessel CAD, probably diffuse, small vessel disease. This was a small demand ischemic event caused by hypoxia combined with small vessel coronary disease.     3.  Interstitial lung disease, chronic, with acute worsening requiring more frequent oxygen use after Covid pneumonia in February.  He was treated with steroids and antiviral therapy during her brief hospitalization, but not intubated.    4.  Hypoalbuminemia, 2.6    5.  Mild anemia, hemoglobin 11.4    Recommendations:    The worsening respiratory issues are likely attributable to pulmonary sources, not cardiac issues. These sources are likely ILD with recent covid pneumonia and prolonged recovery. I would suggest pulmonology evaluation.     Stop diuresis. I will give some fluid back.     Cardiology will sign off.     BERKLEY BAUTISTA MD        Interval History:   Remains on oxygen but no dyspnea at rest. Denies a history of chest pain.           Medications:       prenatal multivitamin w/iron  1 tablet Oral Daily     sodium chloride (PF)  3 mL Intracatheter Q8H     vitamin B complex with vitamin C  1 tablet Oral Daily     vitamin D3  2,000 Units Oral Daily     zinc sulfate  220 mg Oral Daily            Physical Exam:   Blood pressure 101/68, pulse 80, temperature 98.8  F (37.1  C), temperature source Oral, resp. rate 18,  "height 1.727 m (5' 8\"), weight 60.6 kg (133 lb 11.2 oz), SpO2 95 %.    Vitals:    04/02/22 1249 04/02/22 1737 04/03/22 0536 04/04/22 0542   Weight: 61.2 kg (135 lb) 62.2 kg (137 lb 1.6 oz) 62.1 kg (136 lb 14.4 oz) 61.5 kg (135 lb 8 oz)    04/05/22 0500   Weight: 60.6 kg (133 lb 11.2 oz)         Intake/Output Summary (Last 24 hours) at 4/4/2022 0934  Last data filed at 4/4/2022 0126  Gross per 24 hour   Intake 720 ml   Output 3425 ml   Net -2705 ml       03/31 0700 - 04/05 0659  In: 1840 [P.O.:1840]  Out: 7250 [Urine:7250]  Net: -5410    Exam:  GENERAL APPEARANCE ADULT: Alert, in no acute distress  RESP: crackles diffusely, fine rales  CV: regular rate and rhythm, no murmur, rub, or gallop  ABDOMEN: normal bowel sounds, soft, nontender, no hepatosplenomegaly or other masses  EXTREMITIES: No edema         Data:   LABS (Last four results)  CMP  Recent Labs   Lab 04/05/22  0548 04/04/22  1045 04/04/22  0621 04/04/22  0335 04/03/22  0540 04/02/22  2236 04/02/22  1849 04/02/22  1254     --   --  136 140  --   --  138   POTASSIUM 4.1 3.6  --  3.4 3.7 3.7   < > 3.3*   CHLORIDE 91*  --   --  96 100  --   --  98   CO2 41*  --   --  36* 36*  --   --  32   ANIONGAP 2*  --   --  4 4  --   --  8   *  --   --  115* 103*  --   --  134*   BUN 11  --   --  16 13  --   --  14   CR 0.80  --   --  0.64* 0.70  --   --  0.66   GFRESTIMATED >90  --   --  >90 >90  --   --  >90   AMINATA 9.9  --   --  9.2 9.4  --   --  9.7   MAG 2.2  --  2.1  --   --  2.1  --  1.9   PROTTOTAL 7.3  --   --  6.9 6.8  --   --   --    ALBUMIN 2.8*  --   --  2.6* 2.5*  --   --   --    BILITOTAL 0.4  --   --  0.2 0.2  --   --   --    ALKPHOS 56  --   --  54 59  --   --   --    AST 22  --   --  20 27  --   --   --    ALT 22  --   --  23 24  --   --   --     < > = values in this interval not displayed.     CBC  Recent Labs   Lab 04/05/22  0548 04/04/22  0335 04/03/22  0540 04/02/22  1254   WBC 9.0 10.9 12.6* 13.8*   RBC 4.07* 3.80* 3.81* 4.07*   HGB 12.0* " 11.4* 11.4* 12.1*   HCT 39.1* 36.2* 36.2* 37.9*   MCV 96 95 95 93   MCH 29.5 30.0 29.9 29.7   MCHC 30.7* 31.5 31.5 31.9   RDW 13.1 13.2 13.6 13.4   * 438 414 515*     INRNo lab results found in last 7 days.  TROPONINS No results found for: TROPI, TROPONIN, TROPR, TROPN                                                                                                            LILY, BERKLEY BAUER MD

## 2022-04-05 NOTE — TELEPHONE ENCOUNTER
[facility] Call Center    Phone Message    May a detailed message be left on voicemail: yes     Reason for Call: Other: Trey shaffer at [facility] pt will discharge today 4/5/22 pt will need hospital f/u with Amy[facility] in [facility] for 2-3 weeks. No avail for [facility] populating within the time frame asked msg sent to clinic to assist requesting clinic call pt back to schedule.    Action Taken: Message routed to:  Other: Cardiology    Travel Screening: Not Applicable

## 2022-04-05 NOTE — PLAN OF CARE
Goal Outcome Evaluation:    VSS. Monitor remains Sinus rhythm. O2 at 3 L/NC with stable O2 sats. Pt. Denies pain. Continue to monitor. Plan for possible discharge to home today.

## 2022-04-05 NOTE — CONSULTS
"PULMONOLOGY CONSULTATION  Date of service: 4/5/2022    [facility]    _____________________________________________________    Josr Jamison  72 year old male  [id number]  [address]  [place] [address]    Primary Care Provider:  Elizabeth Kebede  Admission Date: 4/2/2022  Hospital Attending Physician:  Ang Hurst MD  ________________________________________    CHIEF COMPLAINT : I was asked to see this patient by Dr. Hurst for evaluation of \"acute hypoxic respiratory failure, pulmonary fibrosis, question CHF\"  Informant: EHR and patient    HISTORY OF PRESENT ILLNESS     Josr Jamison is a 72 year old male with past medical history significant for interstitial lung disease    Mr. Jamison established care with [place] [facility] January 24, 2022, when he was seen for consultation by Dr. Wei Hammonds. His detailed note reviewed:    \"I had the pleasure of meeting Elton Jamison, who is a 71-year-old male (former smoker, 38-pack-years) who presents to clinic for an evaluation of pulmonary fibrosis.  He was previously seen at the ILD clinic over at [place].  To briefly review, Sean was admitted at [facility] on 12/22/2020 for acute hypoxic respiratory failure.  He had presented with rapidly progressive shortness of breath over 6 day period in late December.  CT imaging at that time revealed bilateral interstitial pulmonary infiltrates as well as groundglass opacities.  Infectious work-up was negative including Covid influenza and RSV.  An extensive autoimmune/serologic work-up was obtained which returned negative.  He was treated on the medical floor and required as high as 4 to 5 L, he was discharged on 3 L of supplemental oxygen continuously.  An echo done at that time was suggestive of pulmonary hypertension.  Following discharge, Sean continued to have persistent symptoms of cough, shortness of breath and subjective fevers albeit being improved.  He was then seen " at the [facility] in March 2021 for interstitial lung disease. 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  A remote review of CT cervical spine from 2014 showed evidence of ILD changes in the upper lung fields indicative of a long standing lung disease process. He was initiated on a course of prednisone and trimethoprim-sulfamethoxazole, the latter for pneumocystis prophylaxis. A repeat serologic, autoimmune and vasculitic work-up returned negative.  He was then referred to thoracic surgery and he underwent surgical lung biopsy which revealed a UIP-like pattern with abundant inflammatory cell infiltrates, organizing pneumonia, and focal diffuse alveolar damage. Note, the biopsy slides were reviewed at [facility]. He was then continued on a prolonged steroid taper which was bridged with Cellcept.  Unfortunately, he was unable to tolerate mycophenolate and he weaned himself off of prednisone.   He did have a telehealth visit with Dr Hasmukh Reyes in Cardiology in the [facility] system, and he has an MRI heart and right cath done which ruled out right heart disease and pulmonary hypertension.  Review of imaging from February and March 2021 shows groundglass opacities and fibrotic changes bilaterally. Review of his CT cervical spine in 2014 shows parenchymal changes in the partially visualized upper lobes consistent with interstitial lung disease, though findings are not as prominent as now.  Today, Sean continues to have some dyspnea with exertion much improved from March 2020.  He believes that he is close to 60% of his baseline.  He still able to perform calisthenics which is the kind of exercise he is used to doing.  He recently returned from [place] and noted that he was unable to explore several areas like he used to in the past due to exercise intolerance.  He has minimal cough no wheezing and  "no chest tightness.  He denies any cyanosis, no chest pain, no dysphagia, no dysphonia, no pedal swellings.  He denies any GERD-like symptoms.  He has been able to wean his oxygen off and currently requires no oxygen at rest.  He occasionally requires oxygen up to 1 to 2 L during periods of prolonged exertion.  He is completely off steroids.    On further questioning, historically, he had noted that he has periods of increased shortness of breath and coughing with subjective fevers going back to about 8 to 10 years ago.  During this periods of \"pneumonia flares\" as this is what he referred to them as, he never sought any medical care and he will usually spontaneously improve in the next few days.  Remote history of smoking, 38 pack years of smoking, quit in 1996.  Longtime history of working with carpets.  Sean has been exposed to a lot of carpet dust and he never wore any protective gears or respiratory filters.  No family history of lung cancer.\"    Due to his past inability to tolerate mycophenylate and his CT at the time showing more fibrosis than ground-glass, recommendation was to start OFEV. He was counseled on antigen avoidance as it related to his current occcupation with carpets, but he planned to continue working a few more years before jail \"just wearing a respirator on the job.\" Patient declined starting OFEV.     In February he was hospitalized with COVID-19 pneumonia. He underwent an extended course of steroids, and initially improved weaning off significant amount of oxygen in the days after he was discharged from the hospital.  Unfortunately he started feeling worse and his outpatient pulmonologist prescribed a course of steroids.  Over the past several weeks he had worsening dyspnea on exertion requiring more supplemental oxygen, orthopnea, and paroxysmal nocturnal dyspnea.  He found that he was needing to increase his oxygen to greater than 3 L/min.  He was hoping that he could get his " outpatient pulmonologist to give more steroids and increase his oxygen supplementation, instead it was advised that he be evaluated in the emergency department.  He presented emergency department 4/2/22, he was found to be hypoxic requiring 3 L/min of oxygen at rest.  His BNP was significantly elevated as well his troponin.  EKG was not consistent with acute coronary syndrome.  He had a CT scan that demonstrated lung changes consistent with interstitial lung disease although unclear on the acuity of with stress changes happened.  He was started on intravenous heparin, and admitted to the hospital for further evaluation.      On 4/4/22 underwent coronary angiogram and RHC. Intracardiac pressures, including PCWP and PA pressures, were low on the right heart catheterization yesterday, indicating that this was not likely acute heart failure and the patient is hypovolemic (overdiuresed).  Echo shows normal wall motion, with ejection fraction of 60 to 65%.  Coronary angiogram showed no significant large vessel CAD, probably diffuse, small vessel disease. This was a small demand ischemic event caused by hypoxia combined with small vessel coronary disease. Diuresis has been stopped, and today cardiology plans to give fluid back.    HOME MEDICATIONS   Pulmonary meds: Albuterol PRN  Home Oxygen: yes, chronic  Medications Prior to Admission   Medication Sig Dispense Refill Last Dose     albuterol (PROAIR HFA/PROVENTIL HFA/VENTOLIN HFA) 108 (90 Base) MCG/ACT inhaler Inhale 2 puffs into the lungs every 6 hours   prn at prn     albuterol (PROVENTIL) (2.5 MG/3ML) 0.083% neb solution Take 2.5 mg by nebulization every 4 hours as needed for shortness of breath / dyspnea or wheezing   prn at prn     B Complex Vitamins (VITAMIN B-COMPLEX) TABS Take 1 tablet by mouth daily   4/2/2022 at Unknown time     Fe Fum-FA-B Hew-C-Fi-Mg-Mn-Cu (HEMATINIC PLUS VIT/MINERALS) 106-1 MG TABS Take 1 tablet by mouth daily   4/2/2022 at Unknown time      lidocaine-prilocaine (EMLA) 2.5-2.5 % external cream Apply topically as needed for moderate pain 5 g 3 prn at prn     LORazepam (ATIVAN) 1 MG tablet Take 0.5-1 tablets (0.5-1 mg) by mouth every 8 hours as needed for anxiety 3 tablet 0 prn at prn     methylphenidate (METADATE ER) 20 MG CR tablet Take 20 mg by mouth 4 times daily    4/2/2022 at AM     metroNIDAZOLE (METROGEL) 0.75 % external gel Apply 1 Application topically daily    prn at prn     UNABLE TO FIND Gummy form of Tumeric, Electrolyte (Magnesium, Calcium,Zinc). Calm gummy at bedtime, Lung protocol from [employer].   4/1/2022 at Unknown time     Vitamin D, Cholecalciferol, 25 MCG (1000 UT) TABS Take 2,000 Units by mouth daily   4/2/2022 at Unknown time     zinc sulfate (ZINCATE) 220 (50 Zn) MG capsule Take 220 mg by mouth daily   4/2/2022 at Unknown time     predniSONE (DELTASONE) 20 MG tablet Take 3 tabs by mouth daily x 3 days, then 2 tabs daily x 3 days, then 1 tab daily x 3 days, then 1/2 tab daily x 3 days. (Patient not taking: Reported on 4/2/2022) 20 tablet 0 Not Taking at Unknown time       PAST MEDICAL HISTORY      Patient Active Problem List   Diagnosis     Advanced directives, counseling/discussion     ADD (attention deficit disorder)     Right inguinal hernia     CARDIOVASCULAR SCREENING; LDL GOAL LESS THAN 130     Hypertension goal BP (blood pressure) < 140/90     History of hepatitis     Glaucoma suspect     Cataract     Abnormal findings diagnostic imaging of heart and coronary circulation     Hypoxia     Infection due to 2019 novel coronavirus     Shortness of breath     ILD (interstitial lung disease) (H)     NSTEMI (non-ST elevated myocardial infarction) (H)     Acute respiratory failure with hypoxia (H)     Acute congestive heart failure, unspecified heart failure type (H)       Past Medical History:   Diagnosis Date     ADD (attention deficit disorder)      ADD (attention deficit disorder)      CARDIOVASCULAR SCREENING; LDL GOAL  LESS THAN 130 2/22/2013    Donner Risk Score: 12% (13 Total Points)      Cataract     bilateral     Clavicle fracture      Foot fracture, right      Horseback riding age 27    bucked off horse, torn urethra, catheter for a time     Hyperlipidemia LDL goal <130 2/22/2013    Donner Risk Score: 12% (13 Total Points)      Hypertension goal BP (blood pressure) < 140/90 4/4/2013    Attempting to control with diet and exercise     Lung disease      Past Surgical History:   Procedure Laterality Date     CATARACT IOL, RT/LT      bilateral     COLONOSCOPY  4/25/2013     CV CORONARY ANGIOGRAM N/A 4/4/2022    Procedure: Coronary Angiogram;  Surgeon: Iveth Coleman MD;  Location:  HEART CARDIAC CATH LAB     CV PCI N/A 4/4/2022    Procedure: Percutaneous Coronary Intervention;  Surgeon: Iveth Coleman MD;  Location: [facility] CARDIAC CATH LAB     CV RIGHT HEART CATH MEASUREMENTS RECORDED N/A 4/21/2021    Procedure: CV RIGHT HEART CATH;  Surgeon: Amador English MD;  Location:  HEART CARDIAC CATH LAB     CV SWAN DANIAL PROCEDURE N/A 4/4/2022    Procedure: [place] Danial Procedure;  Surgeon: Iveth Coleman MD;  Location: [facility] CARDIAC CATH LAB     HERNIORRHAPHY INGUINAL Right 4/7/2016    Procedure: HERNIORRHAPHY INGUINAL;  Surgeon: Andriy Sheehan MD;  Location: [facility] OR     LAPAROSCOPIC HERNIORRHAPHY INGUINAL  5/9/2013    Procedure: LAPAROSCOPIC HERNIORRHAPHY INGUINAL;  Laparoscopic inguinal hernia repair;  Surgeon: Andriy Sheehan MD;  Location: [facility] OR     LASIK  2007     MOUTH SURGERY  In his 20's    Petal Teeth and tooth pulled     OPEN REDUCTION INTERNAL FIXATION CLAVICLE  6/20/2014    Procedure: OPEN REDUCTION INTERNAL FIXATION CLAVICLE;  Surgeon: Manjinder Landon MD;  Location: [facility] OR       ALLERGIES     Allergies   Allergen Reactions     Ether      Told as a child -      Grass Extracts [Gramineae Pollens] Itching       SOCIAL / SUBSTANCE HISTORY     Social History     Socioeconomic History      Marital status:      Spouse name: Kasey     Number of children: 0     Years of education: Not on file     Highest education level: Not on file   Occupational History     Occupation:      Employer: SELF     Comment: Floor Covering   Tobacco Use     Smoking status: Former Smoker     Packs/day: 1.50     Years: 20.00     Pack years: 30.00     Quit date: 1995     Years since quittin.2     Smokeless tobacco: Never Used   Substance and Sexual Activity     Alcohol use: Yes     Comment: couple times per week - 2-3     Drug use: No     Sexual activity: Yes     Partners: Female   Other Topics Concern     Parent/sibling w/ CABG, MI or angioplasty before 65F 55M? Not Asked      Service Not Asked     Blood Transfusions Not Asked     Caffeine Concern Not Asked     Comment: 1 cup of coffee in the am and tea     Occupational Exposure Not Asked     Hobby Hazards Not Asked     Sleep Concern Not Asked     Stress Concern Yes     Comment: job related -     Weight Concern Not Asked     Special Diet Not Asked     Back Care Not Asked     Exercise Not Asked     Comment: every morning. Yoga.     Bike Helmet Not Asked     Seat Belt Not Asked     Self-Exams Not Asked   Social History Narrative     Not on file     Social Determinants of Health     Financial Resource Strain: Not on file   Food Insecurity: Not on file   Transportation Needs: Not on file   Physical Activity: Not on file   Stress: Not on file   Social Connections: Not on file   Intimate Partner Violence: Not on file   Housing Stability: Not on file       FAMILY HISTORY     Family History   Problem Relation Age of Onset     Cancer Mother         Brain   82 or 83     Diabetes Maternal Grandfather      MILAGRO Maternal Grandfather      Hypertension Maternal Grandfather      MILAGRO Brother 57        CABG, 3 vessel with valve replacement     Hypertension Brother      Allergies Brother         seasonal     Arthritis Sister      MILAGRO Father          "cabg, stents.      Diabetes Other         Uncle - fathers side of family     Asthma No family hx of      Cerebrovascular Disease No family hx of      Breast Cancer No family hx of      Cancer - colorectal No family hx of      Prostate Cancer No family hx of      Alcohol/Drug No family hx of      Alzheimer Disease No family hx of      Anesthesia Reaction No family hx of      Blood Disease No family hx of        REVIEW OF SYSTEMS   A comprehensive review of systems was negative except for items noted in HPI/Subjective.    PHYSICAL EXAMINATION   Temp (24hrs), Av.5  F (36.9  C), Min:98.1  F (36.7  C), Max:98.8  F (37.1  C)    Vital signs:  Temp: 98.8  F (37.1  C) Temp src: Oral BP: 101/68 Pulse: 80   Resp: 18 SpO2: 95 % O2 Device: Nasal cannula Oxygen Delivery: 4 LPM Height: 172.7 cm (5' 8\") Weight: 60.6 kg (133 lb 11.2 oz)  Estimated body mass index is 20.33 kg/m  as calculated from the following:    Height as of this encounter: 1.727 m (5' 8\").    Weight as of this encounter: 60.6 kg (133 lb 11.2 oz).        I/O last 3 completed shifts:  In: 1120 [P.O.:1120]  Out: 2725 [Urine:2725]    CONSTITUTIONAL/GENERAL APPEARANCE: Alert male. No Apparent Distress.  PSYCHIATRIC: Pleasant and appropriate mood and affect. Oriented x 3.  EARS, NOSE,THROAT,MOUTH: External ears and nose overall normal.   NECK: Neck appearance normal. No neck masses and the thyroid not enlarged.   RESPIRATORY: Non-labored effort. Decreased BS, crackles at bases  CARDIOVASCULAR: S1, S2, regular rate and rhythm.  ABDOMEN: round, soft, non-tender, non-distended, no palpable masses. No presence of hernia.  LYMPHATIC: no cervical or axillary lymphadenopathy.  SKIN: Skin color was normal. No clubbing or cyanosis. No palpable skin abnormalities.    LABORATORY ASSESSMENT    Arterial Blood Gas  Recent Labs   Lab 22  1518 22  1513 22  1354   PH 7.36  --   --    PCO2  --  70*  --    HCO3  --  42*  --    O2PER  --   --  3     CBC  Recent Labs "   Lab 04/05/22  0548 04/04/22  0335 04/03/22  0540 04/02/22  1254   WBC 9.0 10.9 12.6* 13.8*   RBC 4.07* 3.80* 3.81* 4.07*   HGB 12.0* 11.4* 11.4* 12.1*   HCT 39.1* 36.2* 36.2* 37.9*   MCV 96 95 95 93   MCH 29.5 30.0 29.9 29.7   MCHC 30.7* 31.5 31.5 31.9   RDW 13.1 13.2 13.6 13.4   * 438 414 515*     BMP  Recent Labs   Lab 04/05/22  0548 04/04/22  1045 04/04/22  0335 04/03/22  0540 04/02/22  1849 04/02/22  1254     --  136 140  --  138   POTASSIUM 4.1 3.6 3.4 3.7   < > 3.3*   CHLORIDE 91*  --  96 100  --  98   AMINATA 9.9  --  9.2 9.4  --  9.7   CO2 41*  --  36* 36*  --  32   BUN 11  --  16 13  --  14   CR 0.80  --  0.64* 0.70  --  0.66   *  --  115* 103*  --  134*    < > = values in this interval not displayed.     INRNo lab results found in last 7 days.   BNPNo lab results found in last 7 days.  VENOUS BLOOD GASES  Recent Labs   Lab 04/04/22  1518 04/02/22  1354   PHV  --  7.41   PCO2V  --  57*   PO2V  --  49*   HCO3V 42* 36*   MORA  --  9.4*         Additional labs and/or comments: No leukocytosis.     IMAGING      4/2/22 CT CHEST PULMONARY EMBOLISM W CONTRAST  COMPARISON: 2/7/2022.  ANGIOGRAM CHEST: Pulmonary arteries are normal caliber and negative for pulmonary emboli. Thoracic aorta is negative for dissection. No CT evidence of right heart strain.  LUNGS AND PLEURA: There is moderate to severe bilateral fibrosis. There is an increase in groundglass changes which may be an infectious or inflammatory infiltrate bilaterally. No effusions.  MEDIASTINUM/AXILLAE: Mildly enlarged lymph nodes which are nonspecific, similar to previous. No aneurysm.  CORONARY ARTERY CALCIFICATION: Severe.  UPPER ABDOMEN: No acute findings.  MUSCULOSKELETAL: No frankly destructive bony lesions.                                                         IMPRESSION:  1.  No pulmonary embolism demonstrated.  2.  Marked interval increase in groundglass infiltrates bilaterally in the setting of pulmonary fibrosis.    PFT & OTHER  TESTING     1/18/22 PFT:  IMPRESSION:   Six minute walk distance is reduced indicating a decrease in exercise tolerance.  Hypoxemia with ambulation on room air.   There is significant oxygen desaturation, without hypoxemia, during the six-minute walk done on supplemental oxygen at 3 L/m.   Moderately Severe Restriction   No significant change following bronchodilators but this does not rule out clinical efficacy.   Moderate diffusion defect.   The diffusing capacity was not corrected for the patient's hemoglobin.   No previous studies available for comparison.   Angela Jimenez MD    ASSESSMENT / PLAN      Pulmonary diagnoses:  Abnl CT/CXR R91.8  Hypoxemia R09.02  Pulmonary fibrosis NOS J84.10  Resp fail acute J96.00  Resp fail chronic J96.10  chronic hypersensitivity pneumonitis       ASSESSMENT : 72 year old male former smoker with history of chronic hypersensitivity pneumonitis with progressive fibrosis phenotype. He previously underwent extensive working and surgical lung biopsy at [facility]. He was placed on chronic immunosuppressant therapy with mycophenylate and prednisone, but he did not tolerate the mycophenylate and weaned himself off prednisone. He established care at [facility] pulmonology in January 2022, where ILD multidisciplinary discussion panel conferred with his diagnosis of chronic HP and recommended either resuming immunosuppression or starting OFEV antifibrotic. The patient declined those therapies. He has been on several prednisone tapers, most recently an extended course after hospitalization in February 2022 with COVID-19 pneumonia. He has been feeling worse the past several weeks and has been more hypoxic. He presented to the ED on 4/2/22 for evaluation. Tropnin and BNP elevated, and he was diuresed for possible CHF. CT Chest shows extensive new ground-glass opacities superimposed on his chronic fibrosis. 4/4/22 Coronary angiogram and RHC revealed no significant large-vessel CAD,  and intracardiac pressures low c/w overdiuresis. Clinical picture and CT chest are most consistent with acute exacerbation of his chronic hypersensitivity pneumonitis. No clear sign of infectious process.    PLAN:  Adjust oxygen, keep SaO2 > 90% - patient will need new home O2 concentrator that allows for flow rates up to 4 lpm.  Bronchodilators - Albuterol nebs prn.  Steroids - recommend Prednisone taper: 40 mg/d x 7 days, 30 mg/d x 7 days, 20 mg/d x 7 days, then continue 10 mg/d until seen in follow-up by Dr. Hammonds.  Antigen avoidance (carpet dusts).  Outpatient Pulmonary follow-up at [facility] with Dr. Hammonds as scheduled for 5/2/22 - consider alternate steroid-sparing agent (azathioprine) vs initiation of OFEV.  OK for discharge today from Pulmonary standpoint.    Case discussed with Dr. Hurst.    Thanks,      Pritesh Sparks M.D.    [facility]  [phone]   (pager)  [phone]   (office)

## 2022-04-05 NOTE — PLAN OF CARE
Cognition/Mentation: A/O x4    Neuro/CMS: intact    VS: stable, 4L O2 NC    Cardiac: NSR, PACs    Respiratory: LS diminished, fine crackles @ bases, some YEBOAH    GI/: continent    Activity: SBA    Pain: Denies    Drains/Tubes: PIV SL    Skin: intact    Other: angio site CDI, soft, no hematoma    Disposition: pending improvement

## 2022-04-05 NOTE — PLAN OF CARE
Goal Outcome Evaluation:    Neuro- A&OX4  Most Recent Vitals- Temp: 98.1  F (36.7  C) Temp src: Oral BP: 121/77 Pulse: 87   Resp: 18 SpO2: 95 % O2 Device: Nasal cannula   Tele/Cardiac- SR with pac's   Resp- 4L NSC   Activity- SBA  Pain- Denies   Drips- none   Drains/Tubes- P-IV SL  Skin- Right groin angio site CDI  GI/- on IV lasix with good urine out put   Aggression Color- Green  COVID status- COVID Recovered  Plan- IV lasix, med management   Misc-     Luis Alberto Durán RN

## 2022-04-05 NOTE — PROGRESS NOTES
Home Oxygen Assessment Tools  Patient's 02 sat on RA at rest 84 % for 2 minutes. Patient is on 3 LPM/%  (02 device) Sp02 89  %, after 2 minutes of standing on the side of the bed. If patient's Sp02 at rest is less than 88%, then oxygen may be needed and must monitor patient's Sp02 with activity. Patient 02 83 % sat on  RA with activity after 2 minutes. Patient's Sp02 93 % on 3 LPM/02% after 2 minutes of ( walkng activity).

## 2022-04-05 NOTE — PROGRESS NOTES
Writer contacted Alexandra with [facility] regarding change in oxygen prescription and faxed orders and supporting documentation to [facility] at: [phone].  Per Alexandra, their team will contact patient regarding change in home oxygen set up and delivery of new portable tank to hospital for discharge.    4:05 PM  Rec'd call from Erick with [facility] regarding new oxygen prescription.  Signed O2 order emailed via secure email to [facility] and per Erick, someone will bring new equipment to patient's home this evening once he arrives home.  Patient has his own portable tank in his room that can support 2L continuous O2 and patient states he feels comfortable using that for transport home.  Patient eager to discharge home.    Maria Luisa Hopper RN  Care Coordinator  [facility]  [phone] (text or call)

## 2022-04-05 NOTE — PROVIDER NOTIFICATION
Neuro- A&OX4  Most Recent Vitals- Temp: 98.5  F (36.9  C) Temp src: Oral BP: 118/79 Pulse: 74   Resp: 18 SpO2: 92 % O2 Device: Nasal cannula   Tele/Cardiac- SR   Resp- 2L NSC  Activity- SBA  Pain- Denies   Drips- none   Drains/Tubes- P-IV removed before discharge   Skin- Right groin angio site CDI, CMS intact, post care instructions given.   GI/- WDL  Aggression Color- Green  COVID status- Negative  Plan- Discharge instructions reviewed with pt and answered all questions. Pt expressed understanding of all discharge instructions and follow ups. Pt was discharged home with his wife at 1700.  [place]-     Luis Alberto Durán RN

## 2022-04-05 NOTE — DISCHARGE SUMMARY
[facility]    Hospitalist Discharge Summary       Date of Admission:  4/2/2022  Date of Discharge:  4/5/2022  Discharging Provider: Ang Hurst MD    Discharge Diagnoses   Acute on chronic hypoxic respiratory failure  Chronic hypercapnic respiratory failure  Probable flare of interstitial lung disease  Diffuse moderate coronary artery disease  Type 2 MI due to above  No congestive heart failure  Moderate Protein-Calorie Malnutrition     Follow-ups Needed After Discharge   Follow-up Appointments     Follow-up and recommended labs and tests       -Follow up with primary care provider, Elizabeth Kebede, within   7 days for hospital follow- up.  No follow up labs or test are needed.    - Follow up with Pulmonology Dr. Salas in a month    - Follow up with Cardiology in a month  *CLINIC WILL CONTACT YOU TO   SCHEDULE.    **SEE BELOW FOR ALL DETAILED FOLLOW UP APPOINTMENTS WITH PRIMARY MD AND   PULMONARY           Hospital Course   Josr Jamison is a 72 year old male with PMH interstitial lung disease on home O2, recent COVID-19 in Feb 2022, who was admitted on 4/2/2022 with progressively worsening YEBOAH and acute hypoxic respiratory failure. Has had gradually increasing dyspnea and oxygen needs at home in the preceding days to weeks. Initial assessment in ED was notable for elevated troponin 1400-->-->1048. His EKG was nonischemic. Echocardiogram on 4/3 shows normal EF but increased RV pressures and pulmonary hypertension. NT pro BNp was elevated. Cardiology was consulted and initially there was suspicion for a diastolic congestive heart failure exacerbation, and he was diuresed. An angiogram on 4/4/2022 showed diffuse moderate coronary artery disease without any evidence of acute MI, and normal pulmonary artery pressures. Based on cardiac catheterization Cardiology felt patient's issues were likely primarily pulmonary and that he did not have heart failure.   Pulmonology was  "consulted for further assistance. Patient has known ILD and follows with [facility] and the [facility] with Dr. Salas. 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 He has been on Steroids and Cellcept in the past but was weaned off of it recently and had also declined Ofev.   CT on admission was notable for \"marked interval increase in groundglass infltrates bilaterally in the setting of pulmonary fibrosis.\" Pulmonary medicine felt this was most consistent with an ILD flare. He is started on steroids. Home oxygen assessment noted 3L O2 requirements at rest and 4L with activity. A new oxygen device capable of 4L O2 to be provided at discharge.  - Steroid course with slow taper  - Follow up with Cardiology, Pulmonology, PCP  - Note chronic hypercapnea this hospital stay, this should be followed up with Pulmonary medicine      Consultations This Hospital Stay   PHARMACY IP CONSULT  PHARMACY IP CONSULT  CARDIOLOGY IP CONSULT  PHARMACY IP CONSULT  PHARMACY IP CONSULT  PULMONARY IP CONSULT  PULMONARY IP CONSULT  SMOKING CESSATION PROGRAM IP CONSULT    Code Status   Full Code    Time Spent on this Encounter   I, Ang Hurst, personally saw the patient today and spent approximately 45 minutes discharging this patient.       Ang Hurst MD  [facility]  ______________________________________________________________________    Physical Exam   Vital Signs: Temp: 98.8  F (37.1  C) Temp src: Oral BP: 115/73 Pulse: 86   Resp: 18 SpO2: 92 % O2 Device: Nasal cannula Oxygen Delivery: 3 LPM  Weight: 133 lbs 11.2 oz    Constitutional: Thin male in NAD  HEENT: Eyes nonicteric, oral mucosa moist  Cardiovascular: RRR, normal S1/2, no m/r/g  Respiratory: Decreased air flow throughout, clear other than fine crackles basilar bilaterally  Vascular: No LE pitting edema  GI: Normoactive bowel sounds, nontender  Skin/Integumen: No " juan  Neuro/Psych: Appropriate affect and mood. A&Ox3, moves all extremities     Primary Care Physician   Elizabeth Kebede    Discharge Disposition   Discharged to home  Condition at discharge: Stable    Significant Results and Procedures   Most Recent 3 CBC's:  Recent Labs   Lab Test 04/05/22  0548 04/04/22  0335 04/03/22  0540   WBC 9.0 10.9 12.6*   HGB 12.0* 11.4* 11.4*   MCV 96 95 95   * 438 414     Most Recent 3 BMP's:  Recent Labs   Lab Test 04/05/22  0548 04/04/22  1045 04/04/22  0335 04/03/22  0540     --  136 140   POTASSIUM 4.1 3.6 3.4 3.7   CHLORIDE 91*  --  96 100   CO2 41*  --  36* 36*   BUN 11  --  16 13   CR 0.80  --  0.64* 0.70   ANIONGAP 2*  --  4 4   AMINATA 9.9  --  9.2 9.4   *  --  115* 103*     Most Recent 2 LFT's:  Recent Labs   Lab Test 04/05/22  0548 04/04/22  0335   AST 22 20   ALT 22 23   ALKPHOS 56 54   BILITOTAL 0.4 0.2     Most Recent 3 INR's:  Recent Labs   Lab Test 04/21/21  0740   INR 1.00     Most Recent 3 Troponin's:No lab results found.  Most Recent 3 BNP's:  Recent Labs   Lab Test 04/02/22  1254 02/07/22  1741   NTBNPI 4,369* 834     Most Recent D-dimer:  Recent Labs   Lab Test 04/02/22  1254   DD 0.62*     Most Recent Cholesterol Panel:  Recent Labs   Lab Test 05/02/14  0911   CHOL 234*   *   HDL 47   TRIG 224*       Results for orders placed or performed during the hospital encounter of 04/02/22   CT Chest Pulmonary Embolism w Contrast    Narrative    EXAM: CT CHEST PULMONARY EMBOLISM W CONTRAST  LOCATION: [facility]  DATE/TIME: 4/2/2022 2:37 PM    INDICATION: Shortness of breath.  COMPARISON: 2/7/2022.  TECHNIQUE: CT chest pulmonary angiogram during arterial phase injection of IV contrast. Multiplanar reformats and MIP reconstructions were performed. Dose reduction techniques were used.   CONTRAST: 57mL Isovue 370        FINDINGS:  ANGIOGRAM CHEST: Pulmonary arteries are normal caliber and negative for pulmonary  emboli. Thoracic aorta is negative for dissection. No CT evidence of right heart strain.    LUNGS AND PLEURA: There is moderate to severe bilateral fibrosis. There is an increase in groundglass changes which may be an infectious or inflammatory infiltrate bilaterally. No effusions.    MEDIASTINUM/AXILLAE: Mildly enlarged lymph nodes which are nonspecific, similar to previous. No aneurysm.    CORONARY ARTERY CALCIFICATION: Severe.    UPPER ABDOMEN: No acute findings.    MUSCULOSKELETAL: No frankly destructive bony lesions.      Impression    IMPRESSION:  1.  No pulmonary embolism demonstrated.  2.  Marked interval increase in groundglass infiltrates bilaterally in the setting of pulmonary fibrosis.   Echocardiogram Complete     Value    LVEF  60-65%    Narrative    [id number]  [id number]  [id number]  [id number]^MILLICENT^RASHAAD^ANNALEE     [facility]  Echocardiography Laboratory  [address] [place] [address]     Name: KALPANA JAIME  MRN: [id number]  : 1950  Study Date: 2022 10:56 AM  Age: 72 yrs  Gender: Male  Patient Location: [facility]  Reason For Study: SOB  Ordering Physician: RASHAAD RICKS  Referring Physician: Elizabeth Kebede  Performed By: Onel Guerrero     BSA: 1.7 m2  Height: 68 in  Weight: 135 lb  HR: 68  BP: 98/72 mmHg  ______________________________________________________________________________  Procedure  Complete Portable Echo Adult. Optison (NDC #[id number]) given intravenously.  ______________________________________________________________________________  Interpretation Summary     Right ventricular systolic pressure is elevated, consistent with moderate  pulmonary hypertension.  Flattened septum is consistent with RV pressure overload.  The right ventricle is mildly dilated.  Mildly decreased right ventricular systolic function  Left ventricular systolic function is normal.  The visual ejection fraction is 60-65%.  The left ventricle is normal in  size.  There is mild (1+) mitral regurgitation.     No old studies available for comparison     ______________________________________________________________________________  Left Ventricle  The left ventricle is normal in size. There is normal left ventricular wall  thickness. Left ventricular systolic function is normal. The visual ejection  fraction is 60-65%. Left ventricular diastolic function is indeterminate.  Flattened septum is consistent with RV pressure overload. There is no thrombus  seen in the left ventricle.     Right Ventricle  The right ventricle is mildly dilated. Mildly decreased right ventricular  systolic function. There is no mass or thrombus in the right ventricle.     Atria  Normal left atrial size. Right atrial size is normal. There is no color  Doppler evidence of an atrial shunt. The left atrial appendage is not well  visualized.     Mitral Valve  The mitral valve leaflets appear normal. There is no evidence of stenosis,  fluttering, or prolapse. There is mild (1+) mitral regurgitation. There is no  mitral valve stenosis.     Tricuspid Valve  Normal tricuspid valve. There is mild (1+) tricuspid regurgitation. Right  ventricular systolic pressure is elevated, consistent with moderate pulmonary  hypertension. There is no tricuspid stenosis.     Aortic Valve  The aortic valve is trileaflet. No aortic regurgitation is present. No aortic  stenosis is present.     Pulmonic Valve  Normal pulmonic valve. There is no pulmonic valvular regurgitation. There is  no pulmonic valvular stenosis.     Vessels  The aortic root is normal size. The ascending aorta is Mildly dilated.  Inferior vena cava not well visualized for estimation of right atrial  pressure. The pulmonary artery is not well visualized, but is probably normal  size.     Pericardium  The pericardium appears normal. There is no pleural effusion.     Rhythm  Sinus rhythm was noted. The patient exhibited occasional  PACs.  ______________________________________________________________________________  MMode/2D Measurements & Calculations  IVSd: 0.96 cm     LVIDd: 4.4 cm  LVIDs: 3.5 cm  LVPWd: 1.1 cm  FS: 19.1 %  LV mass(C)d: 147.9 grams  LV mass(C)dI: 85.5 grams/m2  Ao root diam: 3.7 cm  LA dimension: 3.6 cm  asc Aorta Diam: 3.8 cm  LA/Ao: 0.99  LVOT diam: 2.2 cm  LVOT area: 3.9 cm2  LA Volume (BP): 70.0 ml  LA Volume Index (BP): 40.5 ml/m2  RWT: 0.49     Doppler Measurements & Calculations  MV E max troy: 63.8 cm/sec  MV A max troy: 61.4 cm/sec  MV E/A: 1.0  MV dec slope: 393.7 cm/sec2  TR max troy: 350.2 cm/sec  TR max P.1 mmHg  E/E' av.3  Lateral E/e': 6.6  Medial E/e': 12.0     ______________________________________________________________________________  Report approved by: Dr. Vishnu Balderrama 2022 04:28 PM         Cardiac Catheterization    Narrative      Normal PA pressures.    Left sided filling pressures are normal.    Left ventricular filling pressures are normal.    Ost LM to Mid LM lesion is 30% stenosed.    1st Mrg lesion is 50% stenosed.    Prox RCA to Mid RCA lesion is 30% stenosed.    RPDA lesion is 60% stenosed.    Prox LAD to Mid LAD lesion is 35% stenosed.    2nd Diag lesion is 70% stenosed.     1.  Heavily calcified coronary arteries.   Numerous moderate to severe and   branch vessel stenoses, but no major epicardial coronary stenosis.  2.  Filling pressures are low.   RA mean 1mmHg  PCWP mean 1mmHg  PA 24/6, mean 15mmHg  3.  Estimated Susy cardiac output is normal, CO 5.9L/min, CI 3.4L/min/m2.         Discharge Orders      Follow-Up with Cardiology FRANCESCO      Reason for your hospital stay    You were hospitalized for an exacerbation of your lung disease. Your heart was tested and you have heart disease but it did not cause your lung symptoms.     Follow-up and recommended labs and tests     -Follow up with primary care provider, Elizabeth Kebede, within 7 days for hospital follow- up.   No follow up labs or test are needed.    - Follow up with Pulmonology Dr. Salas in a month    - Follow up with Cardiology in a month  *CLINIC WILL CONTACT YOU TO SCHEDULE.    **SEE BELOW FOR ALL DETAILED FOLLOW UP APPOINTMENTS WITH PRIMARY MD AND PULMONARY     Activity    Your activity upon discharge: activity as tolerated     Oxygen Adult/Peds    Oxygen Documentation:   I certify that this patient, Josr Jamison has been under my care (or a nurse practitioner or physican's assistant working with me). This is the face-to-face encounter for oxygen medical necessity.      Josr Jamison is now in a chronic stable state and continues to require supplemental oxygen. Patient has continued oxygen desaturation due to Pulmonary Fibrosis J84.10.    Alternative treatment(s) tried or considered and deemed clinically infective for treatment of Pulmonary Fibrosis J84.10 include nebulizers, inhalers, steroids, and pulmonary toileting.  If portability is ordered, is the patient mobile within the home? yes    **Patients who qualify for home O2 coverage under the CMS guidelines require ABG tests or O2 sat readings obtained closest to, but no earlier than 2 days prior to the discharge, as evidence of the need for home oxygen therapy. Testing must be performed while patient is in the chronic stable state. See notes for O2 sats.**     Diet    Follow this diet upon discharge: Regular diet adult     Discharge Medications   Current Discharge Medication List      CONTINUE these medications which have CHANGED    Details   albuterol (PROVENTIL) (2.5 MG/3ML) 0.083% neb solution Take 1 vial (2.5 mg) by nebulization every 4 hours as needed for shortness of breath / dyspnea or wheezing  Qty: 90 mL, Refills: 0    Associated Diagnoses: Acute respiratory failure with hypoxia (H)      predniSONE (DELTASONE) 10 MG tablet Take 4 tablets (40 mg) by mouth daily for 6 days, THEN 3 tablets (30 mg) daily for 7 days, THEN 2 tablets (20 mg) daily for 7  days, THEN 1 tablet (10 mg) daily.  Qty: 89 tablet, Refills: 0    Associated Diagnoses: Acute respiratory failure with hypoxia (H)         CONTINUE these medications which have NOT CHANGED    Details   albuterol (PROAIR HFA/PROVENTIL HFA/VENTOLIN HFA) 108 (90 Base) MCG/ACT inhaler Inhale 2 puffs into the lungs every 6 hours      B Complex Vitamins (VITAMIN B-COMPLEX) TABS Take 1 tablet by mouth daily      Fe Fum-FA-B Rnw-T-Gb-Mg-Mn-Cu (HEMATINIC PLUS VIT/MINERALS) 106-1 MG TABS Take 1 tablet by mouth daily      lidocaine-prilocaine (EMLA) 2.5-2.5 % external cream Apply topically as needed for moderate pain  Qty: 5 g, Refills: 3    Associated Diagnoses: Needle phobia      LORazepam (ATIVAN) 1 MG tablet Take 0.5-1 tablets (0.5-1 mg) by mouth every 8 hours as needed for anxiety  Qty: 3 tablet, Refills: 0    Associated Diagnoses: Anxiety attack      methylphenidate (METADATE ER) 20 MG CR tablet Take 20 mg by mouth 4 times daily   Qty:        metroNIDAZOLE (METROGEL) 0.75 % external gel Apply 1 Application topically daily       UNABLE TO FIND Gummy form of Tumeric, Electrolyte (Magnesium, Calcium,Zinc). Calm gummy at bedtime, Lung protocol from [employer].      Vitamin D, Cholecalciferol, 25 MCG (1000 UT) TABS Take 2,000 Units by mouth daily      zinc sulfate (ZINCATE) 220 (50 Zn) MG capsule Take 220 mg by mouth daily           Allergies   Allergies   Allergen Reactions     Ether      Told as a child -      Grass Extracts [Gramineae Pollens] Itching

## 2022-04-05 NOTE — DISCHARGE INSTRUCTIONS
Cardiac Angiogram Discharge Instructions - Femoral    After you go home:      Have an adult stay with you until tomorrow.    Drink extra fluids for 2 days.    You may resume your normal diet.    No smoking       For 24 hours - due to the sedation you received:    Relax and take it easy.    Do NOT make any important or legal decisions.    Do NOT drive or operate machines at home or at work.    Do NOT drink alcohol.    Care of Groin Puncture Site:      For the first 24 hrs - check the puncture site every 1-2 hours while awake.    For 2 days, when you cough, sneeze, laugh or move your bowels, hold your hand over the puncture site and press firmly.    Remove the bandaid after 24 hours. If there is minor oozing, apply another bandaid and remove it after 12 hours.    It is normal to have a small bruise or pea size lump at the site.    You may shower tomorrow. Do NOT take a bath, or use a hot tub or pool for at least 3 days. Do NOT scrub the site. Do not use lotion or powder near the puncture site.    Activity:            For 2 days:    No stooping or squatting    Do NOT do any heavy activity such as exercise, lifting, or straining.     No housework, yard work or any activity that make you sweat    Do NOT lift more than 10 pounds    Bleeding:      If you start bleeding from the site in your groin, lie down flat and press firmly on/above the site for 10 minutes.     Once bleeding stops, lay flat for 2 hours.     Call [facility] as soon as you can.       Call 911 right away if you have heavy bleeding or bleeding that does not stop.      Medicines:      If you are taking an antiplatelet medication such as Plavix, Brilinta or Effient, do not stop taking it until you talk to your cardiologist.      If you are on Metformin (Glucophage), do not restart it until you have blood tests (within 2 to 3 days after discharge).  After you have your blood drawn, you may restart the Metformin.     Take your medications, including blood  thinners, unless your provider tells you not to.      If you take Coumadin (Warfarin), have your INR checked by your provider in  3-5 days. Call your clinic to schedule this.    If you have stopped any medicines, check with your provider about when to restart them.    Follow Up Appointments:      Follow up with [facility] Nurse Practitioner at [facility] of patient preference in 7-10 days.    Call the clinic if:      You have increased pain or a large or growing hard lump around the site.    The site is red, swollen, hot or tender.    Blood or fluid is draining from the site.    You have chills or a fever greater than 101 F (38 C).    Your leg feels numb, cool or changes color.    You have hives, a rash or unusual itching.    New pain in the back or belly that you cannot control with Tylenol.    Any questions or concerns.          [facility] [place]:    [phone] [facility] (7 days a week)

## 2022-04-06 NOTE — PROGRESS NOTES
Clinic Care Coordination Contact  [facility]/Voicemail       Clinical Data: Care Coordinator Outreach  Outreach attempted x 1.  Left message on patient's voicemail with call back information and requested return call.  Plan: Care Coordinator will try to reach patient again in 1-2 business days.    Julia Muhammad  Community Health Worker  [facility]  [phone]

## 2022-04-06 NOTE — TELEPHONE ENCOUNTER
Called patient back to schedule appointment with Dr Reyes. Patient agreed to call back clinic with any questions.     Moe Mayorga, EMT  Clinic Support  [facility]    [phone]    Employed by [facility]

## 2022-04-07 NOTE — TELEPHONE ENCOUNTER
Patient was evaluated by cardiology while inpatient for increased YEBOAH and home 02 needs, acute resp failure with hypoxia and elevated troponin-NSTEMI. PMH: interstitial lung disease on home O2, recent COVID-19 in Feb 2022. Echo showed EF of 60% without WMA's. 4/4/22: RHC via RFV showed normal right and left filling pressures. LHC via RFA showed diffuse moderate coronary artery disease without any evidence of acute MI-non cardiac related origin-probable pulmonary related. Pt was started on Prednisone at time of discharge. Writer attempted to call patient to discuss any post hospital d/c questions he may have, review medication changes, and confirm f/u appts, but no answer. VM left to call back with any non-emergent cardiac related or medication questions. Reminder left for pt that RFV and RFA cardiac cath sites should be without bleeding, swelling, redness or signs of infection. RN left reminder for patient that he should follow up with PMD and pulmonology as recommended. MIRI Wheeler RN.

## 2022-04-07 NOTE — PROGRESS NOTES
Clinic Care Coordination Contact  [facility]/Voicemail       Clinical Data: Care Coordinator Outreach  Outreach attempted x 2.  Left message on patient's voicemail with call back information and requested return call.  Plan: Care Coordinator will do no further outreaches at this time.    Julia Muhammad  Community Health Worker  [facility]  [phone]

## 2022-04-11 NOTE — PROGRESS NOTES
Answers for HPI/ROS submitted by the patient on 4/11/2022  How many servings of fruits and vegetables do you eat daily?: 2-3  On average, how many sweetened beverages do you drink each day (Examples: soda, juice, sweet tea, etc.  Do NOT count diet or artificially sweetened beverages)?: 0  How many minutes a day do you exercise enough to make your heart beat faster?: 60 or more  How many days a week do you exercise enough to make your heart beat faster?: 7  How many days per week do you miss taking your medication?: 0  What is the reason for your visit today?: Follow up from hospitalization  When did your symptoms begin?: More than a month  What are your symptoms?: Low oxygen levels  How would you describe these symptoms?: Severe  Are your symptoms:: Improving  Have you had these symptoms before?: Yes  Have you tried or received treatment for these symptoms before?: Yes  Did that treatment work? : Yes  Please describe the treatment you had:: Prednisone, anti-inflammatories, antioxidants  Is there anything that makes you feel worse?: Activity with Mental and physical stress  Is there anything that makes you feel better?: Exercise    Sean is a 72 year old who is being evaluated via a billable video visit.      How would you like to obtain your AVS? [employer]  If the video visit is dropped, the invitation should be resent by: Text to cell phone: [phone]  Will anyone else be joining your video visit? No      Video Start Time: 440 PM    Assessment & Plan     ILD (interstitial lung disease) (H)  He has interstitial lung disease  He was stable up until February when he had Covid  He was admitted to the hospital in February and then was discharged with oxygen and a short course of steroids  He was discharged on 3 L and was slowly being weaned off  Around a week ago he started on increasing shortness of breath  He was readmitted to the hospital  There he was found to have increasing groundglass opacities compared to the  past CTs  He also had elevated troponin  He had a echocardiogram which showed moderate pulmonary hypertension and also he had a cardiac cath done due to elevated troponin which showed moderate coronary artery disease but no stents were placed since there are no flow-limiting lesions  In any event all his symptoms were then thought to be from the ILD exacerbation  He was discharged on a tapering dose of steroids  He continues to have cough and some phlegm but he is not able to cough it up  He is concerned there could be some infection  We can certainly do a procalcitonin and a CBC although the CBC might show elevated white cell counts due to the fact that he is on steroids  He did have some thrombocythemia on the blood work that was done  He had an upcoming pulmonary appointment  I advised him to discuss with pulmonologist about the worsening CP  He continues to be on a tapering dose of steroids  - Procalcitonin; Future  - CBC with platelets and differential; Future    Shortness of breath  This is thought to be from the underlying pulmonary process and cardiac cause was ruled out  Initially they thought he could be having some diastolic heart failure as he was elevated BNP and elevated troponin   He was diuresed in the hospital but was not discharged on any diuretics  - Procalcitonin; Future  - CBC with platelets and differential; Future    Acute respiratory failure with hypoxia (H)  Currently he is slowly weaning off oxygen and only on 1.5 L 24 hours a day        30 minutes spent on the date of the encounter doing chart review, history and exam, documentation and further activities per the note           Return in about 4 weeks (around 5/9/2022).    Simon Torres MD  [facility] XANDER Estrada is a 72 year old who presents for the following health issues     History of Present Illness       Reason for visit:  Follow up from hospitalization  Symptom onset:  More than a month  Symptoms  include:  Low oxygen levels  Symptom intensity:  Severe  Symptom progression:  Improving  Had these symptoms before:  Yes  Has tried/received treatment for these symptoms:  Yes  Previous treatment was successful:  Yes  Prior treatment description:  Prednisone, anti-inflammatories, antioxidants  What makes it worse:  Activity with Mental and physical stress  What makes it better:  Exercise    He eats 2-3 servings of fruits and vegetables daily.He consumes 0 sweetened beverage(s) daily.He exercises with enough effort to increase his heart rate 60 or more minutes per day.  He exercises with enough effort to increase his heart rate 7 days per week.   He is taking medications regularly.         Hospital Follow-up Visit:    Hospital/Nursing Home/IP Rehab Facility: [facility]  Date of Admission: 4/5/22  Date of Discharge: 4/8/22  Reason(s) for Admission:  increasing shortness of breath      Was your hospitalization related to COVID-19? No   Problems taking medications regularly:  None  Medication changes since discharge: None  Problems adhering to non-medication therapy:  None    Summary of hospitalization:  [facility] discharge summary reviewed  Diagnostic Tests/Treatments reviewed.  Follow up needed: With pulmonary and cardiology  Other Healthcare Providers Involved in Patient s Care:         Specialist appointment - With pulmonary on the 21st  Update since discharge: stable.       Post Discharge Medication Reconciliation: discharge medications reconciled, continue medications without change.  Plan of care communicated with patient                Review of Systems   Constitutional, HEENT, cardiovascular, pulmonary, gi and gu systems are negative, except as otherwise noted.      Objective           Vitals:  No vitals were obtained today due to virtual visit.    Physical Exam   GENERAL: Healthy, alert and no distress  EYES: Eyes grossly normal to inspection.  No discharge or  erythema, or obvious scleral/conjunctival abnormalities.  RESP: No audible wheeze, cough, or visible cyanosis.  No visible retractions or increased work of breathing.    SKIN: Visible skin clear. No significant rash, abnormal pigmentation or lesions.  NEURO: Cranial nerves grossly intact.  Mentation and speech appropriate for age.  PSYCH: Mentation appears normal, affect normal/bright, judgement and insight intact, normal speech and appearance well-groomed.                Video-Visit Details    Type of service:  Video Visit    Video End Time:520 PM  Visit was initially started telephone  as a video visit but later flipped to Telephone as the connection was poor    Originating Location (pt. Location): Home    Distant Location (provider location):  [facility]     Platform used for Video Visit: [employer]

## 2022-04-12 NOTE — TELEPHONE ENCOUNTER
Patient returned call to the clinic and was scheduled for lab only appointment.     Nancy Roth RN [facility]

## 2022-04-12 NOTE — TELEPHONE ENCOUNTER
Message left for patient to return call to schedule lab appointment.    Please transfer call to CATERINA Gaines at [phone].

## 2022-04-21 NOTE — PROGRESS NOTES
"Josr Jamison's goals for this visit include: Return  He requests these members of his care team be copied on today's visit information: PCP    PCP: Elizabeth Kebede    Referring Provider:  No referring provider defined for this encounter.    BP (!) 146/76   Pulse 64   Resp 16   Ht 1.727 m (5' 8\")   Wt 60.3 kg (133 lb)   SpO2 93%   BMI 20.22 kg/m      Do you need any medication refills at today's visit? SUKUMAR Cao LPN  Pulmonary Medicine:  [facility]  Phone: [phone] Fax: [phone]      "

## 2022-04-21 NOTE — PROGRESS NOTES
Pulmonary Clinic Return Patient Visit  Reason for Visit: Pulmonary fibrosis  History of Present Illness  Elton Jamison is a 72-year-old male (former smoker, 38-pack-years) who presents to clinic for follow up for pulmonary fibrosis (presumed chronic hypersensitivity pneumonitis- biopsy proven). I last saw him in clinic in 01/2022.  To briefly review, progressive SOB and worsening hypoxemia over the last 1- 2 years with several admissions for hypoxic respiratory failure. Initial admission in 2020 was for viral syndrome however RSV and COVID swab was negative. He was previously seen at the ILD clinic over at [place] and extensive work up including ILD panel was negative. Surgical lung biopsy performed in 2021 which showed UIP-like pattern with abundant inflammatory cell infiltrates, organizing pneumonia, and focal diffuse alveolar damage. He was tried on prolonged steroids and Cellcept and [place] but patient weaned himself off the medication due to poor tolerance.  When I saw him, I reviewed his imaging and the pattern of fibrosis was in keeping with chronic hypersensitivity pneumonitis. I did discuss his case at the ILD conference and the consensus was a trial of anti-fibrotrics - ofev. However, patient declined using the medication even after extensive counseling about the side effects. Sean is hesitant towards medications in general.  He has been hospitalized twice this year, first in 02/2022 for COVID 19 pneumonia and he was treated with steroids which left him dependent on oxygen supplementation at 2 LPM. This appears to have set him back as he had made some initial progress when I saw him in 01/2022 and treated him with a prolonged steroid taper.   He was hospitalized at [facility] in 4/2022 for elevated troponins and concern for CHF exacerbation. He was initially thought to have NSTEMI with CHF exacerbation and was diuresed. However, On 4/4/22 underwent coronary angiogram and RHC. Intracardiac pressures, including  PCWP and PA pressures, were low on the right heart catheterization yesterday, indicating that this was not likely acute heart failure and the patient is hypovolemic (overdiuresed).  Echo shows normal wall motion, with ejection fraction of 60 to 65%.  Coronary angiogram showed no significant large vessel CAD, probably diffuse, small vessel disease. This was a small demand ischemic event caused by hypoxia combined with small vessel coronary disease. Diuresis has been stopped.  He was eventually discharged on steroid taper and 4 LPM of O2 continuous and he has made significant improvement. With the help of a finger pulse Ox, he weaned his O2 down to 2 LPM with exertion and his O2 sats are > 90%. He is less SOB and he has less pleuritic chest pain. Exercise tolerance has improved and he is slowly getting back to exercising which includes cyclical calisthenics. His steroid taper is now down to 40 mg prednisone.  Remote history of smoking, 38 pack years of smoking, quit in 1996.  Longtime history of working with carpets.  Sean has been exposed to a lot of carpet dust and he never wore any protective gears or respiratory filters.  No family history of lung cancer.    Review of Systems:  10 of 14 systems reviewed and are negative unless otherwise stated in HPI.    Past Medical History:   Diagnosis Date     ADD (attention deficit disorder)      ADD (attention deficit disorder)      CARDIOVASCULAR SCREENING; LDL GOAL LESS THAN 130 2/22/2013    [place] Risk Score: 12% (13 Total Points)      Cataract     bilateral     Clavicle fracture      Foot fracture, right      Horseback riding age 27    bucked off horse, torn urethra, catheter for a time     Hyperlipidemia LDL goal <130 2/22/2013    [place] Risk Score: 12% (13 Total Points)      Hypertension goal BP (blood pressure) < 140/90 4/4/2013    Attempting to control with diet and exercise     Lung disease        Past Surgical History:   Procedure Laterality Date      CATARACT IOL, RT/LT      bilateral     COLONOSCOPY  2013     CV CORONARY ANGIOGRAM N/A 2022    Procedure: Coronary Angiogram;  Surgeon: Iveth Coleman MD;  Location:  HEART CARDIAC CATH LAB     CV PCI N/A 2022    Procedure: Percutaneous Coronary Intervention;  Surgeon: Iveth Coleman MD;  Location:  HEART CARDIAC CATH LAB     CV RIGHT HEART CATH MEASUREMENTS RECORDED N/A 2021    Procedure: CV RIGHT HEART CATH;  Surgeon: Amador English MD;  Location:  HEART CARDIAC CATH LAB     CV SWAN DANIAL PROCEDURE N/A 2022    Procedure: Alma Danial Procedure;  Surgeon: Iveth Coleman MD;  Location:  HEART CARDIAC CATH LAB     HERNIORRHAPHY INGUINAL Right 2016    Procedure: HERNIORRHAPHY INGUINAL;  Surgeon: Andriy Sheehan MD;  Location:  OR     LAPAROSCOPIC HERNIORRHAPHY INGUINAL  2013    Procedure: LAPAROSCOPIC HERNIORRHAPHY INGUINAL;  Laparoscopic inguinal hernia repair;  Surgeon: Andriy Sheehan MD;  Location:  OR     LASIK  2007     MOUTH SURGERY  In his 20's    [place] and tooth pulled     OPEN REDUCTION INTERNAL FIXATION CLAVICLE  2014    Procedure: OPEN REDUCTION INTERNAL FIXATION CLAVICLE;  Surgeon: Manjinder Landon MD;  Location: [facility] OR       Family History   Problem Relation Age of Onset     Cancer Mother         Brain   82 or 83     Diabetes Maternal Grandfather      C.A.D. Maternal Grandfather      Hypertension Maternal Grandfather      C.A.D. Brother 57        CABG, 3 vessel with valve replacement     Hypertension Brother      Allergies Brother         seasonal     Arthritis Sister      C.A.D. Father         cabg, stents.      Diabetes Other         Uncle - fathers side of family     Asthma No family hx of      Cerebrovascular Disease No family hx of      Breast Cancer No family hx of      Cancer - colorectal No family hx of      Prostate Cancer No family hx of      Alcohol/Drug No family hx of      Alzheimer Disease No family hx of       Anesthesia Reaction No family hx of      Blood Disease No family hx of        Social History     Socioeconomic History     Marital status:      Spouse name: Kasey     Number of children: 0     Years of education: None     Highest education level: None   Occupational History     Occupation:      Employer: SELF     Comment: Floor Covering   Tobacco Use     Smoking status: Former Smoker     Packs/day: 1.50     Years: 20.00     Pack years: 30.00     Quit date: 1995     Years since quittin.0     Smokeless tobacco: Never Used   Substance and Sexual Activity     Alcohol use: Yes     Comment: couple times per week - 2-3     Drug use: No     Sexual activity: Yes     Partners: Female   Other Topics Concern     Parent/sibling w/ CABG, MI or angioplasty before 65F 55M? Not Asked      Service Not Asked     Blood Transfusions Not Asked     Caffeine Concern Not Asked     Comment: 1 cup of coffee in the am and tea     Occupational Exposure Not Asked     Hobby Hazards Not Asked     Sleep Concern Not Asked     Stress Concern Yes     Comment: job related -     Weight Concern Not Asked     Special Diet Not Asked     Back Care Not Asked     Exercise Not Asked     Comment: every morning. Yoga.     Bike Helmet Not Asked     Seat Belt Not Asked     Self-Exams Not Asked   Social History Narrative     None     Social Determinants of Health     Financial Resource Strain: Not on file   Food Insecurity: Not on file   Transportation Needs: Not on file   Physical Activity: Not on file   Stress: Not on file   Social Connections: Not on file   Intimate Partner Violence: Not on file   Housing Stability: Not on file         Allergies   Allergen Reactions     Ether      Told as a child -      Grass Extracts [Gramineae Pollens] Itching         Current Outpatient Medications:      albuterol (PROAIR HFA/PROVENTIL HFA/VENTOLIN HFA) 108 (90 Base) MCG/ACT inhaler, Inhale 2 puffs into the lungs every 6 hours, Disp: , Rfl:  "     albuterol (PROVENTIL) (2.5 MG/3ML) 0.083% neb solution, Take 1 vial (2.5 mg) by nebulization every 4 hours as needed for shortness of breath / dyspnea or wheezing, Disp: 90 mL, Rfl: 0     B Complex Vitamins (VITAMIN B-COMPLEX) TABS, Take 1 tablet by mouth daily, Disp: , Rfl:      Fe Fum-FA-B Npi-M-Fq-Mg-Mn-Cu (HEMATINIC PLUS VIT/MINERALS) 106-1 MG TABS, Take 1 tablet by mouth daily, Disp: , Rfl:      lidocaine-prilocaine (EMLA) 2.5-2.5 % external cream, Apply topically as needed for moderate pain, Disp: 5 g, Rfl: 3     LORazepam (ATIVAN) 1 MG tablet, Take 0.5-1 tablets (0.5-1 mg) by mouth every 8 hours as needed for anxiety, Disp: 3 tablet, Rfl: 0     methylphenidate (METADATE ER) 20 MG CR tablet, Take 20 mg by mouth 4 times daily , Disp:  , Rfl:      metroNIDAZOLE (METROGEL) 0.75 % external gel, Apply 1 Application topically daily , Disp: , Rfl:      predniSONE (DELTASONE) 10 MG tablet, Take 4 tablets (40 mg) by mouth daily for 6 days, THEN 3 tablets (30 mg) daily for 7 days, THEN 2 tablets (20 mg) daily for 7 days, THEN 1 tablet (10 mg) daily., Disp: 89 tablet, Rfl: 0     UNABLE TO FIND, Gummy form of Tumeric, Electrolyte (Magnesium, Calcium,Zinc). Calm gummy at bedtime, Lung protocol from [employer]., Disp: , Rfl:      Vitamin D, Cholecalciferol, 25 MCG (1000 UT) TABS, Take 2,000 Units by mouth daily, Disp: , Rfl:      zinc sulfate (ZINCATE) 220 (50 Zn) MG capsule, Take 220 mg by mouth daily, Disp: , Rfl:       Physical Exam:  BP (!) 146/76   Pulse 84   Resp 16   Ht 1.727 m (5' 8\")   Wt 60.3 kg (133 lb)   SpO2 91%   BMI 20.22 kg/m    GENERAL: Well developed, well nourished, alert, and in no apparent distress.  HEENT: Normocephalic, atraumatic. PERRL, EOMI. Oral mucosa is moist. No perioral cyanosis.  NECK: supple, no masses, no thyromegaly.  RESP:  Fine inspiratory crackles more prominent in the upper lobes.  No rales, wheezes, rhonchi.  No cyanosis or clubbing.  CV: Normal S1, S2, regular " rhythm, normal rate. No murmur.  No LE edema.   ABDOMEN:  Soft, non-tender, non-distended.   SKIN: warm and dry. No rash.  NEURO: AAOx3.  Normal gait.  Fluent speech.  PSYCH: mentation appears normal.     Results:  PFTs: Moderate restriction with significantly impaired diffusion. No positive bronchodilator response  Most Recent [facility] Pulmonary Function Testing    FVC-Pred   Date Value Ref Range Status   01/18/2022 3.82 L      FVC-Pre   Date Value Ref Range Status   01/18/2022 1.82 L      FVC-%Pred-Pre   Date Value Ref Range Status   01/18/2022 47 %      FEV1-Pre   Date Value Ref Range Status   01/18/2022 1.71 L      FEV1-%Pred-Pre   Date Value Ref Range Status   01/18/2022 58 %      FEV1FVC-Pred   Date Value Ref Range Status   01/18/2022 77 %      FEV1FVC-Pre   Date Value Ref Range Status   01/18/2022 94 %      No results found for: 20029  FEFMax-Pred   Date Value Ref Range Status   01/18/2022 7.70 L/sec      FEFMax-Pre   Date Value Ref Range Status   01/18/2022 6.04 L/sec      FEFMax-%Pred-Pre   Date Value Ref Range Status   01/18/2022 78 %      ExpTime-Pre   Date Value Ref Range Status   01/18/2022 5.57 sec      FIFMax-Pre   Date Value Ref Range Status   01/18/2022 2.07 L/sec      FEV1FEV6-Pred   Date Value Ref Range Status   01/18/2022 78 %      FEV1FEV6-Pre   Date Value Ref Range Status   01/18/2022 94 %      No results found for: 20055  Imaging (personally reviewed in clinic today):  CT Chest 04/02/2022                                                            IMPRESSION:  1.  No pulmonary embolism demonstrated.  2.  Marked interval increase in groundglass infiltrates bilaterally in the setting of pulmonary fibrosis.    CT CHEST WITHOUT IV CONTRAST- 07/15/2022  FINDINGS: Diffuse bilateral patchy reticular and fibrotic groundglass opacities  involving the central and peripheral lungs with associated traction  bronchiectasis, but no definite honeycombing. Scattered mild air trapping on  expiratory imaging.  Findings have not changed significantly since 02/09/2021.  Tiny calcified granuloma left lower lobe. Stable PO changes of right lung wedge resections.   Stable prominent mediastinal and bilateral axillary nodes are likely reactive.   Enlargement of the central pulmonary arteries suggests pulmonary arterial hypertension. Prominent coronary artery calcification. Mild cardiomegaly.     Internal fixation across an old right clavicle fracture. Mild kyphosis. Mild hypertrophic changes in the spine. Old right rib fractures.    Assessment and Plan:   Interstitial Lung Disease (Chronic hypersensitivity pneumonitis)  Case was discussed at ILD conference at [facility] and recommendations were antifirotics vs immunosuppressants. I thought the former was more appropriate as review of his ct chest showed mostly fibrotic changes. Nevertheless, Sean declines treatments with medications including Nintedanib even after counseling and discussion of the side effects. He understands that his lung disease may be progressive and may lead to worsening respiratory failure. He clearly had some set backs with admission for COVID 19 pneumonia and what appeared to be ILD flare in 2/2022 and 4/2022. He has made some improvement on steroid taper which he has almost completed. O2 requirements have been weaned from 4 LPM continuous to 2 LPM with exertion only.  His recent cardiac work up including LHC/RHC ruled out pulmonary hypertension or elevated LVEDP. I advised him to continue to monitor his O2 saturations particularly during exertion.  We also discussed antigen avoidance as it relates to his current occupation with carpets. Sean states that he has a few more years to go before he retires, but he currently wears the necessary protective wearing such as respirators when he is on the job. He has had 3 different courses of steroid tapers and he is currently at 10 mg daily. He can stop after 7 days on this dose.  He remains very active at baseline  which I encouraged him to continue to do. He doesn't want pulmonary rehab and he is up to date on his vaccinations.  Chronic Hypoxic respiratory failure  Continue O2 at 2 LPM during exertion. No evidence of cor pulmonale on cardiac work up.     Questions and concerns were answered to the patient's satisfaction.  he was provided with my contact information should new questions or concerns arise in the interim.  He should return to clinic in 3 months with PFTs and 6 min walk test  Up to date on vaccinations  I spent a total of 40 minutes face to face with Josr Jamison during today's office visit. Over 50% of this time was spent counseling the patient and/or coordinating care regarding their pulmonary disease.      Wei Hammonds MD  Pulmonary, Critical Care and Sleep Medicine  [facility]  Pager: [phone]          The above note was dictated using voice recognition software and may include typographical errors. Please contact the author for any clarifications.

## 2022-06-09 NOTE — ED PROVIDER NOTES
Anjali MITALI Young   1957   [id number]       Reason for Visit/Chief Complaint:    No chief complaint on file.    No diagnosis found.    Cancer Staging Summary for Anjali Vidal     Malignant neoplasm of head of pancreas (CMS/HCC)     Stage Date Classification Stage Status    2/21/22 Clinical Stage III (cT4, cN0, cM0) Signed by Nigel Kohler MD on 3/9/22                 Assessment & Plan     Problem   Malignant Neoplasm of Head of Pancreas (Cms/Hcc)     Presented to Dr Martin on 11/17/21 for possible clearance for cataract surgery. At the time, noted abd bloating, emesis for 1 week and flank pain for a few weeks. Urinalysis showed brown urine with moderate bilirubin. CMP drawn showed Bilirubin 4.9, AST 97, ALT 84, Alkaline Phosphatase 740, and albumin 2.9. CT done the same day of the abdomen and pelvis showed cholelithiasis and mild gallbladder distention. US liver done 11/20 showed extensive cholelithiasis and gallbladder distention, extrahepatic common bile duct dilated up to 13 mm. MRI/MCP done 11/24 showed intra and extrahepatic biliary dilatation with common bile duct measuring up to 1.7 cm diameter, no evidence of choledocholithiasis, mild pancreatic ductal distention measuring 3 mm diameter, and  a T2 hypointense contrast enhancing 1.5 cm contour bulge within the uncinate process of the pancreas, with the possibility of pancreatic neoplasm raised.       Underwent ERCP and EUS per Dr Kenyon on 12/3/21. Cholangiogram revealed a 4cm distal CBD stricture with upstream biliary dilitation to 16mm and dilated intrahepatic duct with plastic biliary stent was placed across the stricture to decompress the biliary tree. EUS cytology obtained showed adenocarcinoma     After delays imposed by difficulty in achieveing adequate blood sugar control prior to PET CT, PET CT was obtained on 1/7/22. This demonstrated mild-moderate hypermetabolism along the length of the biliary stents, additional mild hypermetabolism    History   Chief Complaint:  Shortness of Breath     HPI   Josr Jamison is a 72 year old male with history of interstitial lung disease, hypertension, hyperlipidemia who presents with shortness of breath. The patient reports onset of progressively increasing shortness of breath 6 weeks ago and rapid increase in shortness of breath over the last couple days. He is regularly on 2 L oxygen via nasal cannula at home as needed but has been dropping O2 saturation down to 60-70% with exertion so he has had to increase the flow. He is currently on 5 L oxygen. He reports exacerbated shortness of breath when laying flat. He has been using his at home Nebulizers twice daily which hasn't helped. He notes first onset of worsened shortness of breath was 6 weeks ago when he had Covid-19 but he has been on 2 L nasal cannula for much longer. He sees pulmonology at [place]. He was recently on a course of 9 days of Prednisone with the last dose about 2 weeks ago. He also mentions unexpected weight loss recently and diarrhea that began yesterday. He has a productive cough. with Denies leg swelling, vomiting, fever, sore throat or chest pain. He denies anticoagulation. He denies history of heart failure or myocardial infarction.     Review of Systems   Respiratory: Positive for shortness of breath.    All other systems reviewed and are negative.    Allergies:  Ether    Medications:  Prednisone  Decadron  Ativan  Ofev  Methylphenidate  Albuterol nebulizer    Past Medical History:     Attention deficit disorder  Hyperlipidemia  Hypertension   Lung disease  Hepatitis    Past Surgical History:    CV right heart catheterization  Lasik   ORIF clavicle  [place] teeth removal    Family History:    Mother- brain cancer  Brother- coronary artery disease, hypertension   Sister- arthritis  Father- coronary artery disease     Social History:  Presents with family      Physical Exam     Patient Vitals for the past 24 hrs:   BP Temp Temp  "src Pulse Resp SpO2 Height Weight   04/02/22 1639 -- -- -- -- -- 93 % -- --   04/02/22 1636 -- -- -- -- -- (!) 80 % -- --   04/02/22 1630 113/62 -- -- 82 8 94 % -- --   04/02/22 1615 123/71 -- -- 76 15 93 % -- --   04/02/22 1600 98/73 -- -- 76 (!) 31 92 % -- --   04/02/22 1545 117/84 -- -- 84 26 93 % -- --   04/02/22 1525 124/72 -- -- 84 24 94 % -- --   04/02/22 1445 110/71 -- -- 77 12 -- -- --   04/02/22 1430 118/75 -- -- 86 9 95 % -- --   04/02/22 1425 -- -- -- 76 21 94 % -- --   04/02/22 1415 114/68 -- -- 79 14 94 % -- --   04/02/22 1410 -- -- -- 81 18 94 % -- --   04/02/22 1401 -- -- -- 78 19 97 % -- --   04/02/22 1400 120/74 -- -- 79 -- -- -- --   04/02/22 1345 127/77 -- -- 87 -- 96 % -- --   04/02/22 1330 116/75 -- -- 86 -- 92 % -- --   04/02/22 1318 (!) 129/92 -- -- 88 -- 95 % -- --   04/02/22 1317 124/83 -- -- 84 -- -- -- --   04/02/22 1249 117/66 98.6  F (37  C) Temporal 93 24 94 % 1.727 m (5' 8\") 61.2 kg (135 lb)     Physical Exam  General: Alert, appears well-developed and well-nourished. Cooperative.     In mild distress, tachypneic.  On 2.5LMP O2 by NC.   HEENT:  Head:  Atraumatic  Ears:  External ears are normal  Mouth/Throat:  Oropharynx is without erythema or exudate and mucous membranes are moist.   Eyes:   Conjunctivae normal and EOM are normal. No scleral icterus.  CV:  Normal rate, regular rhythm, normal heart sounds and radial pulses are 2+ and symmetric.  No murmur.  Resp:  Breath sounds with diffuse rales, no expiratory or inspiratory wheezing.      Mild laboring with 2.5LPM O2 by NC.  No retractions or accessory muscle use  GI:  Abdomen is soft, no distension, no tenderness. No rebound or guarding.  No CVA tenderness bilaterally  MS:  Normal range of motion. No edema.    Back atraumatic.    No midline cervical, thoracic, or lumbar tenderness  Skin:  Warm and dry.  No rash or lesions noted.  Neuro: Alert. Normal strength.  GCS: 15  Psych:  Normal mood and affect.    Emergency Department Course " surrounding the stent in the region of the pancreatic head/uncinate process.  No PET evidence for additional hypermetabolic metastases.     Taken to the operating room on 2/21/22 by Dr Magana. At that time, found to have lesion involving the pancreatic head with the proximal part of the neck, spreading towards the hepatoduodenal ligament, extending from the pancreatic head to the body, with distal body and tail appearing atrophic with hardened consistency,  Tumor surrounding the superior mesenteric vessels and spreading within the root of mesentery below the inferior border of the pancreas, infiltrating the right gastroepiploic and right colic vessels.Given wide local involvement by the pancreatic cancer with the infiltration of the major vessels, determined that there was no possibility to achieve a radical surgery with R0 resection, procedure ended with plan to refer for neoadjuvant therapy    Discussed neoadjuvant therapy. Given baseline fraility with PS of 2, I do not beleve she would tolerate FOLFIRINOX. Recommend neoadjuvant therapy with gemcitabine 1000 mg/m2 and abraxane 125 mg/m2, given days 1, 8, and 15 of a 28 day day cycle.     Would plan on initial therapy for 3 cycles, followed by response assessment, with consideration of surgery or additional cycles depending upon response. Would plan a maximum of 6 cycles. If still unresectable after 24 weeks of therapy, and no evidence of metastases, could consider radiation with concurrent capecitabine.      Initiated Abraxane and gemcitabine on 3/17/22. MRI abdomen was obtained on 3/18 to assess disease baseline, which showed subtle ill-defined 2 cm hypoenhancing mass in pancreatic head surrounding common bile duct stent with similar diffuse atrophy and ductal dilatation of pancreatic body and tail. There is also slight increase in peripancreatic retroperitoneal adenopathy. Redemonstrated was right renal atrophy and scarring with interval development of mild    ECG  ECG obtained at 1359, ECG read at 1403  Sinus rhythm with premature atrial complexes. Left ventricular hypertrophy with repolarization abnormality.    No significant change as compared to prior, dated 2/7/22.  Rate 80 bpm. NJ interval 128 ms. QRS duration 90 ms. QT/QTc 404/465 ms. P-R-T axes 28 16 -71.     Imaging:  CT Chest Pulmonary Embolism w Contrast   Final Result   IMPRESSION:   1.  No pulmonary embolism demonstrated.   2.  Marked interval increase in groundglass infiltrates bilaterally in the setting of pulmonary fibrosis.        Report per radiology    Laboratory:  Labs Ordered and Resulted from Time of ED Arrival to Time of ED Departure   CBC WITH PLATELETS - Abnormal       Result Value    WBC Count 13.8 (*)     RBC Count 4.07 (*)     Hemoglobin 12.1 (*)     Hematocrit 37.9 (*)     MCV 93      MCH 29.7      MCHC 31.9      RDW 13.4      Platelet Count 515 (*)    BASIC METABOLIC PANEL - Abnormal    Sodium 138      Potassium 3.3 (*)     Chloride 98      Carbon Dioxide (CO2) 32      Anion Gap 8      Urea Nitrogen 14      Creatinine 0.66      Calcium 9.7      Glucose 134 (*)     GFR Estimate >90     D DIMER QUANTITATIVE - Abnormal    D-Dimer Quantitative 0.62 (*)    TROPONIN I - Abnormal    Troponin I High Sensitivity 1,413 (*)    NT PROBNP INPATIENT - Abnormal    N terminal Pro BNP Inpatient 4,369 (*)    BLOOD GAS VENOUS - Abnormal    pH Venous 7.41      pCO2 Venous 57 (*)     pO2 Venous 49 (*)     Bicarbonate Venous 36 (*)     Base Excess/Deficit (+/-) 9.4 (*)     FIO2 3     TSH WITH FREE T4 REFLEX - Normal    TSH 2.86     LACTIC ACID WHOLE BLOOD - Normal    Lactic Acid 0.7     COVID-19 VIRUS (CORONAVIRUS) BY PCR - Normal    SARS CoV2 PCR Negative        Emergency Department Course:    Reviewed:  I reviewed nursing notes, vitals, past medical history and Care Everywhere    Assessments:  1340 I obtained history and examined the patient as noted above.   1525 I rechecked the patient and explained  hydronephrosis       Pancreatic cancer. Continues on gemcitabine and Abraxane. Treatment held on 4/29 and 5/19 given issues with blood sugar management and transaminase / alkaline phosphatase elevation.     Repeat MRI abdomen was obtained on 5/24 to assess treatment response, which is stable to improved with slightly less pronounced ill-defined lesion within the head of the pancreas and stable intra and extrahepatic biliary ductal dilatation as well as pancreatic ductal dilatation with atrophy of the body and tail the pancreas again noted. Reviewed findings with patient and daughter. Given favorable response obtained will continue with treatment as planned.     Will proceed with treatment today. CBC stable today and CMP favorable with improvement in LFTs as below.     LFT elevation Cause uncertain, may be related to Abraxane. CT with biliary ductal dilatation, but favorable response to treatment. LFTs improved today with *****  AST 37, ALT 48 and Alk Phos 521. Continue monitoring.     Diabetes and hyperglycemia: Blood sugars proving hard to manage on therapy. She saw Dr. Velasco on 5/02 with plans put in place for active blood glucose monitoring and diabetes management. She will continue to see Dr. Velasco for evaluation and management. She has not received steroids with antiemetic regimen. Non-fasting bood glucose of **** today.    Myelosuppression risk: Pegfilgrastim not administered on d16 as planned due to issues with insurance coverage. Despite this her ANC has remained stable so will defer consideration of Neulasta for now.       Constipation. Advised use of Miralax prn.     I will see her back in 1 week for follow up and treatment.     Subjective     Patient returns today for follow up and treatment.     Current Outpatient Medications   Medication Sig   • Sennosides (SENOKOT PO) Take by mouth as needed.   • amLODIPine (NORVASC) 2.5 MG tablet TAKE 1 TABLET BY MOUTH EVERY MORNING   • Creon 17909 units per  capsule TAKE 2 CAPSULES BY MOUTH THREE TIMES DAILY WITH MEALS   • ALPRAZolam (XANAX) 0.5 MG tablet TAKE 1 TABLET BY MOUTH EVERY TWO TO THREE TIMES DAILY AS NEEDED FOR PANIC ATTACKS   • blood glucose (Accu-Chek Guide) test strip Test blood sugar 4 times daily. Diagnosis: Diabetes Mellitus. Meter: Accu-chek Guide   • Lantus SoloStar 100 UNIT/ML pen-injector Inject 28 Units into the skin nightly. Prime 2 units before each dose.   • desvenlafaxine (PRISTIQ) 100 MG 24 hr tablet Take 100 mg by mouth daily.   • famciclovir (FAMVIR) 250 MG tablet Take 1 tablet by mouth daily.   • levothyroxine 50 MCG tablet Take 1 tablet by mouth daily. *PLEASE COMPLETE LAB ORDERS*   • traZODone (DESYREL) 50 MG tablet Take 1 tablet by mouth nightly.   • prochlorperazine (COMPAZINE) 10 MG tablet Take 1 tablet by mouth every 6 hours as needed for Nausea or Vomiting.   • blood glucose test strip Test blood sugar 4 times daily. Diagnosis: diabetes mellitus with hyperglycemia. Meter: Accu-check Guide   • SOFTCLIX LANCETS Misc Check blood sugars 4 times daily. Diagnosis: Diabetes Mellitus with hyperglycemia. Meter: Accu-chek Guide   • HYDROmorphone (DILAUDID) 2 MG tablet Take 2 mg by mouth daily as needed (breakthrough pain).   • dronabinol (MARINOL) 2.5 MG capsule Take 2.5 mg by mouth 2 times daily as needed (pain/shaking).    • empagliflozin (Jardiance) 10 MG tablet Take 1 tablet by mouth daily. (Patient taking differently: Take 10 mg by mouth daily. Patient took medication last a month ago)   • Insulin Pen Needle (BD Pen Needle Elba U/F) 32G X 4 MM Misc Use as directed once daily   • insulin lispro (HumaLOG) 100 UNIT/ML injectable solution 0-150 = 2 units 151-200=4 units 201-250=6 units 251-300=8 units 301-350=10 units   • MAGNESIUM PO Take 250 mg by mouth nightly.    • gabapentin (NEURONTIN) 300 MG capsule Take 1 capsule by mouth 3 times daily.    • fentaNYL (DURAGESIC) 25 MCG/HR patch Place 1 patch onto the skin every 72 hours.      No  findings.     Consults:  1539 I spoke with Dr. Roth of the hospitalist service.     Interventions:  1617: Lasix 40 mg IV   1618: Aspirin 325 mg PO  1618: Heparin loading dose 3650 units IV Bolus  1619: Heparin infusion 25,000 units at 750 units/hr IV Infusion      Disposition:  The patient was admitted to the hospital under the care of Dr. Roth.     Impression & Plan     CMS Diagnoses: None    Medical Decision Making:  Josr Jamison is a 72 year old male who presents with shortness of breath.  His history and risk factor analysis are significant for HLD, HTN.  The workup in the Emergency Department (see above for cardiac enzymes and EKG) is concerning for elevated troponin and BNP elevation.  Concern that the patient's shortness of breath is due to heart failure and a potentially recent acute cardiac event.  EKG shows no concerning acute dynamic ischemic changes.  My clinical suspicion of acute coronary syndrome is high enough to warrant further therapy and investigation.  I will admit the patient to medicine service for further workup.  The patient is not having any chest pain at this time but rather just persistent shortness of breath.  I suspect he may be mildly fluid overloaded and will attempt a trial of diuresis with Lasix.  Patient remains on supplemental oxygen and heparin has been ordered.  Patient case discussed with Dr. Roth who agreed to cardiac telemetry admission.     There is no clinical, laboratory, or radiographic evidence of pulmonary embolism, AAA, aortic dissection, pneumonia or pneumothorax.     He agrees to be admitted and all questions were answered.     Critical Care time was 30 minutes for this patient excluding procedures.    Diagnosis:    ICD-10-CM    1. NSTEMI (non-ST elevated myocardial infarction) (H)  I21.4    2. Acute respiratory failure with hypoxia (H)  J96.01    3. ILD (interstitial lung disease) (H)  J84.9    4. Shortness of breath  R06.02    5. Acute congestive heart  current facility-administered medications for this visit.       Objective     Last Recorded Vitals  Oncology Encounter Vitals   ONC OP Encounter Vitals Group      BP       Pulse       Resp       Temp       Temp src       SpO2       Weight       Height       Pain Score       Pain Location       Pain Education?       BSA (Calculated - m2) - [employer]       BSA (Calculated - sq m)       BMI (Calculated)      ECOG   ECOG Performance Status        Physical Exam    Vital signs noted. Slight build and walk with cane  Gen: The patient is in no acute distress.   Eyes: EOMI, anicteric  Nose/throat. No oropharyngeal lesions noted. Moist  Neck supple. No cervical or supraclavicular lymph nodes palpated.   Lungs clear to auscultation bilaterally. No wheezes  Cardiac examination reveals regular rate and rhythm.   Abdomen is soft nontender nondistended with no palpable masses or organomegaly.   No lower extremity edema   Neurological examination nonfocal.   Exposed skin without visible lesions.   Alert and oriented with appropriate mood and disposition  Lymph nodes: No cervical, supraclavicular, axillary or inguinal PAM      Labs       Hospital Outpatient Visit on 06/02/2022   Component Date Value Ref Range Status   • Sodium 06/02/2022 135  135 - 145 mmol/L Final   • Potassium 06/02/2022 4.3  3.4 - 5.1 mmol/L Final   • Chloride 06/02/2022 103  97 - 110 mmol/L Final   • Carbon Dioxide 06/02/2022 28  21 - 32 mmol/L Final   • Anion Gap 06/02/2022 8  7 - 19 mmol/L Final   • Glucose 06/02/2022 215 (A) 70 - 99 mg/dL Final   • BUN 06/02/2022 12  6 - 20 mg/dL Final   • Creatinine 06/02/2022 0.55  0.51 - 0.95 mg/dL Final   • Glomerular Filtration Rate 06/02/2022 >90  >=60 Final    eGFR results = or >60 mL/min/1.73m2 = Normal kidney function. Estimated GFR calculated using the CKD-EPI-R (2021) equation that does not include race in the creatinine calculation.   • BUN/ Creatinine Ratio 06/02/2022 22  7 - 25 Final   • Calcium 06/02/2022  failure, unspecified heart failure type (H)  I50.9        Scribe Disclosure:  I, Cathleen Jonas, am serving as a scribe at 1:30 PM on 4/2/2022 to document services personally performed by Bradley Powers MD based on my observations and the provider's statements to me.            Bradley Powers MD  04/02/22 8039     8.8  8.4 - 10.2 mg/dL Final   • Bilirubin, Total 06/02/2022 0.3  0.2 - 1.0 mg/dL Final   • GOT/AST 06/02/2022 37  <=37 Units/L Final   • GPT/ALT 06/02/2022 48  <64 Units/L Final   • Alkaline Phosphatase 06/02/2022 521 (A) 45 - 117 Units/L Final   • Albumin 06/02/2022 3.1 (A) 3.6 - 5.1 g/dL Final   • Protein, Total 06/02/2022 7.2  6.4 - 8.2 g/dL Final   • Globulin 06/02/2022 4.1 (A) 2.0 - 4.0 g/dL Final   • A/G Ratio 06/02/2022 0.8 (A) 1.0 - 2.4 Final   • WBC 06/02/2022 4.1 (A) 4.2 - 11.0 K/mcL Final   • RBC 06/02/2022 3.63 (A) 4.00 - 5.20 mil/mcL Final   • HGB 06/02/2022 10.4 (A) 12.0 - 15.5 g/dL Final   • HCT 06/02/2022 32.9 (A) 36.0 - 46.5 % Final   • MCV 06/02/2022 90.6  78.0 - 100.0 fl Final   • MCH 06/02/2022 28.7  26.0 - 34.0 pg Final   • MCHC 06/02/2022 31.6 (A) 32.0 - 36.5 g/dL Final   • RDW-CV 06/02/2022 16.5 (A) 11.0 - 15.0 % Final   • RDW-SD 06/02/2022 54.2 (A) 39.0 - 50.0 fL Final   • PLT 06/02/2022 257  140 - 450 K/mcL Final   • NRBC 06/02/2022 0  <=0 /100 WBC Final   • Neutrophil, Percent 06/02/2022 38  % Final   • Lymphocytes, Percent 06/02/2022 54  % Final   • Mono, Percent 06/02/2022 0  % Final   • Eosinophils, Percent 06/02/2022 5  % Final   • Basophils, Percent 06/02/2022 1  % Final   • Abnormal Lymphocytes, Percent 06/02/2022 2 (A) <=0 % Final   • Absolute Neutrophil 06/02/2022 1.6 (A) 1.8 - 7.7 K/mcL Final   • Absolute Lymphocytes 06/02/2022 2.3  1.0 - 4.0 K/mcL Final   • Absolute Monocytes 06/02/2022 0.0 (A) 0.3 - 0.9 K/mcL Final   • Absolute Eosinophils 06/02/2022 0.2  0.0 - 0.5 K/mcL Final   • Absolute Basophils 06/02/2022 0.0  0.0 - 0.3 K/mcL Final   • RBC Morphology 06/02/2022 Normal  Normal Final   • Platelet Morphology 06/02/2022 Normal  Normal Final      GOT/AST (Units/L)   Date Value   06/02/2022 37     GPT/ALT (Units/L)   Date Value   06/02/2022 48     No results found for: GGTP  Alkaline Phosphatase (Units/L)   Date Value   06/02/2022 521 (H)     Bilirubin, Total (mg/dL)   Date Value    06/02/2022 0.3     Sodium (mmol/L)   Date Value   06/02/2022 135     Potassium (mmol/L)   Date Value   06/02/2022 4.3     Chloride (mmol/L)   Date Value   06/02/2022 103     Carbon Dioxide (mmol/L)   Date Value   06/02/2022 28     BUN (mg/dL)   Date Value   06/02/2022 12     Creatinine (mg/dL)   Date Value   06/02/2022 0.55     Glucose (mg/dL)   Date Value   06/02/2022 215 (H)         Imaging    EXAM: MRI ABDOMEN W WO CONTRAST: 5/24/2022    CLINICAL INDICATION: Pancreatic cancer restaging.     COMPARISON: MRI of the abdomen with and without gadolinium 03/18/2022.     Technique: Multisequential multiplanar imaging was obtained through the   abdomen with and without the administration of gadolinium.     FINDINGS: Note is made of a stable low signal intensity focus in the   superior right lateral aspect of the T12 vertebral body unchanged dating   back to the MRI the lumbar spine 11/24/2020.     The visualized portions of the liver, spleen, adrenal glands, and kidneys   bilaterally are unremarkable except for a lobulated contour and areas of   cortical thinning within the right kidney most consistent with cortical   scarring.     Distention of the gallbladder is noted containing sludge and gallstones.   Stable dilatation of the common bile duct is noted measuring up to 11.5 mm   with abrupt changing caliber in the region of the pancreatic head with   associated extensive dilatation of the pancreatic duct again noted as well   as atrophy of the body and tail of the pancreas.  This is associated with a   subtle 1.5 cm isoechoic mass within the head of the pancreas which may be   slightly less prominent than on the examination of 03/18/2022.  No new or   evolving adenopathy is identified.  Mild intrahepatic biliary ductal   dilatation is noted.    Impression:     1.  Stable to slightly less pronounced ill-defined lesion within the head   of the pancreas with stable intra and extrahepatic biliary ductal   dilatation as  well as pancreatic ductal dilatation with atrophy of the body   and tail the pancreas again noted.   2.  Dilatation of the gallbladder containing sludge and stones with a   suggestion of a small amount of debris within the common bile duct.   3.  Stable adenopathy.     Electronically Signed by: JOSE A MERRITT M.D.   Signed on: 5/24/2022 10:01 AM        EXAM: MRI ABDOMEN W WO CONTRAST: 3/18/22     CLINICAL INDICATION: Malignant neoplasm head of pancreas     COMPARISON:  11/24/2021     TECHNIQUE: Multiplanar multi sequential MRI imaging was performed through  the abdomen before and following administration of 6 mL intravenous  MultiHance in standard dynamic imaging protocol.     FINDINGS: Redemonstrated is diffuse intrahepatic and moderate upper  extrahepatic biliary ductal dilatation, slightly improved from the  11/24/2021 MRI.  Common bile duct stent appears in expected position.   Additional stent within the pancreatic head extending into the duodenum was  better seen on CT scan.  The pancreas shows similar diffuse atrophy of the  tail and body parenchyma with associated ductal dilatation up to 4 mm,  slightly worsened from previous exam.  A few tiny cystic foci versus  dilated side branch ducts are noted.  The pancreatic head/uncinate process  shows ill-defined hypoenhancing regions surrounding the common bile duct  stent.  There is measures up to 14 x 20 mm and appears more conspicuous and  larger in comparison to the 11/24/2021 exam.  This also corresponds with  abnormal uptake on PET scan surrounding the biliary stent.     No focal new hepatic lesions are identified.  The spleen, adrenal glands  and left kidney remain unremarkable.  Right kidney shows stable atrophy and  multifocal chronic scarring with slight hydronephrosis, increased from  prior.  The gallbladder shows layering calculi and sludge without definite  wall edema or inflammatory changes.  No small or large bowel dilatation  identified.  There are  degenerative changes of the spine with multilevel  central and neural foraminal stenosis noted.  There is minimal scoliosis.   There are enlarged lymph nodes in the portal caval region, slightly larger,  measuring up to 10 mm short axis.     IMPRESSION:  1.   Subtle ill-defined 2 cm hypoenhancing mass in pancreatic head  surrounding common bile duct stent.  Similar diffuse atrophy and ductal  dilatation of pancreatic body and tail.  2.   CBD drain appears normally located with slight improved intrahepatic  and extrahepatic biliary ductal dilatation since 11/24/2021 exam.  3.   Slight increase in peripancreatic retroperitoneal adenopathy.  4.   Uncomplicated cholelithiasis.  5.   Redemonstrated right renal atrophy and scarring with interval  development of mild hydronephrosis.       Electronically Signed by: BLAIR FERNANDEZ M.D.   Signed on: 3/18/2022 4:01 PM   Narrative & Impression   EXAM: PET CT FDG SKULL BASE TO MID THIGH INITIAL          CLINICAL INDICATION: Pancreatic cancer, staging     COMPARISON: 11/17/2021 noncontrast CT abdomen pelvis     PROCEDURE:   Blood glucose at time of exam: 83 mg/dL.   Radiopharmaceutical dose: 11.1 mCi fluorine 18 FDG administered  intravenously left antecubital IV site.  FDG uptake scan time: 63 minutes.     Post injection PET CT is obtained from the proximal thigh to above the  skull base. PET scan is fused to the transmission low dose CT scan acquired  for attenuation correction and anatomic localization.     The study and comparison studies reviewed on the [employer]  workstation.     FINDINGS:     NECK:   No concerning focal FDG avidity noted to indicate hypermetabolic  metastases.        Transmission CT images are unremarkable for pathologically enlarged  lymphadenopathy.     CHEST:   No concerning focal FDG avidity is appreciated.        Transmission CT images are unremarkable for pathologic enlarged  lymphadenopathy.  Calcified mediastinal and hilar lymph nodes  are noted.  A  few coronary arterial calcifications are seen.       There are patchy areas of micronodular interstitial opacities bilaterally,  some with tree-in-bud appearance.  No associated prominent FDG avidity is  seen.  Query unspecified inflammatory/infectious.  Surveillance advised.          ABDOMEN PELVIS:   Physiologic gastrointestinal and genitourinary system activity can limit  the detection of pathology.       Nonspecific mild-moderate hypermetabolism is seen along the lengths of the  biliary stents.  There is mild hypermetabolism surrounding the stent, in  the region of the pancreatic head and uncinate process in this patient with  recently diagnosed pancreatic head cancer.        No additional concerning focal FDG avidity appreciated to suggest  hypermetabolic metastases.     Transmission CT images are unremarkable for pathologically enlarged  lymphadenopathy.  Pneumobilia noted.  Small gallstones noted in the  gallbladder.  Right renal atrophy     OSSEOUS:   No concerning focal hypermetabolism seen to suggest hypermetabolic bone  metastasis.     Reference SUV  Mediastinum Max/Mean: 1.74  Liver Max/Mean: 2.36     IMPRESSION:  1. Mild-moderate hypermetabolism along the length of the biliary stents.   Additional mild hypermetabolism surrounding the stent in the region the  pancreatic head/uncinate process.  No PET evidence for additional  hypermetabolic metastases.  2.  Additional findings as above.         Recent PHQ 2/9 Score    PHQ 2:  Date Adult PHQ 2 Score Adult PHQ 2 Interpretation   6/2/2022 4 Further screening needed       PHQ 9:  Date Adult PHQ 9 Score Adult PHQ 9 Interpretation   5/19/2022 20 Severe Depression       Distress Score   [facility]      Distress Scale (0-10)          Prognosis: guarded  Treatment intent: curative    On 6/09/2022, Sunday [facility] scribed the services personally performed by Dr. Kohler.

## 2022-07-11 NOTE — TELEPHONE ENCOUNTER
I have not seen patient since 2016.  Looks like Dr. Torres has seen him most recently and will place forms on his desk.

## 2022-07-11 NOTE — TELEPHONE ENCOUNTER
Form received from [facility] of Medical Necessity.  Placed on Dr. Kebede's desk for signature.

## 2022-09-06 NOTE — PROGRESS NOTES
"  SUBJECTIVE:   Josr Jamison is a 67 year old male who presents to clinic today for the following health issues:      Acute Illness   Acute illness concerns: Cold  Onset: 6 weeks    Fever: no     Chills/Sweats: YES- sometimes    Headache (location?): no    Sinus Pressure:no    Conjunctivitis:  no    Ear Pain: no    Rhinorrhea: YES    Congestion: YES    Sore Throat: no      Cough: YES-productive of green sputum    Wheeze: YES    Decreased Appetite: no     Nausea: no     Vomiting: no     Diarrhea:  no     Dysuria/Freq.: no     Fatigue/Achiness: no     Sick/Strep Exposure: YES     Therapies Tried and outcome: Dayquil and Nyquil- little relief      No shortness of breath.  Sleeps ok with nyquil.  Wakes up in AM with \"spitting and hacking\" green sputum.   Wife treated with omnicef and she has had dramatic improvement in symptoms     Wonders about shingles vaccine  Complains that he cannot tolerate needles even for blood draw and requests prescription for emla cream prior to any blood draw or immunization    Problem list and histories reviewed & adjusted, as indicated.  Additional history: as documented    Patient Active Problem List   Diagnosis     Advanced directives, counseling/discussion     ADD (attention deficit disorder)     Right inguinal hernia     CARDIOVASCULAR SCREENING; LDL GOAL LESS THAN 130     Hypertension goal BP (blood pressure) < 140/90     History of hepatitis     Glaucoma suspect     Cataract     Past Surgical History:   Procedure Laterality Date     CATARACT IOL, RT/LT      bilateral     COLONOSCOPY  4/25/2013     HERNIORRHAPHY INGUINAL Right 4/7/2016    Procedure: HERNIORRHAPHY INGUINAL;  Surgeon: Andriy Sheehan MD;  Location:  OR     LAPAROSCOPIC HERNIORRHAPHY INGUINAL  5/9/2013    Procedure: LAPAROSCOPIC HERNIORRHAPHY INGUINAL;  Laparoscopic inguinal hernia repair;  Surgeon: Andriy Sheehan MD;  Location:  OR     LASIK  2007     MOUTH SURGERY  In his 20's    [place] [facility] and " "tooth pulled     OPEN REDUCTION INTERNAL FIXATION CLAVICLE  6/20/2014    Procedure: OPEN REDUCTION INTERNAL FIXATION CLAVICLE;  Surgeon: Manjinder Landon MD;  Location:  OR       Social History   Substance Use Topics     Smoking status: Former Smoker     Packs/day: 1.50     Years: 20.00     Quit date: 1/1/1995     Smokeless tobacco: Never Used     Alcohol use Yes      Comment: couple times per week - 2-3     Family History   Problem Relation Age of Onset     CANCER Mother      Brain     DIABETES Maternal Grandfather      C.A.D. Maternal Grandfather      Hypertension Maternal Grandfather      C.A.D. Brother 57     CABG, 3 vessel with valve replacement     Hypertension Brother      Allergies Brother      seasonal     Arthritis Sister      C.A.D. Father      cabg, stents.      DIABETES Other      Uncle - fathers side of family     Asthma No family hx of      CEREBROVASCULAR DISEASE No family hx of      Breast Cancer No family hx of      Cancer - colorectal No family hx of      Prostate Cancer No family hx of      Alcohol/Drug No family hx of      Alzheimer Disease No family hx of      Anesthesia Reaction No family hx of      Blood Disease No family hx of          Current Outpatient Prescriptions   Medication Sig Dispense Refill                          Methylphenidate HCl (CONCERTA PO)        IBUPROFEN PO        Multiple Vitamins-Minerals (MULTIVITAMIN & MINERAL PO) Take  by mouth.         Reviewed and updated as needed this visit by clinical staff  Tobacco  Allergies  Meds  Problems  Med Hx  Surg Hx  Fam Hx  Soc Hx        Reviewed and updated as needed this visit by Provider  Tobacco  Allergies  Meds  Problems  Med Hx  Surg Hx  Soc Hx        ROS:  Constitutional, HEENT, cardiovascular, pulmonary, gi and gu systems are negative, except as otherwise noted.    OBJECTIVE:     BP (!) 128/96  Pulse 83  Temp 98.7  F (37.1  C) (Oral)  Resp 20  Ht 1.715 m (5' 7.52\")  Wt 74.4 kg (164 lb)  SpO2 97%  BMI " 25.29 kg/m2  Body mass index is 25.29 kg/(m^2).  GENERAL: healthy, alert and no distress  EYES: Eyes grossly normal to inspection, PERRL and conjunctivae and sclerae normal  HENT: ear canals and TM's normal, nose and mouth without ulcers or lesions  NECK: no adenopathy, no asymmetry, masses, or scars and thyroid normal to palpation  RESP: lungs clear to auscultation - no rales, rhonchi or wheezes  CV: regular rate and rhythm, normal S1 S2, no S3 or S4, no murmur, click or rub, no peripheral edema and peripheral pulses strong  MS: no gross musculoskeletal defects noted, no edema  PSYCH: mentation appears normal, affect normal/bright    Diagnostic Test Results:  Results for orders placed or performed in visit on 02/09/18   Influenza A/B antigen   Result Value Ref Range    Influenza A/B Agn Specimen Nasal     Influenza A Negative NEG^Negative    Influenza B Negative NEG^Negative       ASSESSMENT/PLAN:             1. Acute bronchitis, unspecified organism  Normal sounding lungs. Will treat with omnicef and burst of prednisone and follow up with us if symptoms not improving and get chest xray at that time.   - cefdinir (OMNICEF) 300 MG capsule; Take 1 capsule (300 mg) by mouth 2 times daily for 10 days  Dispense: 20 capsule; Refill: 0  - predniSONE (DELTASONE) 20 MG tablet; Take 1 tablet (20 mg) by mouth 2 times daily  Dispense: 10 tablet; Refill: 0    2. Hypertension goal BP (blood pressure) < 140/90  Patient declines treatment today- reviewed risks with uncontrolled hypertension.  Advised to consider PGEN study.  Given brochure.     3. Cough    - Influenza A/B antigen    4. Needle phobia    - lidocaine-prilocaine (EMLA) cream; Apply topically as needed for moderate pain  Dispense: 5 g; Refill: 3    Patient Instructions   Apply EMLA cream to affected area 15-30 minutes before needle stick.    Take omnicef twice a day for 10 days  Take prednisone twice a day for 5 days  Please schedule physical with fasting labs (nothing  to eat or drink except water and/or medications 10 hours prior to appointment) and follow up  for treatment of hypertension or consider hypertension study for blood pressure management.           .   25 min spent in direct face to face time with this pt, greater than 50% in counseling and coordination of care.       BRAYAN DownsC  [facility]   170.18

## 2023-01-09 ENCOUNTER — TELEPHONE (OUTPATIENT)
Dept: FAMILY MEDICINE | Facility: CLINIC | Age: 73
End: 2023-01-09

## 2023-01-09 NOTE — TELEPHONE ENCOUNTER
Dear Elizabeth Kebede M.D.    You have been assigned to provide Cause of Death information for Josr Jamison, who  on 2022. Log into [place] [employer] (MR&C) and access your work queue to file Cause of Death.

## 2023-01-09 NOTE — TELEPHONE ENCOUNTER
Please call and advise that This needs to be completed by Dr. patel - he was the last one to see the patient - please change-

## 2023-01-09 NOTE — TELEPHONE ENCOUNTER
Pricila,  at [facility] [employer], calling to request provider to complete cremation authorization form online for the  patient ASAP.     Pricila states that he has reached out to the provider multiple times to request forms to be completed with Sandie Velarde, and was told that provider could get this completed on 12/3/2022 but has not had a response yet. Pricila requesting form to get completed. If there needs to be a change in provider who can complete the form, please call Pricila back so that they can switch the provider.    RN notes that provider is out of office today and will be back in tomorrow. Pricila states understanding and will wait for form to be completed.    Routing to provider to review and advise.    Cierra Capone RN  [facility]

## 2023-01-09 NOTE — TELEPHONE ENCOUNTER
Call made to [facility] Medical Examiner and received the following information from them:    Patient  following witnessed collapse at home.  History of interstitial lung disease, hypertension, NSTEMI, ADD, CHF.      Patient received scheduled treatment for his interstitial lung disease on 22.  Upon returning home from this treatment, patient stated he felt dizzy and needed help getting out of the car and into the house.  Wife was with patient and went to help patient into home and he collapsed and became unresponsive.  911 called, EMS initiated CPR upon arrival and was pronounced dead at the scene.    EMS reported to Medical Examiner that there was no evidence of trauma.  Patient death recorded with Medical Examiner, case was not assigned to Medical Examiner.

## 2023-07-14 NOTE — PATIENT INSTRUCTIONS
You were seen at the [facility] clinic today.  You saw Dr. Hasmukh Reyes  Here are your Instructions:    1. Start pravastatin 20 mg daily.  2. Follow up with Dr. Reyes in 1 year with fasting lab work prior.   
knee/back

## (undated) DEVICE — Device

## (undated) DEVICE — CATH DIAG 4FR JL 4.5 538417

## (undated) DEVICE — TOTE ANGIO CORP PC15AT SAN32CC83O

## (undated) DEVICE — CATH ANGIO WEDGE PRESSURE 5FRX110CM DL AI-07124

## (undated) DEVICE — DEFIB PRO-PADZ LVP LQD GEL ADULT 8900-2105-01

## (undated) DEVICE — LINE MONITOR NASAL SMART CAPNOLINE ADULT LONG 12463

## (undated) DEVICE — KIT HAND CONTROL ANGIOTOUCH ACIST 65CM AT-P65

## (undated) DEVICE — CATH ANGIO INFINITI JR4 4FRX100CM 538421

## (undated) DEVICE — INTRO SHEATH 7FRX10CM PINNACLE RSS702

## (undated) DEVICE — MANIFOLD KIT ANGIO AUTOMATED 014613

## (undated) DEVICE — INTRO SHEATH 5FRX10CM PINNACLE RSS502

## (undated) DEVICE — INTRODUCER CATH VASC 5FRX10CM  MPIS-501-NT-U-SST

## (undated) DEVICE — PACK HEART RIGHT CUSTOM SAN32RHF18

## (undated) DEVICE — INTRO SHEATH MICRO PLATINUM TIP 4FRX40CM 7274

## (undated) DEVICE — INTRO SHEATH 4FRX10CM PINNACLE RSS402

## (undated) RX ORDER — NITROGLYCERIN 5 MG/ML
VIAL (ML) INTRAVENOUS
Status: DISPENSED
Start: 2022-01-01

## (undated) RX ORDER — LIDOCAINE HYDROCHLORIDE 10 MG/ML
INJECTION, SOLUTION EPIDURAL; INFILTRATION; INTRACAUDAL; PERINEURAL
Status: DISPENSED
Start: 2022-01-01

## (undated) RX ORDER — VERAPAMIL HYDROCHLORIDE 2.5 MG/ML
INJECTION, SOLUTION INTRAVENOUS
Status: DISPENSED
Start: 2022-01-01

## (undated) RX ORDER — HEPARIN SODIUM 200 [USP'U]/100ML
INJECTION, SOLUTION INTRAVENOUS
Status: DISPENSED
Start: 2022-01-01

## (undated) RX ORDER — LIDOCAINE 40 MG/G
CREAM TOPICAL
Status: DISPENSED
Start: 2021-04-21

## (undated) RX ORDER — HEPARIN SODIUM 1000 [USP'U]/ML
INJECTION, SOLUTION INTRAVENOUS; SUBCUTANEOUS
Status: DISPENSED
Start: 2022-01-01

## (undated) RX ORDER — FENTANYL CITRATE 50 UG/ML
INJECTION, SOLUTION INTRAMUSCULAR; INTRAVENOUS
Status: DISPENSED
Start: 2022-01-01